# Patient Record
Sex: FEMALE | Race: WHITE | NOT HISPANIC OR LATINO | Employment: OTHER | URBAN - METROPOLITAN AREA
[De-identification: names, ages, dates, MRNs, and addresses within clinical notes are randomized per-mention and may not be internally consistent; named-entity substitution may affect disease eponyms.]

---

## 2017-09-05 DIAGNOSIS — Z12.31 ENCOUNTER FOR SCREENING MAMMOGRAM FOR MALIGNANT NEOPLASM OF BREAST: Primary | ICD-10-CM

## 2017-09-28 ENCOUNTER — HOSPITAL ENCOUNTER (OUTPATIENT)
Dept: RADIOLOGY | Facility: HOSPITAL | Age: 77
Discharge: HOME | End: 2017-09-28
Attending: INTERNAL MEDICINE
Payer: MEDICARE

## 2017-09-28 VITALS — WEIGHT: 142 LBS | BODY MASS INDEX: 22.82 KG/M2 | HEIGHT: 66 IN

## 2017-09-28 DIAGNOSIS — Z12.31 ENCOUNTER FOR SCREENING MAMMOGRAM FOR MALIGNANT NEOPLASM OF BREAST: ICD-10-CM

## 2017-09-28 PROCEDURE — 77067 SCR MAMMO BI INCL CAD: CPT | Mod: 26,,, | Performed by: RADIOLOGY

## 2017-09-28 PROCEDURE — 77063 BREAST TOMOSYNTHESIS BI: CPT | Mod: 26,,, | Performed by: RADIOLOGY

## 2017-09-28 PROCEDURE — 77067 SCR MAMMO BI INCL CAD: CPT | Mod: TC

## 2017-11-15 ENCOUNTER — OFFICE VISIT (OUTPATIENT)
Dept: DERMATOLOGY | Facility: CLINIC | Age: 77
End: 2017-11-15
Payer: MEDICARE

## 2017-11-15 VITALS — HEIGHT: 66 IN | BODY MASS INDEX: 22.82 KG/M2 | WEIGHT: 142 LBS

## 2017-11-15 DIAGNOSIS — D18.01 CHERRY ANGIOMA: ICD-10-CM

## 2017-11-15 DIAGNOSIS — L82.1 SEBORRHEIC KERATOSES: ICD-10-CM

## 2017-11-15 DIAGNOSIS — L81.4 SOLAR LENTIGO: ICD-10-CM

## 2017-11-15 DIAGNOSIS — L57.0 ACTINIC KERATOSES: Primary | ICD-10-CM

## 2017-11-15 PROCEDURE — 99999 PR PBB SHADOW E&M-EST. PATIENT-LVL III: CPT | Mod: PBBFAC,,, | Performed by: DERMATOLOGY

## 2017-11-15 PROCEDURE — 99213 OFFICE O/P EST LOW 20 MIN: CPT | Mod: 25,S$GLB,, | Performed by: DERMATOLOGY

## 2017-11-15 PROCEDURE — 17000 DESTRUCT PREMALG LESION: CPT | Mod: S$GLB,,, | Performed by: DERMATOLOGY

## 2017-11-15 NOTE — PATIENT INSTRUCTIONS

## 2017-11-15 NOTE — PROGRESS NOTES
Subjective:       Patient ID:  Divine Ray is a 76 y.o. female who presents for   Chief Complaint   Patient presents with    Spot     L arm     Last seen 9/2016  No h/o skin cancer  Denies h/o intense sun exposure      Spot  - Initial  Affected locations: left arm  Duration: 1 year  Signs and Symptoms: brown spots.  Severity: mild  Timing: constant  Aggravated by: nothing  Relieving factors/Treatments tried: nothing        Review of Systems   Constitutional: Negative for fever, chills, weight loss, weight gain and night sweats.   Skin: Positive for daily sunscreen use, activity-related sunscreen use and wears hat.   Hematologic/Lymphatic: Does not bruise/bleed easily.        Objective:    Physical Exam   Constitutional: She appears well-developed and well-nourished. No distress.   Neurological: She is alert and oriented to person, place, and time. She is not disoriented.   Psychiatric: She has a normal mood and affect.   Skin:   Areas Examined (abnormalities noted in diagram):   Scalp / Hair Palpated and Inspected  Head / Face Inspection Performed  Neck Inspection Performed  Chest / Axilla Inspection Performed  Abdomen Inspection Performed  Back Inspection Performed  RUE Inspected  LUE Inspection Performed                   Diagram Legend     Erythematous scaling macule/papule c/w actinic keratosis       Vascular papule c/w angioma      Pigmented verrucoid papule/plaque c/w seborrheic keratosis      Yellow umbilicated papule c/w sebaceous hyperplasia      Irregularly shaped tan macule c/w lentigo     1-2 mm smooth white papules consistent with Milia      Movable subcutaneous cyst with punctum c/w epidermal inclusion cyst      Subcutaneous movable cyst c/w pilar cyst      Firm pink to brown papule c/w dermatofibroma      Pedunculated fleshy papule(s) c/w skin tag(s)      Evenly pigmented macule c/w junctional nevus     Mildly variegated pigmented, slightly irregular-bordered macule c/w mildly atypical nevus       Flesh colored to evenly pigmented papule c/w intradermal nevus       Pink pearly papule/plaque c/w basal cell carcinoma      Erythematous hyperkeratotic cursted plaque c/w SCC      Surgical scar with no sign of skin cancer recurrence      Open and closed comedones      Inflammatory papules and pustules      Verrucoid papule consistent consistent with wart     Erythematous eczematous patches and plaques     Dystrophic onycholytic nail with subungual debris c/w onychomycosis     Umbilicated papule    Erythematous-base heme-crusted tan verrucoid plaque consistent with inflamed seborrheic keratosis     Erythematous Silvery Scaling Plaque c/w Psoriasis     See annotation      Assessment / Plan:        Actinic keratoses  Cryosurgery Procedure Note    Verbal consent from the patient is obtained and the patient is aware of the precancerous quality and need for treatment of these lesions. Liquid nitrogen cryosurgery is applied to the 1 actinic keratoses, as detailed in the physical exam, to produce a freeze injury. The patient is aware that blisters may form and is instructed on wound care with gentle cleansing and use of vaseline ointment to keep moist until healed. The patient is supplied a handout on cryosurgery and is instructed to call if lesions do not completely resolve.    Seborrheic keratoses  These are benign inherited growths without a malignant potential. Reassurance given to patient. No treatment is necessary.     Solar lentigo  This is a benign hyperpigmented sun induced lesion. Daily sun protection will reduce the number of new lesions. Treatment of these benign lesions are considered cosmetic.    Cherry angioma  This is a benign vascular lesion. Reassurance given. No treatment required.     Patient instructed in importance in daily sun protection of at least spf 30. Sun avoidance and topical protection discussed.   Recommend Elta MD (physician office) COTZ sensitive (available online) for daily use on face  and neck.  Patient encouraged to wear hat for all outdoor exposure.   Also discussed sun protective clothing.           Return in about 1 year (around 11/15/2018).

## 2018-08-30 DIAGNOSIS — Z12.39 SCREENING BREAST EXAMINATION: Primary | ICD-10-CM

## 2018-10-01 ENCOUNTER — HOSPITAL ENCOUNTER (OUTPATIENT)
Dept: RADIOLOGY | Facility: HOSPITAL | Age: 78
Discharge: HOME | End: 2018-10-01
Attending: INTERNAL MEDICINE
Payer: MEDICARE

## 2018-10-01 DIAGNOSIS — Z12.39 SCREENING BREAST EXAMINATION: ICD-10-CM

## 2018-10-01 PROCEDURE — 77067 SCR MAMMO BI INCL CAD: CPT | Mod: 26,,, | Performed by: RADIOLOGY

## 2018-10-01 PROCEDURE — 77063 BREAST TOMOSYNTHESIS BI: CPT | Mod: TC

## 2018-10-01 PROCEDURE — 77063 BREAST TOMOSYNTHESIS BI: CPT | Mod: 26,,, | Performed by: RADIOLOGY

## 2018-11-06 ENCOUNTER — OFFICE VISIT (OUTPATIENT)
Dept: DERMATOLOGY | Facility: CLINIC | Age: 78
End: 2018-11-06
Payer: MEDICARE

## 2018-11-06 DIAGNOSIS — L25.9 CONTACT DERMATITIS, UNSPECIFIED CONTACT DERMATITIS TYPE, UNSPECIFIED TRIGGER: Primary | ICD-10-CM

## 2018-11-06 DIAGNOSIS — L82.1 SEBORRHEIC KERATOSES: ICD-10-CM

## 2018-11-06 PROCEDURE — 99213 OFFICE O/P EST LOW 20 MIN: CPT | Mod: S$GLB,,, | Performed by: DERMATOLOGY

## 2018-11-06 PROCEDURE — 99999 PR PBB SHADOW E&M-EST. PATIENT-LVL III: CPT | Mod: PBBFAC,,, | Performed by: DERMATOLOGY

## 2018-11-06 PROCEDURE — 1101F PT FALLS ASSESS-DOCD LE1/YR: CPT | Mod: CPTII,S$GLB,, | Performed by: DERMATOLOGY

## 2018-11-06 RX ORDER — LOSARTAN POTASSIUM 100 MG/1
100 TABLET ORAL DAILY
Refills: 0 | COMMUNITY
Start: 2018-08-29 | End: 2021-04-05 | Stop reason: SDUPTHER

## 2018-11-06 RX ORDER — BETAMETHASONE DIPROPIONATE 0.5 MG/G
OINTMENT TOPICAL
Qty: 45 G | Refills: 0 | Status: SHIPPED | OUTPATIENT
Start: 2018-11-06 | End: 2020-05-04

## 2018-11-06 NOTE — PROGRESS NOTES
Subjective:       Patient ID:  Divine Ray is a 77 y.o. female who presents for   Chief Complaint   Patient presents with    Itching     neck    Lesion     neck     Patient last seen 11/2017  No h/o skin cancer  Denies h/o intense sun exposure    New complaint of itchy rash on B neck base x 2 weeks, no treatment tried  r side started and is now improved, left side new and angry  Occasional silver necklace no laniard  No change in PO meds  No change in skin care routine  Dove sensitive, no moisturizer  Spray perfume in area (ears), same x years  New landscaping to front yard, did not handle the plants directly      Current Outpatient Medications:     amlodipine (NORVASC) 5 MG tablet, TK 1 T PO  D, Disp: , Rfl: 1    losartan (COZAAR) 100 MG tablet, TK 1 T PO QD, Disp: , Rfl: 0        Itching  - Initial  Affected locations: neck  Duration: 2 weeks  Signs / symptoms: itching and redness  Severity: mild  Timing: constant  Aggravated by: nothing  Relieving factors/Treatments tried: nothing    Lesion  - Initial  Affected locations: neck  Duration: 2 weeks  Signs and Symptoms: raised.  Severity: mild  Timing: constant  Aggravated by: nothing  Relieving factors/Treatments tried: nothing        Review of Systems   Constitutional: Negative for fever, chills and fatigue.   Skin: Positive for itching, rash, activity-related sunscreen use and wears hat.   Hematologic/Lymphatic: Bruises/bleeds easily.        Objective:    Physical Exam   Skin:   Areas Examined (abnormalities noted in diagram):   Head / Face Inspection Performed  Neck Inspection Performed              Diagram Legend     Erythematous scaling macule/papule c/w actinic keratosis       Vascular papule c/w angioma      Pigmented verrucoid papule/plaque c/w seborrheic keratosis      Yellow umbilicated papule c/w sebaceous hyperplasia      Irregularly shaped tan macule c/w lentigo     1-2 mm smooth white papules consistent with Milia      Movable subcutaneous  cyst with punctum c/w epidermal inclusion cyst      Subcutaneous movable cyst c/w pilar cyst      Firm pink to brown papule c/w dermatofibroma      Pedunculated fleshy papule(s) c/w skin tag(s)      Evenly pigmented macule c/w junctional nevus     Mildly variegated pigmented, slightly irregular-bordered macule c/w mildly atypical nevus      Flesh colored to evenly pigmented papule c/w intradermal nevus       Pink pearly papule/plaque c/w basal cell carcinoma      Erythematous hyperkeratotic cursted plaque c/w SCC      Surgical scar with no sign of skin cancer recurrence      Open and closed comedones      Inflammatory papules and pustules      Verrucoid papule consistent consistent with wart     Erythematous eczematous patches and plaques     Dystrophic onycholytic nail with subungual debris c/w onychomycosis     Umbilicated papule    Erythematous-base heme-crusted tan verrucoid plaque consistent with inflamed seborrheic keratosis     Erythematous Silvery Scaling Plaque c/w Psoriasis     See annotation          Assessment / Plan:        Contact dermatitis, unspecified contact dermatitis type, unspecified trigger  -     betamethasone dipropionate (DIPROLENE) 0.05 % ointment; Thin film to AA B upper back/base of neck BID PRN flare  Dispense: 45 g; Refill: 0  vanicream moisturizer throughout day  Potent topical steroid BID, anticipate short term use  Investigate for contact triggers  Hold perfume and jewerly    Seborrheic keratoses  These are benign inherited growths without a malignant potential. Reassurance given to patient. No treatment is necessary.              Follow-up if symptoms worsen or fail to improve.

## 2018-11-06 NOTE — PATIENT INSTRUCTIONS
XEROSIS (DRY SKIN)    Xerosis is the term for dry skin.  We all have a natural oil coating over our skin produced by the skin oil glands.  If this oil is removed, the skin becomes dry which can lead to cracking, which can lead to inflammation. Xerosis is usually a long-term problem that recurs often, especially in the winter.    1. Cause     Long hot baths or showers can remove our natural oil and lead to xerosis.  One should never take more than one bath or shower a day and for no longer than ten minutes.   Use of harsh soaps such as Zest, Dial, and Ivory can worsen and cause xerosis.   Cold winter weather worsens xerosis because the amount of moisture contained in cold air is much less than the amount of moisture in warm air.    2. Treatment     Treatment is intended to restore the natural oil to your skin.  Keep the skin lubricated.     Do not take more than one bath or shower a day.  Use lukewarm water, not hot.  Hot water dries out the skin.     Use a gentle moisturizing soap such as Cetaphil soap, cerave gentle cleanser, Oil of Olay, Dove sensitive bar, Basis, Ivory moisture care, Restoraderm cleanser.     When toweling dry, dont rub.  Blot the skin so there is still some water left on the skin.  Immediately after toweling, you should apply a moisturizing cream from neck to toes such as Cerave cream, Cetaphil cream, Restoraderm or Eucerin Original Formula cream.  Alpha hydroxyacid lotions, i.e., AmLactin or Cerave SA, also work very well for preventing dry skin, but may burn when used on inflamed or reddened skin.     If you like to swim during the winter months, you should not use soap when getting out of the pool.  When you have finished swimming, rinse off the chlorine with cool to warm water.  If this will be the only shower of the day, then you may use Cetaphil or another mild soap to cleanse your skin.  After the shower, apply a moisturizing cream to all of the skin as above.    3. Additional  recommendation:      Use unscented hypoallergenic detergent for your clothes, avoid softener or dryer sheets unless they are unscented and hypoallergenic (all free and clear; downy free and gentle).    [facility]; [address] [place] [address] Tel: [phone] Fax: [phone]

## 2018-11-13 ENCOUNTER — TELEPHONE (OUTPATIENT)
Dept: DERMATOLOGY | Facility: CLINIC | Age: 78
End: 2018-11-13

## 2018-11-14 ENCOUNTER — TELEPHONE (OUTPATIENT)
Dept: DERMATOLOGY | Facility: CLINIC | Age: 78
End: 2018-11-14

## 2018-11-14 NOTE — TELEPHONE ENCOUNTER
----- Message from Avelina Treadwell sent at 11/14/2018  2:41 PM CST -----  Please call pt at [phone]  Returning call for Rosalinda / update on medication

## 2019-08-07 DIAGNOSIS — I35.1 AORTIC VALVE INSUFFICIENCY, ETIOLOGY OF CARDIAC VALVE DISEASE UNSPECIFIED: Primary | ICD-10-CM

## 2019-08-20 ENCOUNTER — HOSPITAL ENCOUNTER (OUTPATIENT)
Dept: CARDIOLOGY | Facility: HOSPITAL | Age: 79
Discharge: HOME | End: 2019-08-20
Attending: INTERNAL MEDICINE
Payer: MEDICARE

## 2019-08-20 VITALS
SYSTOLIC BLOOD PRESSURE: 122 MMHG | DIASTOLIC BLOOD PRESSURE: 78 MMHG | WEIGHT: 142 LBS | BODY MASS INDEX: 22.82 KG/M2 | HEIGHT: 66 IN

## 2019-08-20 DIAGNOSIS — I35.1 AORTIC VALVE INSUFFICIENCY, ETIOLOGY OF CARDIAC VALVE DISEASE UNSPECIFIED: ICD-10-CM

## 2019-08-20 PROCEDURE — 93306 TTE W/DOPPLER COMPLETE: CPT

## 2019-08-29 LAB
AORTIC ROOT ANNULUS: 2.19 CM
AORTIC VALVE CUSP SEPERATION: 1.01 CM
AV INDEX (PROSTH): 0.34
AV MEAN GRADIENT: 18 MMHG
AV PEAK GRADIENT: 33 MMHG
AV VALVE AREA: 1.11 CM2
AV VELOCITY RATIO: 0.32
BSA FOR ECHO PROCEDURE: 1.73 M2
CV ECHO LV RWT: 0.65 CM
DOP CALC AO PEAK VEL: 2.88 M/S
DOP CALC AO VTI: 66.01 CM
DOP CALC LVOT AREA: 3.2 CM2
DOP CALC LVOT DIAMETER: 2.03 CM
DOP CALC LVOT PEAK VEL: 0.93 M/S
DOP CALC LVOT STROKE VOLUME: 73.5 CM3
DOP CALCLVOT PEAK VEL VTI: 22.72 CM
E WAVE DECELERATION TIME: 210.52 MSEC
E/A RATIO: 0.71
E/E' RATIO: 12.46 M/S
ECHO LV POSTERIOR WALL: 1.08 CM (ref 0.6–1.1)
FRACTIONAL SHORTENING: 29 % (ref 28–44)
INTERVENTRICULAR SEPTUM: 1.1 CM (ref 0.6–1.1)
IVRT: 0.12 MSEC
LEFT ATRIUM SIZE: 3.26 CM
LEFT INTERNAL DIMENSION IN SYSTOLE: 2.37 CM (ref 2.1–4)
LEFT VENTRICLE DIASTOLIC VOLUME INDEX: 25.94 ML/M2
LEFT VENTRICLE DIASTOLIC VOLUME: 44.86 ML
LEFT VENTRICLE MASS INDEX: 63 G/M2
LEFT VENTRICLE SYSTOLIC VOLUME INDEX: 11.3 ML/M2
LEFT VENTRICLE SYSTOLIC VOLUME: 19.47 ML
LEFT VENTRICULAR INTERNAL DIMENSION IN DIASTOLE: 3.32 CM (ref 3.5–6)
LEFT VENTRICULAR MASS: 108.58 G
LV LATERAL E/E' RATIO: 11.57 M/S
LV SEPTAL E/E' RATIO: 13.5 M/S
MV A" WAVE DURATION": 161 MSEC
MV PEAK A VEL: 1.14 M/S
MV PEAK E VEL: 0.81 M/S
PISA TR MAX VEL: 2.22 M/S
PULM VEIN A" WAVE DURATION": 131 MSEC
PULM VEIN S/D RATIO: 1.25
PV PEAK D VEL: 0.52 M/S
PV PEAK S VEL: 0.65 M/S
PV PEAK VELOCITY: 0.92 CM/S
RIGHT VENTRICULAR END-DIASTOLIC DIMENSION: 2.84 CM
TDI LATERAL: 0.07 M/S
TDI SEPTAL: 0.06 M/S
TDI: 0.07 M/S
TR MAX PG: 20 MMHG
TRICUSPID ANNULAR PLANE SYSTOLIC EXCURSION: 2.31 CM

## 2019-09-11 DIAGNOSIS — Z12.31 ENCOUNTER FOR SCREENING MAMMOGRAM FOR MALIGNANT NEOPLASM OF BREAST: Primary | ICD-10-CM

## 2019-09-30 ENCOUNTER — HOSPITAL ENCOUNTER (OUTPATIENT)
Dept: RADIOLOGY | Facility: HOSPITAL | Age: 79
Discharge: HOME | End: 2019-09-30
Attending: INTERNAL MEDICINE
Payer: MEDICARE

## 2019-09-30 DIAGNOSIS — Z12.31 ENCOUNTER FOR SCREENING MAMMOGRAM FOR MALIGNANT NEOPLASM OF BREAST: ICD-10-CM

## 2019-09-30 PROCEDURE — 77063 BREAST TOMOSYNTHESIS BI: CPT | Mod: 26,,, | Performed by: RADIOLOGY

## 2019-09-30 PROCEDURE — 77063 MAMMO DIGITAL SCREENING BILAT WITH TOMOSYNTHESIS_CAD: ICD-10-PCS | Mod: 26,,, | Performed by: RADIOLOGY

## 2019-09-30 PROCEDURE — 77067 SCR MAMMO BI INCL CAD: CPT | Mod: TC

## 2019-09-30 PROCEDURE — 77067 SCR MAMMO BI INCL CAD: CPT | Mod: 26,,, | Performed by: RADIOLOGY

## 2019-09-30 PROCEDURE — 77067 MAMMO DIGITAL SCREENING BILAT WITH TOMOSYNTHESIS_CAD: ICD-10-PCS | Mod: 26,,, | Performed by: RADIOLOGY

## 2020-05-04 ENCOUNTER — OFFICE VISIT (OUTPATIENT)
Dept: ORTHOPEDICS | Facility: CLINIC | Age: 80
End: 2020-05-04
Payer: MEDICARE

## 2020-05-04 VITALS
HEART RATE: 70 BPM | BODY MASS INDEX: 22.82 KG/M2 | HEIGHT: 66 IN | SYSTOLIC BLOOD PRESSURE: 128 MMHG | DIASTOLIC BLOOD PRESSURE: 76 MMHG | WEIGHT: 142 LBS

## 2020-05-04 DIAGNOSIS — M23.203 DEGENERATIVE TEAR OF MEDIAL MENISCUS, RIGHT: Primary | ICD-10-CM

## 2020-05-04 DIAGNOSIS — M25.561 ACUTE PAIN OF RIGHT KNEE: ICD-10-CM

## 2020-05-04 PROCEDURE — 1159F PR MEDICATION LIST DOCUMENTED IN MEDICAL RECORD: ICD-10-PCS | Mod: S$GLB,,, | Performed by: ORTHOPAEDIC SURGERY

## 2020-05-04 PROCEDURE — 1159F MED LIST DOCD IN RCRD: CPT | Mod: S$GLB,,, | Performed by: ORTHOPAEDIC SURGERY

## 2020-05-04 PROCEDURE — 1101F PR PT FALLS ASSESS DOC 0-1 FALLS W/OUT INJ PAST YR: ICD-10-PCS | Mod: S$GLB,,, | Performed by: ORTHOPAEDIC SURGERY

## 2020-05-04 PROCEDURE — 1125F AMNT PAIN NOTED PAIN PRSNT: CPT | Mod: S$GLB,,, | Performed by: ORTHOPAEDIC SURGERY

## 2020-05-04 PROCEDURE — 20610 DRAIN/INJ JOINT/BURSA W/O US: CPT | Mod: RT,S$GLB,, | Performed by: ORTHOPAEDIC SURGERY

## 2020-05-04 PROCEDURE — 99203 OFFICE O/P NEW LOW 30 MIN: CPT | Mod: 25,S$GLB,, | Performed by: ORTHOPAEDIC SURGERY

## 2020-05-04 PROCEDURE — 20610 LARGE JOINT ASPIRATION/INJECTION: R KNEE: ICD-10-PCS | Mod: RT,S$GLB,, | Performed by: ORTHOPAEDIC SURGERY

## 2020-05-04 PROCEDURE — 1101F PT FALLS ASSESS-DOCD LE1/YR: CPT | Mod: S$GLB,,, | Performed by: ORTHOPAEDIC SURGERY

## 2020-05-04 PROCEDURE — 1125F PR PAIN SEVERITY QUANTIFIED, PAIN PRESENT: ICD-10-PCS | Mod: S$GLB,,, | Performed by: ORTHOPAEDIC SURGERY

## 2020-05-04 PROCEDURE — 99203 PR OFFICE/OUTPT VISIT, NEW, LEVL III, 30-44 MIN: ICD-10-PCS | Mod: 25,S$GLB,, | Performed by: ORTHOPAEDIC SURGERY

## 2020-05-04 RX ORDER — METHYLPREDNISOLONE ACETATE 40 MG/ML
40 INJECTION, SUSPENSION INTRA-ARTICULAR; INTRALESIONAL; INTRAMUSCULAR; SOFT TISSUE
Status: DISCONTINUED | OUTPATIENT
Start: 2020-05-04 | End: 2020-05-04 | Stop reason: HOSPADM

## 2020-05-04 RX ADMIN — METHYLPREDNISOLONE ACETATE 40 MG: 40 INJECTION, SUSPENSION INTRA-ARTICULAR; INTRALESIONAL; INTRAMUSCULAR; SOFT TISSUE at 08:05

## 2020-05-04 NOTE — PROGRESS NOTES
[facility]    Subjective:     Chief Complaint:   Chief Complaint   Patient presents with    Right Knee - Pain     Right knee pain x 2 weeks. States that she is not sure what she did. She has pain the worse when she moves a lot or stands a long time.        Past Medical History:   Diagnosis Date    Hypertension        Past Surgical History:   Procedure Laterality Date    HYSTERECTOMY      TONSILLECTOMY         Current Outpatient Medications   Medication Sig    amlodipine (NORVASC) 5 MG tablet TK 1 T PO  D    losartan (COZAAR) 100 MG tablet TK 1 T PO QD     No current facility-administered medications for this visit.        Review of patient's allergies indicates:  No Known Allergies    Family History   Problem Relation Age of Onset    Psoriasis Neg Hx     Lupus Neg Hx     Eczema Neg Hx     Melanoma Neg Hx        Social History     Socioeconomic History    Marital status:      Spouse name: Not on file    Number of children: Not on file    Years of education: Not on file    Highest education level: Not on file   Occupational History    Not on file   Social Needs    Financial resource strain: Not on file    Food insecurity:     Worry: Not on file     Inability: Not on file    Transportation needs:     Medical: Not on file     Non-medical: Not on file   Tobacco Use    Smoking status: Never Smoker    Smokeless tobacco: Never Used   Substance and Sexual Activity    Alcohol use: Not on file    Drug use: Not on file    Sexual activity: Not on file   Lifestyle    Physical activity:     Days per week: Not on file     Minutes per session: Not on file    Stress: Not on file   Relationships    Social connections:     Talks on phone: Not on file     Gets together: Not on file     Attends Jainism service: Not on file     Active member of club or organization: Not on file     Attends meetings of clubs or organizations: Not on file     Relationship status: Not on file   Other Topics  Concern    Are you pregnant or think you may be? Not Asked    Breast-feeding Not Asked   Social History Narrative    Not on file       History of present illness:  Lady has some right knee pain for matter of weeks.  No particular injury.  Has swelling mild limp no prior treatment      Review of Systems:    Constitution: Negative for chills, fever, and sweats.  Negative for unexplained weight loss.    HENT:  Negative for headaches and blurry vision.    Cardiovascular:Negative for chest pain or irregular heart beat. Negative for hypertension.    Respiratory:  Negative for cough and shortness of breath.    Gastrointestinal: Negative for abdominal pain, heartburn, melena, nausea, and vomitting.    Genitourinary:  Negative bladder incontinence and dysuria.    Musculoskeletal:  See HPI for details.     Neurological: Negative for numbness.    Psychiatric/Behavioral: Negative for depression.  The patient is not nervous/anxious.      Endocrine: Negative for polyuria    Hematologic/Lymphatic: Negative for bleeding problem.  Does not bruise/bleed easily.    Skin: Negative for poor would healing and rash    Objective:      Physical Examination:    Vital Signs:    Vitals:    05/04/20 0827   BP: 128/76   Pulse: 70       Body mass index is 22.92 kg/m².    This a well-developed, well nourished patient in no acute distress.  They are alert and oriented and cooperative to examination.        Physical exam reveals tenderness along medial joint line mild pain with medial Peewee pain with squat.  There is some quad atrophy there is a mild effusion right knee.  She does not have a gross limp  Pertinent New Results:    XRAY Report / Interpretation:   AP lateral sunrise right knee shows very little degenerative change medial compartment both knees symmetrical.  Neutral alignment.  No acute findings.  Signature    Assessment/Plan:      Impression is probably a degenerative medial meniscus tear right knee.  Our choices are steroid  injection for temporary relief or MRI.  Because the COVID-19 were going to a steroid injection today 1 cc Depo-Medrol 5 cc of lidocaine anterolateral.  If she has continued pain after few months then we will investigate with an MRI looking probably to arthroscope her knee work not an issue      This note was created using Dragon voice recognition software that occasionally misinterpreted phrases or words.

## 2020-06-04 NOTE — PROCEDURES
Large Joint Aspiration/Injection: R knee  Date/Time: 5/4/2020 8:15 AM  Performed by: Daryl Crews Jr., MD  Authorized by: Daryl Crews Jr., MD     Consent Done?:  Yes (Verbal)  Indications:  Pain  Site marked: the procedure site was marked    Timeout: prior to procedure the correct patient, procedure, and site was verified    Prep: patient was prepped and draped in usual sterile fashion      Local anesthesia used?: Yes    Local anesthetic:  Lidocaine 1% without epinephrine    Details:  Needle Size:  25 G  Ultrasonic Guidance for needle placement?: No    Location:  Knee  Site:  R knee  Medications:  40 mg methylPREDNISolone acetate 40 mg/mL  Patient tolerance:  Patient tolerated the procedure well with no immediate complications      
rolling walker

## 2020-07-29 ENCOUNTER — OFFICE VISIT (OUTPATIENT)
Dept: PRIMARY CARE CLINIC | Facility: CLINIC | Age: 80
End: 2020-07-29
Payer: MEDICARE

## 2020-07-29 VITALS
SYSTOLIC BLOOD PRESSURE: 136 MMHG | RESPIRATION RATE: 18 BRPM | TEMPERATURE: 98 F | OXYGEN SATURATION: 98 % | DIASTOLIC BLOOD PRESSURE: 70 MMHG | HEART RATE: 85 BPM

## 2020-07-29 DIAGNOSIS — R05.9 COUGH: ICD-10-CM

## 2020-07-29 PROCEDURE — 1101F PT FALLS ASSESS-DOCD LE1/YR: CPT | Mod: CPTII,S$GLB,, | Performed by: EMERGENCY MEDICINE

## 2020-07-29 PROCEDURE — 99203 PR OFFICE/OUTPT VISIT, NEW, LEVL III, 30-44 MIN: ICD-10-PCS | Mod: S$GLB,,, | Performed by: EMERGENCY MEDICINE

## 2020-07-29 PROCEDURE — 1159F PR MEDICATION LIST DOCUMENTED IN MEDICAL RECORD: ICD-10-PCS | Mod: S$GLB,,, | Performed by: EMERGENCY MEDICINE

## 2020-07-29 PROCEDURE — 1159F MED LIST DOCD IN RCRD: CPT | Mod: S$GLB,,, | Performed by: EMERGENCY MEDICINE

## 2020-07-29 PROCEDURE — 1101F PR PT FALLS ASSESS DOC 0-1 FALLS W/OUT INJ PAST YR: ICD-10-PCS | Mod: CPTII,S$GLB,, | Performed by: EMERGENCY MEDICINE

## 2020-07-29 PROCEDURE — 99203 OFFICE O/P NEW LOW 30 MIN: CPT | Mod: S$GLB,,, | Performed by: EMERGENCY MEDICINE

## 2020-07-29 PROCEDURE — U0003 INFECTIOUS AGENT DETECTION BY NUCLEIC ACID (DNA OR RNA); SEVERE ACUTE RESPIRATORY SYNDROME CORONAVIRUS 2 (SARS-COV-2) (CORONAVIRUS DISEASE [COVID-19]), AMPLIFIED PROBE TECHNIQUE, MAKING USE OF HIGH THROUGHPUT TECHNOLOGIES AS DESCRIBED BY CMS-2020-01-R: HCPCS

## 2020-07-29 NOTE — PROGRESS NOTES
"Subjective:        Time seen by provider: 11:08 AM on 07/29/2020    Divine Ray is a 79 y.o. female with HTN who presents for an evaluation of possible COVID-19. The patient complains of "mild" sore throat. Patient admits to exposure to COVID-19 through her  and states she just thought she should get tested. The patient denies fever, cough, SOB or any other symptoms at this time. PSHx of tonsillectomy.    Review of Systems   Constitutional: Negative for activity change, appetite change, fatigue and fever.   HENT: Positive for sore throat. Negative for congestion and rhinorrhea.    Respiratory: Negative for cough, chest tightness, shortness of breath and wheezing.    Cardiovascular: Negative for chest pain and palpitations.   Gastrointestinal: Negative for diarrhea, nausea and vomiting.   Musculoskeletal: Negative for arthralgias and myalgias.   Skin: Negative for rash.   Neurological: Negative for weakness, light-headedness, numbness and headaches.       Objective:      Physical Exam  Vitals signs and nursing note reviewed.   Constitutional:       General: She is not in acute distress.     Appearance: She is well-developed. She is not diaphoretic.   HENT:      Head: Normocephalic and atraumatic.      Nose: Nose normal.   Eyes:      Conjunctiva/sclera: Conjunctivae normal.   Neck:      Musculoskeletal: Normal range of motion.   Cardiovascular:      Rate and Rhythm: Normal rate and regular rhythm.      Heart sounds: Normal heart sounds. No murmur.   Pulmonary:      Effort: No respiratory distress.      Breath sounds: Normal breath sounds. No wheezing.   Musculoskeletal: Normal range of motion.   Skin:     General: Skin is warm and dry.   Neurological:      Mental Status: She is alert and oriented to person, place, and time.         Assessment:       1. Suspected COVID-19  COVID-19 Routine Screening   2. Viral pharyngitis  Plan:   1.Suspected COVID-19  COVID-19 Routine Screening  2. Viral pharyngitis  "   The patient's symptoms are consistent with viral pharyngitis.  I do not think the patient has strep pharyngitis based upon the [facility] Criteria.  The patient doesn't appear to have any stridor, drooling, hoarseness, uvular deviation, facial swelling, meningismus to suggest peritonsillar abscess, retropharyngeal abscess, epiglotitis, meningitis, airway compromise.  Will treat with supportive care.    - COVID-19 Routine Screening    2. Discharge home and await results.   3. Return to clinic or ED for new or worsening symptoms.   4. Follow-up with PCP as needed.     Scribe Attestation:   IAngélica, am scribing for, and in the presence of, ERNESTINE Sheppard. I performed the above scribed service and the documentation accurately describes the services I performed. I attest to the accuracy of the note.    I, ERNESTINE Sheppard, personally performed the services described in this documentation. All medical record entries made by the scribe were at my direction and in my presence.  I have reviewed the chart and agree that the record reflects my personal performance and is accurate and complete. ERNESTINE Sheppard.  11:24 AM 07/29/2020

## 2020-07-30 DIAGNOSIS — U07.1 COVID-19 VIRUS DETECTED: ICD-10-CM

## 2020-07-30 LAB — SARS-COV-2 RNA RESP QL NAA+PROBE: DETECTED

## 2020-10-01 ENCOUNTER — HOSPITAL ENCOUNTER (OUTPATIENT)
Dept: RADIOLOGY | Facility: HOSPITAL | Age: 80
Discharge: HOME | End: 2020-10-01
Attending: INTERNAL MEDICINE
Payer: MEDICARE

## 2020-10-01 DIAGNOSIS — Z12.31 VISIT FOR SCREENING MAMMOGRAM: ICD-10-CM

## 2020-10-01 PROCEDURE — 77063 MAMMO DIGITAL SCREENING BILAT WITH TOMO: ICD-10-PCS | Mod: 26,,, | Performed by: RADIOLOGY

## 2020-10-01 PROCEDURE — 77067 MAMMO DIGITAL SCREENING BILAT WITH TOMO: ICD-10-PCS | Mod: 26,,, | Performed by: RADIOLOGY

## 2020-10-01 PROCEDURE — 77067 SCR MAMMO BI INCL CAD: CPT | Mod: 26,,, | Performed by: RADIOLOGY

## 2020-10-01 PROCEDURE — 77063 BREAST TOMOSYNTHESIS BI: CPT | Mod: 26,,, | Performed by: RADIOLOGY

## 2020-10-01 PROCEDURE — 77067 SCR MAMMO BI INCL CAD: CPT | Mod: TC

## 2021-01-09 ENCOUNTER — IMMUNIZATION (OUTPATIENT)
Dept: PRIMARY CARE CLINIC | Facility: CLINIC | Age: 81
End: 2021-01-09
Payer: MEDICARE

## 2021-01-09 DIAGNOSIS — Z23 NEED FOR VACCINATION: ICD-10-CM

## 2021-01-09 PROCEDURE — 91300 COVID-19, MRNA, LNP-S, PF, 30 MCG/0.3 ML DOSE VACCINE: ICD-10-PCS | Mod: S$GLB,,, | Performed by: FAMILY MEDICINE

## 2021-01-09 PROCEDURE — 91300 COVID-19, MRNA, LNP-S, PF, 30 MCG/0.3 ML DOSE VACCINE: CPT | Mod: S$GLB,,, | Performed by: FAMILY MEDICINE

## 2021-01-09 PROCEDURE — 0001A COVID-19, MRNA, LNP-S, PF, 30 MCG/0.3 ML DOSE VACCINE: CPT | Mod: S$GLB,,, | Performed by: FAMILY MEDICINE

## 2021-01-09 PROCEDURE — 0001A COVID-19, MRNA, LNP-S, PF, 30 MCG/0.3 ML DOSE VACCINE: ICD-10-PCS | Mod: S$GLB,,, | Performed by: FAMILY MEDICINE

## 2021-01-30 ENCOUNTER — IMMUNIZATION (OUTPATIENT)
Dept: PRIMARY CARE CLINIC | Facility: CLINIC | Age: 81
End: 2021-01-30
Payer: MEDICARE

## 2021-01-30 DIAGNOSIS — Z23 NEED FOR VACCINATION: Primary | ICD-10-CM

## 2021-01-30 PROCEDURE — 91300 COVID-19, MRNA, LNP-S, PF, 30 MCG/0.3 ML DOSE VACCINE: ICD-10-PCS | Mod: S$GLB,,, | Performed by: FAMILY MEDICINE

## 2021-01-30 PROCEDURE — 91300 COVID-19, MRNA, LNP-S, PF, 30 MCG/0.3 ML DOSE VACCINE: CPT | Mod: S$GLB,,, | Performed by: FAMILY MEDICINE

## 2021-01-30 PROCEDURE — 0002A COVID-19, MRNA, LNP-S, PF, 30 MCG/0.3 ML DOSE VACCINE: CPT | Mod: S$GLB,,, | Performed by: FAMILY MEDICINE

## 2021-01-30 PROCEDURE — 0002A COVID-19, MRNA, LNP-S, PF, 30 MCG/0.3 ML DOSE VACCINE: ICD-10-PCS | Mod: S$GLB,,, | Performed by: FAMILY MEDICINE

## 2021-03-09 ENCOUNTER — TELEPHONE (OUTPATIENT)
Dept: FAMILY MEDICINE | Facility: CLINIC | Age: 81
End: 2021-03-09

## 2021-04-05 DIAGNOSIS — I10 ESSENTIAL HYPERTENSION: Primary | ICD-10-CM

## 2021-04-05 RX ORDER — AMLODIPINE BESYLATE 5 MG/1
TABLET ORAL
Qty: 30 TABLET | Refills: 0 | Status: SHIPPED | OUTPATIENT
Start: 2021-04-05 | End: 2021-04-21 | Stop reason: SDUPTHER

## 2021-04-05 RX ORDER — LOSARTAN POTASSIUM 100 MG/1
100 TABLET ORAL DAILY
Qty: 30 TABLET | Refills: 0 | Status: SHIPPED | OUTPATIENT
Start: 2021-04-05 | End: 2021-04-21 | Stop reason: SDUPTHER

## 2021-04-09 RX ORDER — CLOBETASOL PROPIONATE 0.5 MG/G
1 OINTMENT TOPICAL 2 TIMES DAILY
COMMUNITY
Start: 2021-03-26 | End: 2021-04-21 | Stop reason: SDUPTHER

## 2021-04-09 RX ORDER — AMLODIPINE BESYLATE 5 MG/1
5 TABLET ORAL DAILY
COMMUNITY
End: 2021-05-03

## 2021-04-09 RX ORDER — CLOBETASOL PROPIONATE 0.5 MG/G
OINTMENT TOPICAL 3 TIMES DAILY PRN
COMMUNITY
End: 2022-01-05 | Stop reason: SDUPTHER

## 2021-04-14 ENCOUNTER — TELEPHONE (OUTPATIENT)
Dept: DERMATOLOGY | Facility: CLINIC | Age: 81
End: 2021-04-14

## 2021-04-21 ENCOUNTER — TELEPHONE (OUTPATIENT)
Dept: FAMILY MEDICINE | Facility: CLINIC | Age: 81
End: 2021-04-21

## 2021-04-21 ENCOUNTER — OFFICE VISIT (OUTPATIENT)
Dept: FAMILY MEDICINE | Facility: CLINIC | Age: 81
End: 2021-04-21
Payer: MEDICARE

## 2021-04-21 VITALS
HEART RATE: 83 BPM | SYSTOLIC BLOOD PRESSURE: 136 MMHG | WEIGHT: 144.31 LBS | BODY MASS INDEX: 23.19 KG/M2 | TEMPERATURE: 98 F | HEIGHT: 66 IN | OXYGEN SATURATION: 98 % | DIASTOLIC BLOOD PRESSURE: 78 MMHG

## 2021-04-21 DIAGNOSIS — Z11.59 ENCOUNTER FOR HEPATITIS C SCREENING TEST FOR LOW RISK PATIENT: ICD-10-CM

## 2021-04-21 DIAGNOSIS — Z78.0 ASYMPTOMATIC POSTMENOPAUSAL STATE: ICD-10-CM

## 2021-04-21 DIAGNOSIS — I10 ESSENTIAL HYPERTENSION: ICD-10-CM

## 2021-04-21 DIAGNOSIS — Z23 NEED FOR PROPHYLACTIC VACCINATION AGAINST STREPTOCOCCUS PNEUMONIAE (PNEUMOCOCCUS): Primary | ICD-10-CM

## 2021-04-21 PROCEDURE — 99999 PR PBB SHADOW E&M-EST. PATIENT-LVL IV: ICD-10-PCS | Mod: PBBFAC,,, | Performed by: FAMILY MEDICINE

## 2021-04-21 PROCEDURE — 1159F PR MEDICATION LIST DOCUMENTED IN MEDICAL RECORD: ICD-10-PCS | Mod: S$GLB,,, | Performed by: FAMILY MEDICINE

## 2021-04-21 PROCEDURE — 3288F FALL RISK ASSESSMENT DOCD: CPT | Mod: CPTII,S$GLB,, | Performed by: FAMILY MEDICINE

## 2021-04-21 PROCEDURE — 99205 PR OFFICE/OUTPT VISIT, NEW, LEVL V, 60-74 MIN: ICD-10-PCS | Mod: S$GLB,,, | Performed by: FAMILY MEDICINE

## 2021-04-21 PROCEDURE — 3078F DIAST BP <80 MM HG: CPT | Mod: CPTII,S$GLB,, | Performed by: FAMILY MEDICINE

## 2021-04-21 PROCEDURE — 1159F MED LIST DOCD IN RCRD: CPT | Mod: S$GLB,,, | Performed by: FAMILY MEDICINE

## 2021-04-21 PROCEDURE — 1101F PR PT FALLS ASSESS DOC 0-1 FALLS W/OUT INJ PAST YR: ICD-10-PCS | Mod: CPTII,S$GLB,, | Performed by: FAMILY MEDICINE

## 2021-04-21 PROCEDURE — 3075F PR MOST RECENT SYSTOLIC BLOOD PRESS GE 130-139MM HG: ICD-10-PCS | Mod: CPTII,S$GLB,, | Performed by: FAMILY MEDICINE

## 2021-04-21 PROCEDURE — 1101F PT FALLS ASSESS-DOCD LE1/YR: CPT | Mod: CPTII,S$GLB,, | Performed by: FAMILY MEDICINE

## 2021-04-21 PROCEDURE — 3075F SYST BP GE 130 - 139MM HG: CPT | Mod: CPTII,S$GLB,, | Performed by: FAMILY MEDICINE

## 2021-04-21 PROCEDURE — 1126F AMNT PAIN NOTED NONE PRSNT: CPT | Mod: S$GLB,,, | Performed by: FAMILY MEDICINE

## 2021-04-21 PROCEDURE — 3288F PR FALLS RISK ASSESSMENT DOCUMENTED: ICD-10-PCS | Mod: CPTII,S$GLB,, | Performed by: FAMILY MEDICINE

## 2021-04-21 PROCEDURE — 1126F PR PAIN SEVERITY QUANTIFIED, NO PAIN PRESENT: ICD-10-PCS | Mod: S$GLB,,, | Performed by: FAMILY MEDICINE

## 2021-04-21 PROCEDURE — 3078F PR MOST RECENT DIASTOLIC BLOOD PRESSURE < 80 MM HG: ICD-10-PCS | Mod: CPTII,S$GLB,, | Performed by: FAMILY MEDICINE

## 2021-04-21 PROCEDURE — 99205 OFFICE O/P NEW HI 60 MIN: CPT | Mod: S$GLB,,, | Performed by: FAMILY MEDICINE

## 2021-04-21 PROCEDURE — 99999 PR PBB SHADOW E&M-EST. PATIENT-LVL IV: CPT | Mod: PBBFAC,,, | Performed by: FAMILY MEDICINE

## 2021-04-21 RX ORDER — LOSARTAN POTASSIUM 100 MG/1
100 TABLET ORAL DAILY
Qty: 90 TABLET | Refills: 3 | Status: SHIPPED | OUTPATIENT
Start: 2021-04-21 | End: 2022-04-25 | Stop reason: SDUPTHER

## 2021-04-23 ENCOUNTER — HOSPITAL ENCOUNTER (OUTPATIENT)
Dept: RADIOLOGY | Facility: HOSPITAL | Age: 81
Discharge: HOME | End: 2021-04-23
Attending: FAMILY MEDICINE
Payer: MEDICARE

## 2021-04-23 ENCOUNTER — PATIENT OUTREACH (OUTPATIENT)
Dept: ADMINISTRATIVE | Facility: HOSPITAL | Age: 81
End: 2021-04-23

## 2021-04-23 DIAGNOSIS — Z78.0 ASYMPTOMATIC POSTMENOPAUSAL STATE: ICD-10-CM

## 2021-04-23 PROCEDURE — 77080 DXA BONE DENSITY AXIAL: CPT | Mod: 26,,, | Performed by: RADIOLOGY

## 2021-04-23 PROCEDURE — 77080 DEXA BONE DENSITY SPINE HIP: ICD-10-PCS | Mod: 26,,, | Performed by: RADIOLOGY

## 2021-04-23 PROCEDURE — 77080 DXA BONE DENSITY AXIAL: CPT | Mod: TC

## 2021-04-27 ENCOUNTER — CLINICAL SUPPORT (OUTPATIENT)
Dept: FAMILY MEDICINE | Facility: CLINIC | Age: 81
End: 2021-04-27
Payer: MEDICARE

## 2021-04-27 DIAGNOSIS — Z23 IMMUNIZATION DUE: Primary | ICD-10-CM

## 2021-04-27 PROCEDURE — 90670 PNEUMOCOCCAL CONJUGATE VACCINE 13-VALENT LESS THAN 5YO & GREATER THAN: ICD-10-PCS | Mod: S$GLB,,, | Performed by: PHYSICIAN ASSISTANT

## 2021-04-27 PROCEDURE — G0009 PNEUMOCOCCAL CONJUGATE VACCINE 13-VALENT LESS THAN 5YO & GREATER THAN: ICD-10-PCS | Mod: S$GLB,,, | Performed by: PHYSICIAN ASSISTANT

## 2021-04-27 PROCEDURE — 90670 PCV13 VACCINE IM: CPT | Mod: S$GLB,,, | Performed by: PHYSICIAN ASSISTANT

## 2021-04-27 PROCEDURE — G0009 ADMIN PNEUMOCOCCAL VACCINE: HCPCS | Mod: S$GLB,,, | Performed by: PHYSICIAN ASSISTANT

## 2021-04-28 ENCOUNTER — TELEPHONE (OUTPATIENT)
Dept: FAMILY MEDICINE | Facility: CLINIC | Age: 81
End: 2021-04-28

## 2021-04-28 LAB
ALBUMIN SERPL-MCNC: 4 G/DL (ref 3.6–5.1)
ALBUMIN/GLOB SERPL: 1.4 (CALC) (ref 1–2.5)
ALP SERPL-CCNC: 73 U/L (ref 37–153)
ALT SERPL-CCNC: 15 U/L (ref 6–29)
AST SERPL-CCNC: 13 U/L (ref 10–35)
BASOPHILS # BLD AUTO: 69 CELLS/UL (ref 0–200)
BASOPHILS NFR BLD AUTO: 1.4 %
BILIRUB SERPL-MCNC: 0.7 MG/DL (ref 0.2–1.2)
BUN SERPL-MCNC: 16 MG/DL (ref 7–25)
BUN/CREAT SERPL: NORMAL (CALC) (ref 6–22)
CALCIUM SERPL-MCNC: 9.5 MG/DL (ref 8.6–10.4)
CHLORIDE SERPL-SCNC: 106 MMOL/L (ref 98–110)
CHOLEST SERPL-MCNC: 190 MG/DL
CHOLEST/HDLC SERPL: 2.6 (CALC)
CO2 SERPL-SCNC: 29 MMOL/L (ref 20–32)
CREAT SERPL-MCNC: 0.75 MG/DL (ref 0.6–0.93)
EOSINOPHIL # BLD AUTO: 152 CELLS/UL (ref 15–500)
EOSINOPHIL NFR BLD AUTO: 3.1 %
ERYTHROCYTE [DISTWIDTH] IN BLOOD BY AUTOMATED COUNT: 12.6 % (ref 11–15)
GLOBULIN SER CALC-MCNC: 2.8 G/DL (CALC) (ref 1.9–3.7)
GLUCOSE SERPL-MCNC: 93 MG/DL (ref 65–99)
HCT VFR BLD AUTO: 44.4 % (ref 35–45)
HCV AB S/CO SERPL IA: 0.01
HCV AB SERPL QL IA: NORMAL
HDLC SERPL-MCNC: 73 MG/DL
HGB BLD-MCNC: 14.8 G/DL (ref 11.7–15.5)
LDLC SERPL CALC-MCNC: 100 MG/DL (CALC)
LYMPHOCYTES # BLD AUTO: 1362 CELLS/UL (ref 850–3900)
LYMPHOCYTES NFR BLD AUTO: 27.8 %
MCH RBC QN AUTO: 30.1 PG (ref 27–33)
MCHC RBC AUTO-ENTMCNC: 33.3 G/DL (ref 32–36)
MCV RBC AUTO: 90.4 FL (ref 80–100)
MONOCYTES # BLD AUTO: 323 CELLS/UL (ref 200–950)
MONOCYTES NFR BLD AUTO: 6.6 %
NEUTROPHILS # BLD AUTO: 2994 CELLS/UL (ref 1500–7800)
NEUTROPHILS NFR BLD AUTO: 61.1 %
NONHDLC SERPL-MCNC: 117 MG/DL (CALC)
PLATELET # BLD AUTO: 202 THOUSAND/UL (ref 140–400)
PMV BLD REES-ECKER: 10.8 FL (ref 7.5–12.5)
POTASSIUM SERPL-SCNC: 3.9 MMOL/L (ref 3.5–5.3)
PROT SERPL-MCNC: 6.8 G/DL (ref 6.1–8.1)
RBC # BLD AUTO: 4.91 MILLION/UL (ref 3.8–5.1)
SODIUM SERPL-SCNC: 142 MMOL/L (ref 135–146)
TRIGL SERPL-MCNC: 77 MG/DL
WBC # BLD AUTO: 4.9 THOUSAND/UL (ref 3.8–10.8)

## 2021-05-03 ENCOUNTER — OFFICE VISIT (OUTPATIENT)
Dept: DERMATOLOGY | Facility: CLINIC | Age: 81
End: 2021-05-03
Payer: MEDICARE

## 2021-05-03 DIAGNOSIS — L81.4 SOLAR LENTIGO: ICD-10-CM

## 2021-05-03 DIAGNOSIS — L82.1 SEBORRHEIC KERATOSES: ICD-10-CM

## 2021-05-03 DIAGNOSIS — D48.5 NEOPLASM OF UNCERTAIN BEHAVIOR OF SKIN: Primary | ICD-10-CM

## 2021-05-03 DIAGNOSIS — D18.01 CHERRY ANGIOMA: ICD-10-CM

## 2021-05-03 PROCEDURE — 99213 OFFICE O/P EST LOW 20 MIN: CPT | Mod: 25,,, | Performed by: DERMATOLOGY

## 2021-05-03 PROCEDURE — 88305 TISSUE EXAM BY PATHOLOGIST: CPT | Performed by: PATHOLOGY

## 2021-05-03 PROCEDURE — 1101F PT FALLS ASSESS-DOCD LE1/YR: CPT | Mod: CPTII,,, | Performed by: DERMATOLOGY

## 2021-05-03 PROCEDURE — 99999 PR PBB SHADOW E&M-EST. PATIENT-LVL III: ICD-10-PCS | Mod: PBBFAC,,, | Performed by: DERMATOLOGY

## 2021-05-03 PROCEDURE — 3288F PR FALLS RISK ASSESSMENT DOCUMENTED: ICD-10-PCS | Mod: CPTII,,, | Performed by: DERMATOLOGY

## 2021-05-03 PROCEDURE — 11102 TANGNTL BX SKIN SINGLE LES: CPT | Mod: ,,, | Performed by: DERMATOLOGY

## 2021-05-03 PROCEDURE — 11102 PR TANGENTIAL BIOPSY, SKIN, SINGLE LESION: ICD-10-PCS | Mod: ,,, | Performed by: DERMATOLOGY

## 2021-05-03 PROCEDURE — 99999 PR PBB SHADOW E&M-EST. PATIENT-LVL III: CPT | Mod: PBBFAC,,, | Performed by: DERMATOLOGY

## 2021-05-03 PROCEDURE — 1101F PR PT FALLS ASSESS DOC 0-1 FALLS W/OUT INJ PAST YR: ICD-10-PCS | Mod: CPTII,,, | Performed by: DERMATOLOGY

## 2021-05-03 PROCEDURE — 88305 TISSUE EXAM BY PATHOLOGIST: CPT | Mod: 26,,, | Performed by: PATHOLOGY

## 2021-05-03 PROCEDURE — 88305 TISSUE EXAM BY PATHOLOGIST: ICD-10-PCS | Mod: 26,,, | Performed by: PATHOLOGY

## 2021-05-03 PROCEDURE — 3288F FALL RISK ASSESSMENT DOCD: CPT | Mod: CPTII,,, | Performed by: DERMATOLOGY

## 2021-05-03 PROCEDURE — 1159F MED LIST DOCD IN RCRD: CPT | Mod: ,,, | Performed by: DERMATOLOGY

## 2021-05-03 PROCEDURE — 1159F PR MEDICATION LIST DOCUMENTED IN MEDICAL RECORD: ICD-10-PCS | Mod: ,,, | Performed by: DERMATOLOGY

## 2021-05-03 PROCEDURE — 99213 PR OFFICE/OUTPT VISIT, EST, LEVL III, 20-29 MIN: ICD-10-PCS | Mod: 25,,, | Performed by: DERMATOLOGY

## 2021-05-04 LAB
FINAL PATHOLOGIC DIAGNOSIS: NORMAL
GROSS: NORMAL
Lab: NORMAL
MICROSCOPIC EXAM: NORMAL

## 2021-05-19 ENCOUNTER — PATIENT OUTREACH (OUTPATIENT)
Dept: ADMINISTRATIVE | Facility: HOSPITAL | Age: 81
End: 2021-05-19

## 2021-08-17 ENCOUNTER — TELEPHONE (OUTPATIENT)
Dept: FAMILY MEDICINE | Facility: CLINIC | Age: 81
End: 2021-08-17

## 2021-09-24 ENCOUNTER — TELEPHONE (OUTPATIENT)
Dept: FAMILY MEDICINE | Facility: CLINIC | Age: 81
End: 2021-09-24

## 2021-09-24 DIAGNOSIS — Z12.31 BREAST CANCER SCREENING BY MAMMOGRAM: Primary | ICD-10-CM

## 2021-09-27 ENCOUNTER — IMMUNIZATION (OUTPATIENT)
Dept: PRIMARY CARE CLINIC | Facility: CLINIC | Age: 81
End: 2021-09-27
Payer: MEDICARE

## 2021-09-27 DIAGNOSIS — Z23 NEED FOR VACCINATION: Primary | ICD-10-CM

## 2021-09-27 PROCEDURE — 91300 COVID-19, MRNA, LNP-S, PF, 30 MCG/0.3 ML DOSE VACCINE: CPT | Mod: S$GLB,,, | Performed by: FAMILY MEDICINE

## 2021-09-27 PROCEDURE — 0003A COVID-19, MRNA, LNP-S, PF, 30 MCG/0.3 ML DOSE VACCINE: CPT | Mod: S$GLB,,, | Performed by: FAMILY MEDICINE

## 2021-09-27 PROCEDURE — 0003A COVID-19, MRNA, LNP-S, PF, 30 MCG/0.3 ML DOSE VACCINE: ICD-10-PCS | Mod: S$GLB,,, | Performed by: FAMILY MEDICINE

## 2021-09-27 PROCEDURE — 91300 COVID-19, MRNA, LNP-S, PF, 30 MCG/0.3 ML DOSE VACCINE: ICD-10-PCS | Mod: S$GLB,,, | Performed by: FAMILY MEDICINE

## 2021-10-07 ENCOUNTER — HOSPITAL ENCOUNTER (OUTPATIENT)
Dept: RADIOLOGY | Facility: HOSPITAL | Age: 81
Discharge: HOME | End: 2021-10-07
Attending: FAMILY MEDICINE
Payer: MEDICARE

## 2021-10-07 DIAGNOSIS — Z12.31 BREAST CANCER SCREENING BY MAMMOGRAM: ICD-10-CM

## 2021-10-07 PROCEDURE — 77063 MAMMO DIGITAL SCREENING BILAT WITH TOMO: ICD-10-PCS | Mod: 26,,, | Performed by: RADIOLOGY

## 2021-10-07 PROCEDURE — 77067 SCR MAMMO BI INCL CAD: CPT | Mod: 26,,, | Performed by: RADIOLOGY

## 2021-10-07 PROCEDURE — 77067 MAMMO DIGITAL SCREENING BILAT WITH TOMO: ICD-10-PCS | Mod: 26,,, | Performed by: RADIOLOGY

## 2021-10-07 PROCEDURE — 77063 BREAST TOMOSYNTHESIS BI: CPT | Mod: 26,,, | Performed by: RADIOLOGY

## 2021-10-07 PROCEDURE — 77067 SCR MAMMO BI INCL CAD: CPT | Mod: TC

## 2021-10-19 ENCOUNTER — CLINICAL SUPPORT (OUTPATIENT)
Dept: FAMILY MEDICINE | Facility: CLINIC | Age: 81
End: 2021-10-19
Payer: MEDICARE

## 2021-10-19 DIAGNOSIS — Z23 IMMUNIZATION DUE: Primary | ICD-10-CM

## 2021-10-19 PROCEDURE — 90694 FLU VACCINE - QUADRIVALENT - ADJUVANTED: ICD-10-PCS | Mod: S$GLB,,, | Performed by: FAMILY MEDICINE

## 2021-10-19 PROCEDURE — G0008 ADMIN INFLUENZA VIRUS VAC: HCPCS | Mod: S$GLB,,, | Performed by: FAMILY MEDICINE

## 2021-10-19 PROCEDURE — G0008 FLU VACCINE - QUADRIVALENT - ADJUVANTED: ICD-10-PCS | Mod: S$GLB,,, | Performed by: FAMILY MEDICINE

## 2021-10-19 PROCEDURE — 90694 VACC AIIV4 NO PRSRV 0.5ML IM: CPT | Mod: S$GLB,,, | Performed by: FAMILY MEDICINE

## 2021-11-04 ENCOUNTER — TELEPHONE (OUTPATIENT)
Dept: FAMILY MEDICINE | Facility: CLINIC | Age: 81
End: 2021-11-04
Payer: MEDICARE

## 2021-11-04 DIAGNOSIS — Z13.220 ENCOUNTER FOR LIPID SCREENING FOR CARDIOVASCULAR DISEASE: ICD-10-CM

## 2021-11-04 DIAGNOSIS — I35.0 AORTIC VALVE STENOSIS, ETIOLOGY OF CARDIAC VALVE DISEASE UNSPECIFIED: ICD-10-CM

## 2021-11-04 DIAGNOSIS — Z00.00 ROUTINE GENERAL MEDICAL EXAMINATION AT A HEALTH CARE FACILITY: Primary | ICD-10-CM

## 2021-11-04 DIAGNOSIS — Z13.6 ENCOUNTER FOR LIPID SCREENING FOR CARDIOVASCULAR DISEASE: ICD-10-CM

## 2022-01-03 DIAGNOSIS — N89.8 VAGINAL ITCHING: Primary | ICD-10-CM

## 2022-01-03 NOTE — TELEPHONE ENCOUNTER
----- Message from Derrek Wolfe sent at 1/3/2022 11:28 AM CST -----  Contact: pt  Type:  RX Refill Request    Who Called:  pt  Refill or New Rx:  refill   RX Name and Strength:  clobetasol 0.05% (TEMOVATE) 0.05 % Oint  How is the patient currently taking it? (ex. 1XDay):  as needed   Is this a 30 day or 90 day RX:  90  Preferred Pharmacy with phone number:    [id number] - [place] - [address] AT [facility] [address]  [facility] [place] [address]  Phone: [phone] Fax: [phone]  Local or Mail Order:  local   Ordering Provider:  naye Parikh Call Back Number:  [phone]  Additional Information:  thanks please call back pt to advise if  can fill it

## 2022-01-05 RX ORDER — CLOBETASOL PROPIONATE 0.5 MG/G
OINTMENT TOPICAL 2 TIMES DAILY PRN
OUTPATIENT
Start: 2022-01-05

## 2022-01-05 RX ORDER — CLOBETASOL PROPIONATE 0.5 MG/G
OINTMENT TOPICAL DAILY PRN
Qty: 15 G | Refills: 1 | Status: SHIPPED | OUTPATIENT
Start: 2022-01-05 | End: 2022-10-17

## 2022-02-21 ENCOUNTER — PES CALL (OUTPATIENT)
Dept: ADMINISTRATIVE | Facility: CLINIC | Age: 82
End: 2022-02-21
Payer: MEDICARE

## 2022-03-22 ENCOUNTER — TELEPHONE (OUTPATIENT)
Dept: FAMILY MEDICINE | Facility: CLINIC | Age: 82
End: 2022-03-22
Payer: MEDICARE

## 2022-03-22 NOTE — TELEPHONE ENCOUNTER
I spoke with pt advised her that labs have been sent to electronically to [facility]. All understanding voiced.     ----- Message from Zenaida Guzman sent at 3/22/2022 12:41 PM CDT -----  Type: Needs Medical Advice  Who Called:  Pt  Best Call Back Number: [phone]  Additional Information: Pt requesting orders for appt before 04/25 to be sent to [facility]-please return call once sent--please advise--thank you

## 2022-04-15 LAB
ALBUMIN SERPL-MCNC: 4.3 G/DL (ref 3.6–5.1)
ALBUMIN/GLOB SERPL: 1.7 (CALC) (ref 1–2.5)
ALP SERPL-CCNC: 75 U/L (ref 37–153)
ALT SERPL-CCNC: 17 U/L (ref 6–29)
AST SERPL-CCNC: 13 U/L (ref 10–35)
BASOPHILS # BLD AUTO: 51 CELLS/UL (ref 0–200)
BASOPHILS NFR BLD AUTO: 1 %
BILIRUB SERPL-MCNC: 0.7 MG/DL (ref 0.2–1.2)
BUN SERPL-MCNC: 14 MG/DL (ref 7–25)
BUN/CREAT SERPL: NORMAL (CALC) (ref 6–22)
CALCIUM SERPL-MCNC: 9.9 MG/DL (ref 8.6–10.4)
CHLORIDE SERPL-SCNC: 107 MMOL/L (ref 98–110)
CHOLEST SERPL-MCNC: 211 MG/DL
CHOLEST/HDLC SERPL: 2.8 (CALC)
CO2 SERPL-SCNC: 26 MMOL/L (ref 20–32)
CREAT SERPL-MCNC: 0.79 MG/DL (ref 0.6–0.88)
EOSINOPHIL # BLD AUTO: 224 CELLS/UL (ref 15–500)
EOSINOPHIL NFR BLD AUTO: 4.4 %
ERYTHROCYTE [DISTWIDTH] IN BLOOD BY AUTOMATED COUNT: 12.7 % (ref 11–15)
GLOBULIN SER CALC-MCNC: 2.6 G/DL (CALC) (ref 1.9–3.7)
GLUCOSE SERPL-MCNC: 91 MG/DL (ref 65–99)
HCT VFR BLD AUTO: 42.3 % (ref 35–45)
HDLC SERPL-MCNC: 75 MG/DL
HGB BLD-MCNC: 14.2 G/DL (ref 11.7–15.5)
LDLC SERPL CALC-MCNC: 118 MG/DL (CALC)
LYMPHOCYTES # BLD AUTO: 1596 CELLS/UL (ref 850–3900)
LYMPHOCYTES NFR BLD AUTO: 31.3 %
MCH RBC QN AUTO: 29.5 PG (ref 27–33)
MCHC RBC AUTO-ENTMCNC: 33.6 G/DL (ref 32–36)
MCV RBC AUTO: 87.9 FL (ref 80–100)
MONOCYTES # BLD AUTO: 321 CELLS/UL (ref 200–950)
MONOCYTES NFR BLD AUTO: 6.3 %
NEUTROPHILS # BLD AUTO: 2907 CELLS/UL (ref 1500–7800)
NEUTROPHILS NFR BLD AUTO: 57 %
NONHDLC SERPL-MCNC: 136 MG/DL (CALC)
PLATELET # BLD AUTO: 201 THOUSAND/UL (ref 140–400)
PMV BLD REES-ECKER: 11 FL (ref 7.5–12.5)
POTASSIUM SERPL-SCNC: 3.9 MMOL/L (ref 3.5–5.3)
PROT SERPL-MCNC: 6.9 G/DL (ref 6.1–8.1)
RBC # BLD AUTO: 4.81 MILLION/UL (ref 3.8–5.1)
SODIUM SERPL-SCNC: 141 MMOL/L (ref 135–146)
TRIGL SERPL-MCNC: 84 MG/DL
WBC # BLD AUTO: 5.1 THOUSAND/UL (ref 3.8–10.8)

## 2022-04-25 ENCOUNTER — OFFICE VISIT (OUTPATIENT)
Dept: FAMILY MEDICINE | Facility: CLINIC | Age: 82
End: 2022-04-25
Payer: MEDICARE

## 2022-04-25 VITALS
RESPIRATION RATE: 18 BRPM | SYSTOLIC BLOOD PRESSURE: 124 MMHG | OXYGEN SATURATION: 98 % | BODY MASS INDEX: 22.85 KG/M2 | HEART RATE: 80 BPM | HEIGHT: 66 IN | WEIGHT: 142.19 LBS | DIASTOLIC BLOOD PRESSURE: 70 MMHG

## 2022-04-25 DIAGNOSIS — I10 ESSENTIAL HYPERTENSION: ICD-10-CM

## 2022-04-25 DIAGNOSIS — I35.0 MODERATE TO SEVERE AORTIC STENOSIS: ICD-10-CM

## 2022-04-25 DIAGNOSIS — Z00.00 ROUTINE GENERAL MEDICAL EXAMINATION AT A HEALTH CARE FACILITY: Primary | ICD-10-CM

## 2022-04-25 PROCEDURE — 1126F PR PAIN SEVERITY QUANTIFIED, NO PAIN PRESENT: ICD-10-PCS | Mod: CPTII,S$GLB,, | Performed by: STUDENT IN AN ORGANIZED HEALTH CARE EDUCATION/TRAINING PROGRAM

## 2022-04-25 PROCEDURE — 1159F PR MEDICATION LIST DOCUMENTED IN MEDICAL RECORD: ICD-10-PCS | Mod: CPTII,S$GLB,, | Performed by: STUDENT IN AN ORGANIZED HEALTH CARE EDUCATION/TRAINING PROGRAM

## 2022-04-25 PROCEDURE — 1159F MED LIST DOCD IN RCRD: CPT | Mod: CPTII,S$GLB,, | Performed by: STUDENT IN AN ORGANIZED HEALTH CARE EDUCATION/TRAINING PROGRAM

## 2022-04-25 PROCEDURE — 3288F PR FALLS RISK ASSESSMENT DOCUMENTED: ICD-10-PCS | Mod: CPTII,S$GLB,, | Performed by: STUDENT IN AN ORGANIZED HEALTH CARE EDUCATION/TRAINING PROGRAM

## 2022-04-25 PROCEDURE — 1160F RVW MEDS BY RX/DR IN RCRD: CPT | Mod: CPTII,S$GLB,, | Performed by: STUDENT IN AN ORGANIZED HEALTH CARE EDUCATION/TRAINING PROGRAM

## 2022-04-25 PROCEDURE — 1101F PT FALLS ASSESS-DOCD LE1/YR: CPT | Mod: CPTII,S$GLB,, | Performed by: STUDENT IN AN ORGANIZED HEALTH CARE EDUCATION/TRAINING PROGRAM

## 2022-04-25 PROCEDURE — 1126F AMNT PAIN NOTED NONE PRSNT: CPT | Mod: CPTII,S$GLB,, | Performed by: STUDENT IN AN ORGANIZED HEALTH CARE EDUCATION/TRAINING PROGRAM

## 2022-04-25 PROCEDURE — 3074F PR MOST RECENT SYSTOLIC BLOOD PRESSURE < 130 MM HG: ICD-10-PCS | Mod: CPTII,S$GLB,, | Performed by: STUDENT IN AN ORGANIZED HEALTH CARE EDUCATION/TRAINING PROGRAM

## 2022-04-25 PROCEDURE — 3074F SYST BP LT 130 MM HG: CPT | Mod: CPTII,S$GLB,, | Performed by: STUDENT IN AN ORGANIZED HEALTH CARE EDUCATION/TRAINING PROGRAM

## 2022-04-25 PROCEDURE — 1160F PR REVIEW ALL MEDS BY PRESCRIBER/CLIN PHARMACIST DOCUMENTED: ICD-10-PCS | Mod: CPTII,S$GLB,, | Performed by: STUDENT IN AN ORGANIZED HEALTH CARE EDUCATION/TRAINING PROGRAM

## 2022-04-25 PROCEDURE — 99214 PR OFFICE/OUTPT VISIT, EST, LEVL IV, 30-39 MIN: ICD-10-PCS | Mod: S$GLB,,, | Performed by: STUDENT IN AN ORGANIZED HEALTH CARE EDUCATION/TRAINING PROGRAM

## 2022-04-25 PROCEDURE — 3288F FALL RISK ASSESSMENT DOCD: CPT | Mod: CPTII,S$GLB,, | Performed by: STUDENT IN AN ORGANIZED HEALTH CARE EDUCATION/TRAINING PROGRAM

## 2022-04-25 PROCEDURE — 99999 PR PBB SHADOW E&M-EST. PATIENT-LVL III: ICD-10-PCS | Mod: PBBFAC,,, | Performed by: STUDENT IN AN ORGANIZED HEALTH CARE EDUCATION/TRAINING PROGRAM

## 2022-04-25 PROCEDURE — 99214 OFFICE O/P EST MOD 30 MIN: CPT | Mod: S$GLB,,, | Performed by: STUDENT IN AN ORGANIZED HEALTH CARE EDUCATION/TRAINING PROGRAM

## 2022-04-25 PROCEDURE — 3078F PR MOST RECENT DIASTOLIC BLOOD PRESSURE < 80 MM HG: ICD-10-PCS | Mod: CPTII,S$GLB,, | Performed by: STUDENT IN AN ORGANIZED HEALTH CARE EDUCATION/TRAINING PROGRAM

## 2022-04-25 PROCEDURE — 3078F DIAST BP <80 MM HG: CPT | Mod: CPTII,S$GLB,, | Performed by: STUDENT IN AN ORGANIZED HEALTH CARE EDUCATION/TRAINING PROGRAM

## 2022-04-25 PROCEDURE — 99999 PR PBB SHADOW E&M-EST. PATIENT-LVL III: CPT | Mod: PBBFAC,,, | Performed by: STUDENT IN AN ORGANIZED HEALTH CARE EDUCATION/TRAINING PROGRAM

## 2022-04-25 PROCEDURE — 1101F PR PT FALLS ASSESS DOC 0-1 FALLS W/OUT INJ PAST YR: ICD-10-PCS | Mod: CPTII,S$GLB,, | Performed by: STUDENT IN AN ORGANIZED HEALTH CARE EDUCATION/TRAINING PROGRAM

## 2022-04-25 RX ORDER — LOSARTAN POTASSIUM 100 MG/1
100 TABLET ORAL DAILY
Qty: 90 TABLET | Refills: 3 | Status: SHIPPED | OUTPATIENT
Start: 2022-04-25 | End: 2023-04-17

## 2022-04-25 RX ORDER — AMLODIPINE BESYLATE 5 MG/1
5 TABLET ORAL DAILY
Qty: 90 TABLET | Refills: 3 | Status: SHIPPED | OUTPATIENT
Start: 2022-04-25 | End: 2022-05-03

## 2022-04-25 RX ORDER — ALBUTEROL SULFATE 90 UG/1
AEROSOL, METERED RESPIRATORY (INHALATION)
COMMUNITY
End: 2022-04-25

## 2022-04-25 NOTE — PATIENT INSTRUCTIONS
Take fiber supplement daily. I recommend benefiber or generic (green top) equivalent. This is colorless, tasteless and dissolves well in liquid.     Drink plenty of water.         Dave Haskins,     If you are due for any health screening(s) below please notify me so we can arrange them to be ordered and scheduled to maintain your health. Most healthy patients complete it. Don't lose out on improving your health.     Health Maintenance   Topic Date Due    TETANUS VACCINE  Never done    DEXA Scan  04/23/2024    Lipid Panel  04/14/2027

## 2022-04-25 NOTE — PROGRESS NOTES
[facility] CLINIC NOTE    Patient Name: Divine Ray  YOB: 1940    PRESENTING HISTORY   Chief Complaint:   Chief Complaint   Patient presents with    Establish Care        History of Present Illness:  Ms. Divine Ray is a 81 y.o. female here to establish care.     HTN- on ARB, CCB with good control.     Used to see Dr. Rivera, doesn't want to go back or to see another cardiologist.     She has moderate to severe AS by last Echo that she brings me.     Sleeps on 1 pillow. No orthopnea or PND.     Yesterday she laid 8 bags of mulch by herself.   Is very active.       She was previously recommended for TAVR but she declined.                                                 Review of Systems   All other systems reviewed and are negative.        PAST HISTORY:     Past Medical History:   Diagnosis Date    Aortic valve stenosis 2019 TRISTIN- Dr Rivera    Asymptomatic - since age 21    Hypertension        Past Surgical History:   Procedure Laterality Date    BREAST BIOPSY      Benign, calcification    HYSTERECTOMY      TONSILLECTOMY         Family History   Problem Relation Age of Onset    Heart disease Mother     Hypertension Father     Psoriasis Neg Hx     Lupus Neg Hx     Eczema Neg Hx     Melanoma Neg Hx        Social History     Socioeconomic History    Marital status:    Tobacco Use    Smoking status: Never Smoker    Smokeless tobacco: Never Used   Substance and Sexual Activity    Alcohol use: Not Currently    Drug use: Never    Sexual activity: Not Currently       MEDICATIONS & ALLERGIES:     Current Outpatient Medications on File Prior to Visit   Medication Sig    calcium carbonate/vitamin D3 (CALTRATE-600 PLUS VITAMIN D3 ORAL) Take 1 tablet by mouth once daily.    clobetasol 0.05% (TEMOVATE) 0.05 % Oint Apply topically daily as needed.    [DISCONTINUED] albuterol (PROVENTIL/VENTOLIN HFA) 90 mcg/actuation inhaler albuterol sulfate HFA 90 mcg/actuation  "aerosol inhaler   INHALE 2 PUFFS BY MOUTH EVERY 4 HOURS FOR 7 DAYS AS NEEDED    [DISCONTINUED] amLODIPine (NORVASC) 5 MG tablet TAKE 1 TABLET BY MOUTH EVERY DAY    [DISCONTINUED] losartan (COZAAR) 100 MG tablet Take 1 tablet (100 mg total) by mouth once daily.     No current facility-administered medications on file prior to visit.       Review of patient's allergies indicates:  No Known Allergies    OBJECTIVE:   Vital Signs:  Vitals:    04/25/22 0920   BP: 124/70   Pulse: 80   Resp: 18   SpO2: 98%   Weight: 64.5 kg (142 lb 3.2 oz)   Height: 5' 6" (1.676 m)       No results found for this or any previous visit (from the past 24 hour(s)).      Physical Exam  Vitals and nursing note reviewed.   Constitutional:       Appearance: She is not diaphoretic.   HENT:      Head: Normocephalic and atraumatic.      Right Ear: External ear normal.      Left Ear: External ear normal.   Eyes:      General: No scleral icterus.     Conjunctiva/sclera: Conjunctivae normal.      Pupils: Pupils are equal, round, and reactive to light.   Neck:      Thyroid: No thyromegaly.      Vascular: No carotid bruit.   Cardiovascular:      Rate and Rhythm: Normal rate and regular rhythm.      Heart sounds: Murmur (5/6 HAYLEE with radiation to carotids bilaterally) heard.   Pulmonary:      Effort: Pulmonary effort is normal. No respiratory distress.      Breath sounds: Normal breath sounds. No wheezing or rales.   Musculoskeletal:         General: No tenderness or deformity. Normal range of motion.      Cervical back: Normal range of motion and neck supple.      Right lower leg: No edema.      Left lower leg: No edema.   Lymphadenopathy:      Cervical: No cervical adenopathy.   Skin:     General: Skin is warm and dry.      Findings: No erythema or rash.   Neurological:      Mental Status: She is alert and oriented to person, place, and time.      Gait: Gait is intact.   Psychiatric:         Mood and Affect: Mood and affect normal.         Cognition " and Memory: Memory normal.         Judgment: Judgment normal.         ASSESSMENT & PLAN:     Routine general medical examination at a health care facility    Essential hypertension  -     losartan (COZAAR) 100 MG tablet; Take 1 tablet (100 mg total) by mouth once daily.  Dispense: 90 tablet; Refill: 3  -     amLODIPine (NORVASC) 5 MG tablet; Take 1 tablet (5 mg total) by mouth once daily.  Dispense: 90 tablet; Refill: 3    Moderate to severe aortic stenosis  -     Echo; Future       81 F with HTN and moderate to severe AS without any evidence of heart failure. Ideally this would be monitored by a cardiologist. After some discussion she is willing to do annual Echo.     She will likely not consent to a procedure under any circumstances but may consider if she develops symptomatic disease.     She is very healthy and active for her age except for above.     ACP not performed today, but will need to do in future, particularly if we have known severe valvular disease as she may develop arrhythmia in future.       Jose Manuel Landrum MD   Internal Medicine    This note was created using Dragon voice recognition software that occasionally misinterprets phrases or words.

## 2022-05-11 ENCOUNTER — TELEPHONE (OUTPATIENT)
Dept: CARDIOLOGY | Facility: HOSPITAL | Age: 82
End: 2022-05-11

## 2022-05-12 ENCOUNTER — CLINICAL SUPPORT (OUTPATIENT)
Dept: CARDIOLOGY | Facility: HOSPITAL | Age: 82
End: 2022-05-12
Attending: STUDENT IN AN ORGANIZED HEALTH CARE EDUCATION/TRAINING PROGRAM
Payer: MEDICARE

## 2022-05-12 DIAGNOSIS — I35.0 MODERATE TO SEVERE AORTIC STENOSIS: ICD-10-CM

## 2022-05-12 LAB
AORTIC ROOT ANNULUS: 2.66 CM
AV INDEX (PROSTH): 0.35
AV MEAN GRADIENT: 31 MMHG
AV PEAK GRADIENT: 64 MMHG
AV VALVE AREA: 1.18 CM2
AV VELOCITY RATIO: 27.62
CV ECHO LV RWT: 0.63 CM
DOP CALC AO PEAK VEL: 4 M/S
DOP CALC AO VTI: 79.54 CM
DOP CALC LVOT AREA: 3.4 CM2
DOP CALC LVOT DIAMETER: 2.07 CM
DOP CALC LVOT PEAK VEL: 110.49 M/S
DOP CALC LVOT STROKE VOLUME: 93.48 CM3
DOP CALCLVOT PEAK VEL VTI: 27.79 CM
E WAVE DECELERATION TIME: 277.64 MSEC
E/A RATIO: 0.74
E/E' RATIO: 8.93 M/S
ECHO LV POSTERIOR WALL: 1.1 CM (ref 0.6–1.1)
EJECTION FRACTION: 65 %
FRACTIONAL SHORTENING: 36 % (ref 28–44)
INTERVENTRICULAR SEPTUM: 1.25 CM (ref 0.6–1.1)
IVC DIAMETER: 1.45 CM
LEFT ATRIUM SIZE: 3.23 CM
LEFT INTERNAL DIMENSION IN SYSTOLE: 2.22 CM (ref 2.1–4)
LEFT VENTRICLE DIASTOLIC VOLUME: 58.4 ML
LEFT VENTRICLE SYSTOLIC VOLUME: 20.5 ML
LEFT VENTRICULAR INTERNAL DIMENSION IN DIASTOLE: 3.47 CM (ref 3.5–6)
LEFT VENTRICULAR MASS: 129.86 G
LV LATERAL E/E' RATIO: 8.38 M/S
LV SEPTAL E/E' RATIO: 9.57 M/S
MV PEAK A VEL: 0.91 M/S
MV PEAK E VEL: 0.67 M/S
PISA TR MAX VEL: 2.35 M/S
RA PRESSURE: 3 MMHG
RIGHT VENTRICULAR END-DIASTOLIC DIMENSION: 253 CM
TDI LATERAL: 0.08 M/S
TDI SEPTAL: 0.07 M/S
TDI: 0.08 M/S
TR MAX PG: 22 MMHG
TV REST PULMONARY ARTERY PRESSURE: 25 MMHG

## 2022-05-12 PROCEDURE — 93306 TTE W/DOPPLER COMPLETE: CPT

## 2022-05-12 PROCEDURE — 93306 TTE W/DOPPLER COMPLETE: CPT | Mod: 26,,, | Performed by: INTERNAL MEDICINE

## 2022-05-12 PROCEDURE — 93306 ECHO (CUPID ONLY): ICD-10-PCS | Mod: 26,,, | Performed by: INTERNAL MEDICINE

## 2022-05-13 ENCOUNTER — TELEPHONE (OUTPATIENT)
Dept: FAMILY MEDICINE | Facility: CLINIC | Age: 82
End: 2022-05-13
Payer: MEDICARE

## 2022-05-13 NOTE — TELEPHONE ENCOUNTER
Call placed to patient for notification. Patient verbalized understanding. Appointment scheduled for the date of 5-23-22 with Dr. Landrum to discuss results. Patient agreed to appointment date, time, and location.

## 2022-05-13 NOTE — TELEPHONE ENCOUNTER
----- Message from Jose Manuel Landrum MD sent at 5/13/2022  6:48 AM CDT -----  The valve is severely blocked. I would recommend seeing a Cardiologist again. If she wants to talk about it have her come in.

## 2022-05-23 ENCOUNTER — OFFICE VISIT (OUTPATIENT)
Dept: FAMILY MEDICINE | Facility: CLINIC | Age: 82
End: 2022-05-23
Payer: MEDICARE

## 2022-05-23 VITALS
BODY MASS INDEX: 22.53 KG/M2 | DIASTOLIC BLOOD PRESSURE: 86 MMHG | HEIGHT: 66 IN | RESPIRATION RATE: 18 BRPM | OXYGEN SATURATION: 95 % | SYSTOLIC BLOOD PRESSURE: 134 MMHG | WEIGHT: 140.19 LBS | HEART RATE: 72 BPM

## 2022-05-23 DIAGNOSIS — I35.0 SEVERE AORTIC STENOSIS: Primary | ICD-10-CM

## 2022-05-23 PROCEDURE — 1126F AMNT PAIN NOTED NONE PRSNT: CPT | Mod: CPTII,S$GLB,, | Performed by: STUDENT IN AN ORGANIZED HEALTH CARE EDUCATION/TRAINING PROGRAM

## 2022-05-23 PROCEDURE — 3079F DIAST BP 80-89 MM HG: CPT | Mod: CPTII,S$GLB,, | Performed by: STUDENT IN AN ORGANIZED HEALTH CARE EDUCATION/TRAINING PROGRAM

## 2022-05-23 PROCEDURE — 3079F PR MOST RECENT DIASTOLIC BLOOD PRESSURE 80-89 MM HG: ICD-10-PCS | Mod: CPTII,S$GLB,, | Performed by: STUDENT IN AN ORGANIZED HEALTH CARE EDUCATION/TRAINING PROGRAM

## 2022-05-23 PROCEDURE — 1160F RVW MEDS BY RX/DR IN RCRD: CPT | Mod: CPTII,S$GLB,, | Performed by: STUDENT IN AN ORGANIZED HEALTH CARE EDUCATION/TRAINING PROGRAM

## 2022-05-23 PROCEDURE — 3075F PR MOST RECENT SYSTOLIC BLOOD PRESS GE 130-139MM HG: ICD-10-PCS | Mod: CPTII,S$GLB,, | Performed by: STUDENT IN AN ORGANIZED HEALTH CARE EDUCATION/TRAINING PROGRAM

## 2022-05-23 PROCEDURE — 1159F PR MEDICATION LIST DOCUMENTED IN MEDICAL RECORD: ICD-10-PCS | Mod: CPTII,S$GLB,, | Performed by: STUDENT IN AN ORGANIZED HEALTH CARE EDUCATION/TRAINING PROGRAM

## 2022-05-23 PROCEDURE — 99999 PR PBB SHADOW E&M-EST. PATIENT-LVL III: ICD-10-PCS | Mod: PBBFAC,,, | Performed by: STUDENT IN AN ORGANIZED HEALTH CARE EDUCATION/TRAINING PROGRAM

## 2022-05-23 PROCEDURE — 99999 PR PBB SHADOW E&M-EST. PATIENT-LVL III: CPT | Mod: PBBFAC,,, | Performed by: STUDENT IN AN ORGANIZED HEALTH CARE EDUCATION/TRAINING PROGRAM

## 2022-05-23 PROCEDURE — 3288F PR FALLS RISK ASSESSMENT DOCUMENTED: ICD-10-PCS | Mod: CPTII,S$GLB,, | Performed by: STUDENT IN AN ORGANIZED HEALTH CARE EDUCATION/TRAINING PROGRAM

## 2022-05-23 PROCEDURE — 1160F PR REVIEW ALL MEDS BY PRESCRIBER/CLIN PHARMACIST DOCUMENTED: ICD-10-PCS | Mod: CPTII,S$GLB,, | Performed by: STUDENT IN AN ORGANIZED HEALTH CARE EDUCATION/TRAINING PROGRAM

## 2022-05-23 PROCEDURE — 1101F PR PT FALLS ASSESS DOC 0-1 FALLS W/OUT INJ PAST YR: ICD-10-PCS | Mod: CPTII,S$GLB,, | Performed by: STUDENT IN AN ORGANIZED HEALTH CARE EDUCATION/TRAINING PROGRAM

## 2022-05-23 PROCEDURE — 1159F MED LIST DOCD IN RCRD: CPT | Mod: CPTII,S$GLB,, | Performed by: STUDENT IN AN ORGANIZED HEALTH CARE EDUCATION/TRAINING PROGRAM

## 2022-05-23 PROCEDURE — 1101F PT FALLS ASSESS-DOCD LE1/YR: CPT | Mod: CPTII,S$GLB,, | Performed by: STUDENT IN AN ORGANIZED HEALTH CARE EDUCATION/TRAINING PROGRAM

## 2022-05-23 PROCEDURE — 99213 OFFICE O/P EST LOW 20 MIN: CPT | Mod: S$GLB,,, | Performed by: STUDENT IN AN ORGANIZED HEALTH CARE EDUCATION/TRAINING PROGRAM

## 2022-05-23 PROCEDURE — 99213 PR OFFICE/OUTPT VISIT, EST, LEVL III, 20-29 MIN: ICD-10-PCS | Mod: S$GLB,,, | Performed by: STUDENT IN AN ORGANIZED HEALTH CARE EDUCATION/TRAINING PROGRAM

## 2022-05-23 PROCEDURE — 3288F FALL RISK ASSESSMENT DOCD: CPT | Mod: CPTII,S$GLB,, | Performed by: STUDENT IN AN ORGANIZED HEALTH CARE EDUCATION/TRAINING PROGRAM

## 2022-05-23 PROCEDURE — 1126F PR PAIN SEVERITY QUANTIFIED, NO PAIN PRESENT: ICD-10-PCS | Mod: CPTII,S$GLB,, | Performed by: STUDENT IN AN ORGANIZED HEALTH CARE EDUCATION/TRAINING PROGRAM

## 2022-05-23 PROCEDURE — 3075F SYST BP GE 130 - 139MM HG: CPT | Mod: CPTII,S$GLB,, | Performed by: STUDENT IN AN ORGANIZED HEALTH CARE EDUCATION/TRAINING PROGRAM

## 2022-05-24 NOTE — PROGRESS NOTES
[facility] MEDICINE CLINIC NOTE    Patient Name: iDvine Ray  YOB: 1940    PRESENTING HISTORY   Chief Complaint:   Chief Complaint   Patient presents with    Follow-up        History of Present Illness:  Ms. Divine Ray is a 81 y.o. female     She comes in at my request to review Echo results.   Severe AS. Mild regurg at mitral and tricuspid    She is very active. Recently laid 46 bags of mulch in her garden.   Sleeps on 1 pillow.   No swelling.                                             Review of Systems   All other systems reviewed and are negative.        PAST HISTORY:     Past Medical History:   Diagnosis Date    Aortic valve stenosis 2019 TRISTIN- Dr Rivera    Asymptomatic - since age 21    Hypertension        Past Surgical History:   Procedure Laterality Date    BREAST BIOPSY      Benign, calcification    HYSTERECTOMY      TONSILLECTOMY         Family History   Problem Relation Age of Onset    Heart disease Mother     Hypertension Father     Psoriasis Neg Hx     Lupus Neg Hx     Eczema Neg Hx     Melanoma Neg Hx        Social History     Socioeconomic History    Marital status:    Tobacco Use    Smoking status: Never Smoker    Smokeless tobacco: Never Used   Substance and Sexual Activity    Alcohol use: Not Currently    Drug use: Never    Sexual activity: Not Currently       MEDICATIONS & ALLERGIES:     Current Outpatient Medications on File Prior to Visit   Medication Sig    amLODIPine (NORVASC) 5 MG tablet TAKE 1 TABLET BY MOUTH EVERY DAY    calcium carbonate/vitamin D3 (CALTRATE-600 PLUS VITAMIN D3 ORAL) Take 1 tablet by mouth once daily.    clobetasol 0.05% (TEMOVATE) 0.05 % Oint Apply topically daily as needed.    losartan (COZAAR) 100 MG tablet Take 1 tablet (100 mg total) by mouth once daily.     No current facility-administered medications on file prior to visit.       Review of patient's allergies indicates:  No Known Allergies    OBJECTIVE:  "  Vital Signs:  Vitals:    05/23/22 1115   BP: 134/86   Pulse: 72   Resp: 18   SpO2: 95%   Weight: 63.6 kg (140 lb 3.4 oz)   Height: 5' 6" (1.676 m)       No results found for this or any previous visit (from the past 24 hour(s)).      Physical Exam  Vitals and nursing note reviewed.   Constitutional:       Appearance: She is not ill-appearing, toxic-appearing or diaphoretic.   Pulmonary:      Effort: Pulmonary effort is normal. No respiratory distress.         ASSESSMENT & PLAN:     Severe aortic stenosis  -     Echo; Future    Discussed pathology and potential treatment options. She does not want to see a cardiologist but is agreeable to annual surveillance of valve pathology.   Asymptomatic.     Jose Manuel Landrum MD   Internal Medicine    This note was created using Dragon voice recognition software that occasionally misinterprets phrases or words.          "

## 2022-07-25 ENCOUNTER — TELEPHONE (OUTPATIENT)
Dept: FAMILY MEDICINE | Facility: CLINIC | Age: 82
End: 2022-07-25
Payer: MEDICARE

## 2022-07-25 DIAGNOSIS — K64.9 HEMORRHOIDS, UNSPECIFIED HEMORRHOID TYPE: Primary | ICD-10-CM

## 2022-07-25 NOTE — TELEPHONE ENCOUNTER
I spoke with pt via phone. She states that she spoke with you at the previous office visit about her Hemorrhoids, and you advised her to increase her fiber intake. She states that it worked for awhile but now its bothering her again. Please advise, she thinks that they need to be removed.        ----- Message from Erum Whalen sent at 7/25/2022  2:24 PM CDT -----  Contact: Patient  Type: Patient Call Back         Who called: Patient         What is the request in detail: states hemorrhoids are hurting her; believes that need to be removed now; requesting call back to speak with nurse; please advise         Can the clinic reply by [facility]? call         Would the patient rather a call back or a response via [facility]? call         Best call back number: [phone]         Additional Information:            Thank You

## 2022-08-02 ENCOUNTER — OFFICE VISIT (OUTPATIENT)
Dept: SURGERY | Facility: CLINIC | Age: 82
End: 2022-08-02
Payer: MEDICARE

## 2022-08-02 VITALS
SYSTOLIC BLOOD PRESSURE: 125 MMHG | RESPIRATION RATE: 16 BRPM | TEMPERATURE: 98 F | HEART RATE: 90 BPM | WEIGHT: 140.19 LBS | DIASTOLIC BLOOD PRESSURE: 80 MMHG | HEIGHT: 66 IN | BODY MASS INDEX: 22.53 KG/M2

## 2022-08-02 DIAGNOSIS — K64.9 HEMORRHOIDS, UNSPECIFIED HEMORRHOID TYPE: ICD-10-CM

## 2022-08-02 DIAGNOSIS — K62.89 ANAL IRRITATION: Primary | ICD-10-CM

## 2022-08-02 PROCEDURE — 3288F PR FALLS RISK ASSESSMENT DOCUMENTED: ICD-10-PCS | Mod: CPTII,S$GLB,, | Performed by: PHYSICIAN ASSISTANT

## 2022-08-02 PROCEDURE — 3074F SYST BP LT 130 MM HG: CPT | Mod: CPTII,S$GLB,, | Performed by: PHYSICIAN ASSISTANT

## 2022-08-02 PROCEDURE — 3074F PR MOST RECENT SYSTOLIC BLOOD PRESSURE < 130 MM HG: ICD-10-PCS | Mod: CPTII,S$GLB,, | Performed by: PHYSICIAN ASSISTANT

## 2022-08-02 PROCEDURE — 3288F FALL RISK ASSESSMENT DOCD: CPT | Mod: CPTII,S$GLB,, | Performed by: PHYSICIAN ASSISTANT

## 2022-08-02 PROCEDURE — 1101F PR PT FALLS ASSESS DOC 0-1 FALLS W/OUT INJ PAST YR: ICD-10-PCS | Mod: CPTII,S$GLB,, | Performed by: PHYSICIAN ASSISTANT

## 2022-08-02 PROCEDURE — 1160F RVW MEDS BY RX/DR IN RCRD: CPT | Mod: CPTII,S$GLB,, | Performed by: PHYSICIAN ASSISTANT

## 2022-08-02 PROCEDURE — 3079F PR MOST RECENT DIASTOLIC BLOOD PRESSURE 80-89 MM HG: ICD-10-PCS | Mod: CPTII,S$GLB,, | Performed by: PHYSICIAN ASSISTANT

## 2022-08-02 PROCEDURE — 1160F PR REVIEW ALL MEDS BY PRESCRIBER/CLIN PHARMACIST DOCUMENTED: ICD-10-PCS | Mod: CPTII,S$GLB,, | Performed by: PHYSICIAN ASSISTANT

## 2022-08-02 PROCEDURE — 1159F MED LIST DOCD IN RCRD: CPT | Mod: CPTII,S$GLB,, | Performed by: PHYSICIAN ASSISTANT

## 2022-08-02 PROCEDURE — 3079F DIAST BP 80-89 MM HG: CPT | Mod: CPTII,S$GLB,, | Performed by: PHYSICIAN ASSISTANT

## 2022-08-02 PROCEDURE — 1101F PT FALLS ASSESS-DOCD LE1/YR: CPT | Mod: CPTII,S$GLB,, | Performed by: PHYSICIAN ASSISTANT

## 2022-08-02 PROCEDURE — 99203 PR OFFICE/OUTPT VISIT, NEW, LEVL III, 30-44 MIN: ICD-10-PCS | Mod: S$GLB,,, | Performed by: PHYSICIAN ASSISTANT

## 2022-08-02 PROCEDURE — 1159F PR MEDICATION LIST DOCUMENTED IN MEDICAL RECORD: ICD-10-PCS | Mod: CPTII,S$GLB,, | Performed by: PHYSICIAN ASSISTANT

## 2022-08-02 PROCEDURE — 99203 OFFICE O/P NEW LOW 30 MIN: CPT | Mod: S$GLB,,, | Performed by: PHYSICIAN ASSISTANT

## 2022-08-02 RX ORDER — DOXYLAMINE SUCCINATE 25 MG
TABLET ORAL 2 TIMES DAILY
Qty: 28 G | Refills: 0 | Status: SHIPPED | OUTPATIENT
Start: 2022-08-02 | End: 2023-08-30

## 2022-08-02 NOTE — PATIENT INSTRUCTIONS
I recommend using a barrier cream such as John Butt Paste or Desitin. Apply this at least once per day or more to clean, dry skin. You can apply more often if needed but be sure that the skin is clean and dry.     Apply the miconazole cream to the same area.     Try this for at least a couple of weeks. If you  notice some improvement, keep doing this. If no improvement, let me know and i'll set you up to see Dr Cordova, a colorectal surgeon.

## 2022-08-04 NOTE — PROGRESS NOTES
History & Physical    SUBJECTIVE:     History of Present Illness:  Patient is a 81 y.o. female presents with rectal pain. Onset of symptoms was gradual starting several weeks ago with gradually worsening course since that time. Patient denies blood per rectum, constipation, diarrhea, etc. Symptoms are aggravated by having a bowel movement. Symptoms improve with nothing in particular. She has incontinence of urine and wears a pad.      No chief complaint on file.      Review of patient's allergies indicates:  No Known Allergies    Current Outpatient Medications   Medication Sig Dispense Refill    amLODIPine (NORVASC) 5 MG tablet TAKE 1 TABLET BY MOUTH EVERY DAY 90 tablet 3    calcium carbonate/vitamin D3 (CALTRATE-600 PLUS VITAMIN D3 ORAL) Take 1 tablet by mouth once daily.      clobetasol 0.05% (TEMOVATE) 0.05 % Oint Apply topically daily as needed. 15 g 1    losartan (COZAAR) 100 MG tablet Take 1 tablet (100 mg total) by mouth once daily. 90 tablet 3    miconazole (MICOTIN) 2 % cream Apply topically 2 (two) times daily. 28 g 0     No current facility-administered medications for this visit.       Past Medical History:   Diagnosis Date    Aortic valve stenosis 2019 TRISTIN- Dr Rivera    Asymptomatic - since age 21    Hypertension      Past Surgical History:   Procedure Laterality Date    BREAST BIOPSY      Benign, calcification    HYSTERECTOMY      TONSILLECTOMY       Family History   Problem Relation Age of Onset    Heart disease Mother     Hypertension Father     Psoriasis Neg Hx     Lupus Neg Hx     Eczema Neg Hx     Melanoma Neg Hx      Social History     Tobacco Use    Smoking status: Never Smoker    Smokeless tobacco: Never Used   Substance Use Topics    Alcohol use: Not Currently    Drug use: Never        Review of Systems:  Review of Systems   Constitutional: Negative for activity change, appetite change, chills, fever and unexpected weight change.   Eyes: Negative for visual disturbance.  "  Gastrointestinal: Positive for rectal pain. Negative for abdominal distention, abdominal pain, anal bleeding, blood in stool, constipation, diarrhea, nausea and vomiting.   Genitourinary: Negative for difficulty urinating.   Musculoskeletal: Negative for arthralgias.   Skin: Negative for wound.   Hematological: Does not bruise/bleed easily.       OBJECTIVE:     Vital Signs (Most Recent)  Temp: 97.7 °F (36.5 °C) (08/02/22 1105)  Pulse: 90 (08/02/22 1105)  Resp: 16 (08/02/22 1105)  BP: 125/80 (08/02/22 1105)  5' 6" (1.676 m)  63.6 kg (140 lb 3.4 oz)     Physical Exam:  Physical Exam  Constitutional:       General: She is not in acute distress.  Eyes:      Extraocular Movements: Extraocular movements intact.   Cardiovascular:      Rate and Rhythm: Normal rate.   Pulmonary:      Effort: Pulmonary effort is normal. No respiratory distress.   Abdominal:      Tenderness: There is no abdominal tenderness.   Genitourinary:     Comments: Rectal area examined. She has small, non-inflamed external hemorrhoids and skin tag. There are no prolapsing internal hemorrhoids. No bleeding. The skin and mucosa surrounding the anal area is extremely excoriated and friable likely 2/2 excess moisture and irritant contact dermatitis.   Musculoskeletal:      Cervical back: Normal range of motion.   Skin:     General: Skin is warm and dry.   Neurological:      Mental Status: She is alert and oriented to person, place, and time.           ASSESSMENT/PLAN:     Anal irritation, irritant contact derm likely.   Recommend barrier cream applied to clean, dry skin.     rx antifungal as well     Avoid excessive wiping. Void using wipes.    F/u in 2-4 weeks or sooner for worsening.     "

## 2022-08-16 ENCOUNTER — OFFICE VISIT (OUTPATIENT)
Dept: SURGERY | Facility: CLINIC | Age: 82
End: 2022-08-16
Payer: MEDICARE

## 2022-08-16 VITALS
SYSTOLIC BLOOD PRESSURE: 146 MMHG | HEIGHT: 66 IN | RESPIRATION RATE: 16 BRPM | WEIGHT: 140.19 LBS | TEMPERATURE: 98 F | DIASTOLIC BLOOD PRESSURE: 82 MMHG | HEART RATE: 75 BPM | BODY MASS INDEX: 22.53 KG/M2

## 2022-08-16 DIAGNOSIS — K62.89 ANAL IRRITATION: Primary | ICD-10-CM

## 2022-08-16 PROCEDURE — 1159F PR MEDICATION LIST DOCUMENTED IN MEDICAL RECORD: ICD-10-PCS | Mod: CPTII,S$GLB,, | Performed by: PHYSICIAN ASSISTANT

## 2022-08-16 PROCEDURE — 1101F PR PT FALLS ASSESS DOC 0-1 FALLS W/OUT INJ PAST YR: ICD-10-PCS | Mod: CPTII,S$GLB,, | Performed by: PHYSICIAN ASSISTANT

## 2022-08-16 PROCEDURE — 1159F MED LIST DOCD IN RCRD: CPT | Mod: CPTII,S$GLB,, | Performed by: PHYSICIAN ASSISTANT

## 2022-08-16 PROCEDURE — 3077F SYST BP >= 140 MM HG: CPT | Mod: CPTII,S$GLB,, | Performed by: PHYSICIAN ASSISTANT

## 2022-08-16 PROCEDURE — 1101F PT FALLS ASSESS-DOCD LE1/YR: CPT | Mod: CPTII,S$GLB,, | Performed by: PHYSICIAN ASSISTANT

## 2022-08-16 PROCEDURE — 1126F AMNT PAIN NOTED NONE PRSNT: CPT | Mod: CPTII,S$GLB,, | Performed by: PHYSICIAN ASSISTANT

## 2022-08-16 PROCEDURE — 3077F PR MOST RECENT SYSTOLIC BLOOD PRESSURE >= 140 MM HG: ICD-10-PCS | Mod: CPTII,S$GLB,, | Performed by: PHYSICIAN ASSISTANT

## 2022-08-16 PROCEDURE — 3079F PR MOST RECENT DIASTOLIC BLOOD PRESSURE 80-89 MM HG: ICD-10-PCS | Mod: CPTII,S$GLB,, | Performed by: PHYSICIAN ASSISTANT

## 2022-08-16 PROCEDURE — 3288F PR FALLS RISK ASSESSMENT DOCUMENTED: ICD-10-PCS | Mod: CPTII,S$GLB,, | Performed by: PHYSICIAN ASSISTANT

## 2022-08-16 PROCEDURE — 3079F DIAST BP 80-89 MM HG: CPT | Mod: CPTII,S$GLB,, | Performed by: PHYSICIAN ASSISTANT

## 2022-08-16 PROCEDURE — 99213 OFFICE O/P EST LOW 20 MIN: CPT | Mod: S$GLB,,, | Performed by: PHYSICIAN ASSISTANT

## 2022-08-16 PROCEDURE — 1126F PR PAIN SEVERITY QUANTIFIED, NO PAIN PRESENT: ICD-10-PCS | Mod: CPTII,S$GLB,, | Performed by: PHYSICIAN ASSISTANT

## 2022-08-16 PROCEDURE — 3288F FALL RISK ASSESSMENT DOCD: CPT | Mod: CPTII,S$GLB,, | Performed by: PHYSICIAN ASSISTANT

## 2022-08-16 PROCEDURE — 99213 PR OFFICE/OUTPT VISIT, EST, LEVL III, 20-29 MIN: ICD-10-PCS | Mod: S$GLB,,, | Performed by: PHYSICIAN ASSISTANT

## 2022-08-16 NOTE — PROGRESS NOTES
History & Physical    SUBJECTIVE:     History of Present Illness:  Patient is a 81 y.o. female presents for follow up today. At last visit she c/o rectal pain. Onset of symptoms was gradual starting several weeks ago with gradually worsening course since that time. Patient denies blood per rectum, constipation, diarrhea, etc. Symptoms are aggravated by having a bowel movement. Symptoms improve with nothing in particular. She has incontinence of urine and wears a pad.  Today she brought the cream she is using for vulvar pruritis - clobestol.     No chief complaint on file.      Review of patient's allergies indicates:   Allergen Reactions    Anesthesia s/i-40 (propofol) [propofol] Nausea And Vomiting       Current Outpatient Medications   Medication Sig Dispense Refill    amLODIPine (NORVASC) 5 MG tablet TAKE 1 TABLET BY MOUTH EVERY DAY 90 tablet 3    calcium carbonate/vitamin D3 (CALTRATE-600 PLUS VITAMIN D3 ORAL) Take 1 tablet by mouth once daily.      clobetasol 0.05% (TEMOVATE) 0.05 % Oint Apply topically daily as needed. 15 g 1    losartan (COZAAR) 100 MG tablet Take 1 tablet (100 mg total) by mouth once daily. 90 tablet 3    miconazole (MICOTIN) 2 % cream Apply topically 2 (two) times daily. 28 g 0     No current facility-administered medications for this visit.       Past Medical History:   Diagnosis Date    Aortic valve stenosis 2019 TRISTIN- Dr Rivera    Asymptomatic - since age 21    Hypertension      Past Surgical History:   Procedure Laterality Date    BREAST BIOPSY      Benign, calcification    HYSTERECTOMY      TONSILLECTOMY       Family History   Problem Relation Age of Onset    Heart disease Mother     Hypertension Father     Psoriasis Neg Hx     Lupus Neg Hx     Eczema Neg Hx     Melanoma Neg Hx      Social History     Tobacco Use    Smoking status: Never Smoker    Smokeless tobacco: Never Used   Substance Use Topics    Alcohol use: Not Currently    Drug use: Never        Review of  "Systems:  Review of Systems   Constitutional: Negative for activity change, appetite change, chills, fever and unexpected weight change.   Eyes: Negative for visual disturbance.   Gastrointestinal: Positive for rectal pain. Negative for abdominal distention, abdominal pain, anal bleeding, blood in stool, constipation, diarrhea, nausea and vomiting.   Genitourinary: Negative for difficulty urinating.   Musculoskeletal: Negative for arthralgias.   Skin: Negative for wound.   Hematological: Does not bruise/bleed easily.       OBJECTIVE:     Vital Signs (Most Recent)  Temp: 97.6 °F (36.4 °C) (08/16/22 1320)  Pulse: 75 (08/16/22 1320)  Resp: 16 (08/16/22 1320)  BP: (!) 146/82 (08/16/22 1320)  5' 6" (1.676 m)  63.6 kg (140 lb 3.4 oz)     Physical Exam:  Physical Exam  Constitutional:       General: She is not in acute distress.  Eyes:      Extraocular Movements: Extraocular movements intact.   Cardiovascular:      Rate and Rhythm: Normal rate.   Pulmonary:      Effort: Pulmonary effort is normal. No respiratory distress.   Abdominal:      Tenderness: There is no abdominal tenderness.   Genitourinary:     Comments: Rectal area examined. She has small, non-inflamed external hemorrhoids and skin tag. There are no prolapsing internal hemorrhoids. No bleeding. The skin and mucosa surrounding the anal area is much less excoriated and inflamed. No excess moisture. Skin is healing well. Minimal tenderness. No anal fissure noted.   Musculoskeletal:      Cervical back: Normal range of motion.   Skin:     General: Skin is warm and dry.   Neurological:      Mental Status: She is alert and oriented to person, place, and time.           ASSESSMENT/PLAN:     Anal irritation, irritant contact dermatitis is improving significantly with current tx.    Recommend barrier cream applied to clean, dry skin. This can be used indefinitely.     Ok to stop antifungal cream    Avoid excessive wiping. avoid using wipes.    F/u as needed    "

## 2022-09-27 ENCOUNTER — TELEPHONE (OUTPATIENT)
Dept: FAMILY MEDICINE | Facility: CLINIC | Age: 82
End: 2022-09-27
Payer: MEDICARE

## 2022-09-27 DIAGNOSIS — Z12.31 BREAST CANCER SCREENING BY MAMMOGRAM: Primary | ICD-10-CM

## 2022-09-27 NOTE — TELEPHONE ENCOUNTER
----- Message from Shahnaz Hendrickson sent at 9/27/2022  8:35 AM CDT -----  Contact: Pt  Type:  Mammogram    Caller is requesting to schedule their annual mammogram appointment.  Order is not listed in EPIC.  Please enter order and contact patient to schedule.    Name of Caller:  Pt  Where would they like the mammogram performed?  [facility] Call Back Number:  [phone]  Additional Information: Please call back.  Thanks.

## 2022-09-27 NOTE — TELEPHONE ENCOUNTER
Last screening mammogram performed on 10-7-2021.  Screening mammogram orders placed. Mammogram appointment scheduled for the date of 10-10-22. Patient agreed to appointment date, time, and location.

## 2022-10-10 ENCOUNTER — HOSPITAL ENCOUNTER (OUTPATIENT)
Dept: RADIOLOGY | Facility: HOSPITAL | Age: 82
Discharge: HOME | End: 2022-10-10
Attending: STUDENT IN AN ORGANIZED HEALTH CARE EDUCATION/TRAINING PROGRAM
Payer: MEDICARE

## 2022-10-10 DIAGNOSIS — Z12.31 BREAST CANCER SCREENING BY MAMMOGRAM: ICD-10-CM

## 2022-10-10 PROCEDURE — 77063 BREAST TOMOSYNTHESIS BI: CPT | Mod: 26,,, | Performed by: RADIOLOGY

## 2022-10-10 PROCEDURE — 77063 BREAST TOMOSYNTHESIS BI: CPT | Mod: TC

## 2022-10-10 PROCEDURE — 77063 MAMMO DIGITAL SCREENING BILAT WITH TOMO: ICD-10-PCS | Mod: 26,,, | Performed by: RADIOLOGY

## 2022-10-10 PROCEDURE — 77067 SCR MAMMO BI INCL CAD: CPT | Mod: 26,,, | Performed by: RADIOLOGY

## 2022-10-10 PROCEDURE — 77067 MAMMO DIGITAL SCREENING BILAT WITH TOMO: ICD-10-PCS | Mod: 26,,, | Performed by: RADIOLOGY

## 2022-10-28 ENCOUNTER — PES CALL (OUTPATIENT)
Dept: ADMINISTRATIVE | Facility: CLINIC | Age: 82
End: 2022-10-28
Payer: MEDICARE

## 2022-11-04 ENCOUNTER — PES CALL (OUTPATIENT)
Dept: ADMINISTRATIVE | Facility: CLINIC | Age: 82
End: 2022-11-04
Payer: MEDICARE

## 2022-11-11 ENCOUNTER — PATIENT OUTREACH (OUTPATIENT)
Dept: ADMINISTRATIVE | Facility: HOSPITAL | Age: 82
End: 2022-11-11
Payer: MEDICARE

## 2022-12-19 ENCOUNTER — OFFICE VISIT (OUTPATIENT)
Dept: HOME HEALTH SERVICES | Facility: CLINIC | Age: 82
End: 2022-12-19
Payer: MEDICARE

## 2022-12-19 VITALS
WEIGHT: 140 LBS | SYSTOLIC BLOOD PRESSURE: 136 MMHG | OXYGEN SATURATION: 98 % | BODY MASS INDEX: 22.6 KG/M2 | DIASTOLIC BLOOD PRESSURE: 82 MMHG | HEART RATE: 67 BPM

## 2022-12-19 DIAGNOSIS — Z00.00 ENCOUNTER FOR PREVENTIVE HEALTH EXAMINATION: Primary | ICD-10-CM

## 2022-12-19 DIAGNOSIS — I35.0 MODERATE TO SEVERE AORTIC STENOSIS: ICD-10-CM

## 2022-12-19 DIAGNOSIS — I10 ESSENTIAL HYPERTENSION: ICD-10-CM

## 2022-12-19 PROCEDURE — G0439 PR MEDICARE ANNUAL WELLNESS SUBSEQUENT VISIT: ICD-10-PCS | Mod: S$GLB,,, | Performed by: NURSE PRACTITIONER

## 2022-12-19 PROCEDURE — 3075F SYST BP GE 130 - 139MM HG: CPT | Mod: CPTII,S$GLB,, | Performed by: NURSE PRACTITIONER

## 2022-12-19 PROCEDURE — 3079F DIAST BP 80-89 MM HG: CPT | Mod: CPTII,S$GLB,, | Performed by: NURSE PRACTITIONER

## 2022-12-19 PROCEDURE — 1126F PR PAIN SEVERITY QUANTIFIED, NO PAIN PRESENT: ICD-10-PCS | Mod: CPTII,S$GLB,, | Performed by: NURSE PRACTITIONER

## 2022-12-19 PROCEDURE — 3079F PR MOST RECENT DIASTOLIC BLOOD PRESSURE 80-89 MM HG: ICD-10-PCS | Mod: CPTII,S$GLB,, | Performed by: NURSE PRACTITIONER

## 2022-12-19 PROCEDURE — 1170F FXNL STATUS ASSESSED: CPT | Mod: CPTII,S$GLB,, | Performed by: NURSE PRACTITIONER

## 2022-12-19 PROCEDURE — 3288F PR FALLS RISK ASSESSMENT DOCUMENTED: ICD-10-PCS | Mod: CPTII,S$GLB,, | Performed by: NURSE PRACTITIONER

## 2022-12-19 PROCEDURE — 1159F PR MEDICATION LIST DOCUMENTED IN MEDICAL RECORD: ICD-10-PCS | Mod: CPTII,S$GLB,, | Performed by: NURSE PRACTITIONER

## 2022-12-19 PROCEDURE — 1160F RVW MEDS BY RX/DR IN RCRD: CPT | Mod: CPTII,S$GLB,, | Performed by: NURSE PRACTITIONER

## 2022-12-19 PROCEDURE — 1170F PR FUNCTIONAL STATUS ASSESSED: ICD-10-PCS | Mod: CPTII,S$GLB,, | Performed by: NURSE PRACTITIONER

## 2022-12-19 PROCEDURE — 1101F PR PT FALLS ASSESS DOC 0-1 FALLS W/OUT INJ PAST YR: ICD-10-PCS | Mod: CPTII,S$GLB,, | Performed by: NURSE PRACTITIONER

## 2022-12-19 PROCEDURE — 3075F PR MOST RECENT SYSTOLIC BLOOD PRESS GE 130-139MM HG: ICD-10-PCS | Mod: CPTII,S$GLB,, | Performed by: NURSE PRACTITIONER

## 2022-12-19 PROCEDURE — 1160F PR REVIEW ALL MEDS BY PRESCRIBER/CLIN PHARMACIST DOCUMENTED: ICD-10-PCS | Mod: CPTII,S$GLB,, | Performed by: NURSE PRACTITIONER

## 2022-12-19 PROCEDURE — G0439 PPPS, SUBSEQ VISIT: HCPCS | Mod: S$GLB,,, | Performed by: NURSE PRACTITIONER

## 2022-12-19 PROCEDURE — 1126F AMNT PAIN NOTED NONE PRSNT: CPT | Mod: CPTII,S$GLB,, | Performed by: NURSE PRACTITIONER

## 2022-12-19 PROCEDURE — 1159F MED LIST DOCD IN RCRD: CPT | Mod: CPTII,S$GLB,, | Performed by: NURSE PRACTITIONER

## 2022-12-19 PROCEDURE — 3288F FALL RISK ASSESSMENT DOCD: CPT | Mod: CPTII,S$GLB,, | Performed by: NURSE PRACTITIONER

## 2022-12-19 PROCEDURE — 1101F PT FALLS ASSESS-DOCD LE1/YR: CPT | Mod: CPTII,S$GLB,, | Performed by: NURSE PRACTITIONER

## 2022-12-19 RX ORDER — DOCUSATE SODIUM 250 MG
250 CAPSULE ORAL DAILY
COMMUNITY
End: 2023-09-12

## 2022-12-19 NOTE — PROGRESS NOTES
Divine Ray presented for a  Medicare AWV and comprehensive Health Risk Assessment today. The following components were reviewed and updated:    Medical history  Family History  Social history  Allergies and Current Medications  Health Risk Assessment  Health Maintenance  Care Team         ** See Completed Assessments for Annual Wellness Visit within the encounter summary.**         The following assessments were completed:  Living Situation  CAGE  Depression Screening  Timed Get Up and Go  Whisper Test  Cognitive Function Screening      Nutrition Screening  ADL Screening  PAQ Screening    Review for Opioid Screening: Patient does not have rx for Opioids.  Review for Substance Use Disorders: Patient does not use substance.      Vitals:    12/19/22 0931   BP: 136/82   Pulse: 67   SpO2: 98%   Weight: 63.5 kg (140 lb)     Body mass index is 22.6 kg/m².  Physical Exam  Vitals reviewed.   HENT:      Head: Normocephalic.   Eyes:      Pupils: Pupils are equal, round, and reactive to light.   Cardiovascular:      Rate and Rhythm: Normal rate and regular rhythm.      Heart sounds: Normal heart sounds.   Pulmonary:      Effort: Pulmonary effort is normal.      Breath sounds: Normal breath sounds.   Abdominal:      General: Bowel sounds are normal.      Palpations: Abdomen is soft.   Musculoskeletal:         General: Normal range of motion.      Cervical back: Normal range of motion.      Right lower leg: No edema.      Left lower leg: No edema.   Skin:     General: Skin is warm and dry.   Neurological:      Mental Status: She is alert and oriented to person, place, and time.   Psychiatric:         Behavior: Behavior normal.             Diagnoses and health risks identified today and associated recommendations/orders:    1. Encounter for preventive health examination  - Above assessments completed. Preventive measures and health maintenance reviewed with patient.  -discussed/encouraged overdue vaccines    2. Essential  hypertension  Stable, followed by PCP  -on amlodipine and losartan    3. Moderate to severe aortic stenosis  Stable, followed by PCP    4. BMI 22.0-22.9, adult      Provided Divine with a 5-10 year written screening schedule and personal prevention plan. Recommendations were developed using the USPSTF age appropriate recommendations. Education, counseling, and referrals were provided as needed. After Visit Summary printed and given to patient which includes a list of additional screenings\tests needed.    Follow up in about 1 year (around 12/19/2023) for your next annual wellness visit.    Jessica Giles NP    I offered to discuss advanced care planning, including how to pick a person who would make decisions for you if you were unable to make them for yourself, called a health care power of , and what kind of decisions you might make such as use of life sustaining treatments such as ventilators and tube feeding when faced with a life limiting illness recorded on a living will that they will need to know. (How you want to be cared for as you near the end of your natural life)     X Patient is interested in learning more about how to make advanced directives.  I provided them paperwork and offered to discuss this with them.

## 2022-12-19 NOTE — PATIENT INSTRUCTIONS
Counseling and Referral of Other Preventative  (Italic type indicates deductible and co-insurance are waived)    Patient Name: Divine Ray  Today's Date: 12/19/2022    Health Maintenance       Date Due Completion Date    TETANUS VACCINE Never done ---    Shingles Vaccine (2 of 3) 06/19/2012 4/24/2012    COVID-19 Vaccine (4 - Booster for Pfizer series) 11/22/2021 9/27/2021    DEXA Scan 04/23/2024 4/23/2021    Lipid Panel 04/14/2027 4/14/2022    Override on 9/2/2020: Done        No orders of the defined types were placed in this encounter.    The following information is provided to all patients.  This information is to help you find resources for any of the problems found today that may be affecting your health:                Living healthy guide: www.[facility].[place].gov      Understanding Diabetes: www.diabetes.org      Eating healthy: www.cdc.gov/healthyweight      CDC home safety checklist: www.cdc.gov/steadi/patient.html      Agency on Aging: www.goea.[place].[facility]      Alcoholics anonymous (AA): www.aa.org      Physical Activity: www.priyanka.nih.gov/bl3blhl      Tobacco use: www.quitwithusla.org

## 2022-12-23 ENCOUNTER — PATIENT MESSAGE (OUTPATIENT)
Dept: FAMILY MEDICINE | Facility: CLINIC | Age: 82
End: 2022-12-23
Payer: MEDICARE

## 2023-02-28 ENCOUNTER — TELEPHONE (OUTPATIENT)
Dept: FAMILY MEDICINE | Facility: CLINIC | Age: 83
End: 2023-02-28
Payer: MEDICARE

## 2023-02-28 DIAGNOSIS — I35.0 MODERATE TO SEVERE AORTIC STENOSIS: Primary | ICD-10-CM

## 2023-02-28 NOTE — TELEPHONE ENCOUNTER
Pt requesting labs before appointment in may. Can you please advise, thank you!        ----- Message from Yadi Jose Carlos sent at 2/28/2023  3:12 PM CST -----  Contact: patient  Type:  Needs Medical Advice    Who Called:  patient       Would the patient rather a call back or a response via [facility]? Call     Best Call Back Number: [phone] (home)      Additional Information:  Patient would like to speak with the nurse in regards to getting a paper order for labs sent to her house so she can go to [facility] to get lab work done.     Please call to advise

## 2023-03-13 ENCOUNTER — TELEPHONE (OUTPATIENT)
Dept: FAMILY MEDICINE | Facility: CLINIC | Age: 83
End: 2023-03-13
Payer: MEDICARE

## 2023-03-13 NOTE — TELEPHONE ENCOUNTER
Cynthia Richard Staff  Caller: self (Today,  4:24 PM)    ----- Message from Cynthia Guardado sent at 3/13/2023  4:24 PM CDT -----  Contact: self  Type:  Needs Medical Advice    Who Called: self  Would the patient rather a call back or a response via [facility]? call  Best Call Back Number: [phone] (home)       Additional Information: pt would like dr to prescribe her something for a hacking cough. Pt would not want a steroid. Please advise and thank you.

## 2023-03-13 NOTE — TELEPHONE ENCOUNTER
Appointment scheduled for tomorrow 3-14-23. Patient agreed to appointment date, time, and location.

## 2023-03-14 ENCOUNTER — OFFICE VISIT (OUTPATIENT)
Dept: FAMILY MEDICINE | Facility: CLINIC | Age: 83
End: 2023-03-14
Payer: MEDICARE

## 2023-03-14 VITALS
SYSTOLIC BLOOD PRESSURE: 132 MMHG | HEART RATE: 80 BPM | TEMPERATURE: 98 F | HEIGHT: 66 IN | OXYGEN SATURATION: 97 % | DIASTOLIC BLOOD PRESSURE: 70 MMHG | WEIGHT: 140 LBS | BODY MASS INDEX: 22.5 KG/M2

## 2023-03-14 DIAGNOSIS — J20.9 ACUTE BRONCHITIS, UNSPECIFIED ORGANISM: Primary | ICD-10-CM

## 2023-03-14 PROCEDURE — 99213 OFFICE O/P EST LOW 20 MIN: CPT | Mod: S$GLB,,, | Performed by: PHYSICIAN ASSISTANT

## 2023-03-14 PROCEDURE — 1126F AMNT PAIN NOTED NONE PRSNT: CPT | Mod: CPTII,S$GLB,, | Performed by: PHYSICIAN ASSISTANT

## 2023-03-14 PROCEDURE — 3288F PR FALLS RISK ASSESSMENT DOCUMENTED: ICD-10-PCS | Mod: CPTII,S$GLB,, | Performed by: PHYSICIAN ASSISTANT

## 2023-03-14 PROCEDURE — 1159F PR MEDICATION LIST DOCUMENTED IN MEDICAL RECORD: ICD-10-PCS | Mod: CPTII,S$GLB,, | Performed by: PHYSICIAN ASSISTANT

## 2023-03-14 PROCEDURE — 3075F SYST BP GE 130 - 139MM HG: CPT | Mod: CPTII,S$GLB,, | Performed by: PHYSICIAN ASSISTANT

## 2023-03-14 PROCEDURE — 99999 PR PBB SHADOW E&M-EST. PATIENT-LVL III: ICD-10-PCS | Mod: PBBFAC,,, | Performed by: PHYSICIAN ASSISTANT

## 2023-03-14 PROCEDURE — 1101F PR PT FALLS ASSESS DOC 0-1 FALLS W/OUT INJ PAST YR: ICD-10-PCS | Mod: CPTII,S$GLB,, | Performed by: PHYSICIAN ASSISTANT

## 2023-03-14 PROCEDURE — 1159F MED LIST DOCD IN RCRD: CPT | Mod: CPTII,S$GLB,, | Performed by: PHYSICIAN ASSISTANT

## 2023-03-14 PROCEDURE — 99213 PR OFFICE/OUTPT VISIT, EST, LEVL III, 20-29 MIN: ICD-10-PCS | Mod: S$GLB,,, | Performed by: PHYSICIAN ASSISTANT

## 2023-03-14 PROCEDURE — 3078F DIAST BP <80 MM HG: CPT | Mod: CPTII,S$GLB,, | Performed by: PHYSICIAN ASSISTANT

## 2023-03-14 PROCEDURE — 3288F FALL RISK ASSESSMENT DOCD: CPT | Mod: CPTII,S$GLB,, | Performed by: PHYSICIAN ASSISTANT

## 2023-03-14 PROCEDURE — 99999 PR PBB SHADOW E&M-EST. PATIENT-LVL III: CPT | Mod: PBBFAC,,, | Performed by: PHYSICIAN ASSISTANT

## 2023-03-14 PROCEDURE — 3078F PR MOST RECENT DIASTOLIC BLOOD PRESSURE < 80 MM HG: ICD-10-PCS | Mod: CPTII,S$GLB,, | Performed by: PHYSICIAN ASSISTANT

## 2023-03-14 PROCEDURE — 3075F PR MOST RECENT SYSTOLIC BLOOD PRESS GE 130-139MM HG: ICD-10-PCS | Mod: CPTII,S$GLB,, | Performed by: PHYSICIAN ASSISTANT

## 2023-03-14 PROCEDURE — 1126F PR PAIN SEVERITY QUANTIFIED, NO PAIN PRESENT: ICD-10-PCS | Mod: CPTII,S$GLB,, | Performed by: PHYSICIAN ASSISTANT

## 2023-03-14 PROCEDURE — 1101F PT FALLS ASSESS-DOCD LE1/YR: CPT | Mod: CPTII,S$GLB,, | Performed by: PHYSICIAN ASSISTANT

## 2023-03-14 RX ORDER — ALBUTEROL SULFATE 90 UG/1
2 AEROSOL, METERED RESPIRATORY (INHALATION) EVERY 6 HOURS PRN
Qty: 6.7 G | Refills: 0 | Status: SHIPPED | OUTPATIENT
Start: 2023-03-14 | End: 2023-08-30

## 2023-03-14 RX ORDER — BENZONATATE 200 MG/1
200 CAPSULE ORAL 3 TIMES DAILY PRN
Qty: 30 CAPSULE | Refills: 0 | Status: SHIPPED | OUTPATIENT
Start: 2023-03-14 | End: 2023-03-24

## 2023-03-14 NOTE — PROGRESS NOTES
Subjective:       Patient ID: Divine Ray is a 82 y.o. female.    Chief Complaint: Cough    Pt presents to clinic c/o cough (occasional clear sputum) with associated chest congestion for 1 week. Developed after gardening in heavy pollen conditions, denies particularly sensitivity to pollen in the past. Had some postnasal drip initially, resolved soon after. States cough seems mildly improved this morning. Denies fever, chills, sore throat, ear pain/pressure, rhinorrhea, body aches, shortness of breath. Denies any known sick contacts. Has been taking Mucinex DM and half a Zyrtec with limited relief. Reports sensitivity to Medrol dose pack side effects in the past.     Cough  Pertinent negatives include no chest pain, chills, ear pain, fever, headaches, myalgias, rash or sore throat.   Review of Systems   Constitutional:  Negative for chills, fatigue and fever.   HENT:  Positive for congestion. Negative for ear pain, sinus pressure and sore throat.    Respiratory:  Positive for cough.    Cardiovascular:  Negative for chest pain and leg swelling.   Gastrointestinal:  Negative for nausea and vomiting.   Musculoskeletal:  Negative for arthralgias and myalgias.   Skin:  Negative for rash.   Neurological:  Negative for dizziness and headaches.     Objective:      Physical Exam  Constitutional:       General: She is not in acute distress.     Appearance: She is not ill-appearing or toxic-appearing.   HENT:      Head: Normocephalic and atraumatic.      Nose: Nose normal.      Mouth/Throat:      Mouth: Mucous membranes are moist.      Pharynx: Oropharynx is clear.   Cardiovascular:      Rate and Rhythm: Normal rate and regular rhythm.      Heart sounds: Murmur (mid-systolic crescendo-decrescendo, radiating to carotids) heard.   Pulmonary:      Effort: Pulmonary effort is normal. No respiratory distress.      Breath sounds: Wheezing (expriatory) and rhonchi (diffuse) present.   Chest:      Chest wall: No tenderness.  "  Lymphadenopathy:      Cervical: No cervical adenopathy.   Skin:     General: Skin is warm and dry.   Neurological:      Mental Status: She is alert and oriented to person, place, and time.   Psychiatric:         Mood and Affect: Mood normal.         Behavior: Behavior normal.         Thought Content: Thought content normal.         Judgment: Judgment normal.       Assessment:       1. Acute bronchitis, unspecified organism          Plan:       Divine was seen today for cough.    Diagnoses and all orders for this visit:    Acute bronchitis, unspecified organism  -     benzonatate (TESSALON) 200 MG capsule; Take 1 capsule (200 mg total) by mouth 3 (three) times daily as needed for Cough.  -     albuterol (PROVENTIL HFA) 90 mcg/actuation inhaler; Inhale 2 puffs into the lungs every 6 (six) hours as needed (cough/ wheezing). Rescue         She is to notify me if no improvement -consider phenergan Dm +/- steroid injection if no relief.  Doubt need for abx at this time               Documentation entered by me for this encounter may have been done in part using speech-recognition technology. Although I have made an effort to ensure accuracy, "sound like" errors may exist and should be interpreted in context.  patient was seen and examined by me and I agree with HPI, ROS, PE as well as  assessment and plan       "

## 2023-03-15 ENCOUNTER — OFFICE VISIT (OUTPATIENT)
Dept: DERMATOLOGY | Facility: CLINIC | Age: 83
End: 2023-03-15
Payer: MEDICARE

## 2023-03-15 ENCOUNTER — LAB VISIT (OUTPATIENT)
Dept: LAB | Facility: HOSPITAL | Age: 83
End: 2023-03-15
Attending: DERMATOLOGY
Payer: MEDICARE

## 2023-03-15 VITALS — HEIGHT: 65 IN | BODY MASS INDEX: 23.16 KG/M2 | WEIGHT: 139 LBS

## 2023-03-15 DIAGNOSIS — L98.9 DISEASE OF SKIN AND SUBCUTANEOUS TISSUE: ICD-10-CM

## 2023-03-15 DIAGNOSIS — L98.9 DISEASE OF SKIN AND SUBCUTANEOUS TISSUE: Primary | ICD-10-CM

## 2023-03-15 LAB
ALBUMIN SERPL BCP-MCNC: 4 G/DL (ref 3.5–5.2)
ALP SERPL-CCNC: 81 U/L (ref 55–135)
ALT SERPL W/O P-5'-P-CCNC: 18 U/L (ref 10–44)
ANION GAP SERPL CALC-SCNC: 11 MMOL/L (ref 8–16)
AST SERPL-CCNC: 16 U/L (ref 10–40)
BASOPHILS # BLD AUTO: 0.06 K/UL (ref 0–0.2)
BASOPHILS NFR BLD: 1 % (ref 0–1.9)
BILIRUB SERPL-MCNC: 0.7 MG/DL (ref 0.1–1)
BUN SERPL-MCNC: 8 MG/DL (ref 8–23)
CALCIUM SERPL-MCNC: 9.9 MG/DL (ref 8.7–10.5)
CHLORIDE SERPL-SCNC: 106 MMOL/L (ref 95–110)
CO2 SERPL-SCNC: 24 MMOL/L (ref 23–29)
CREAT SERPL-MCNC: 0.8 MG/DL (ref 0.5–1.4)
DIFFERENTIAL METHOD: NORMAL
EOSINOPHIL # BLD AUTO: 0.2 K/UL (ref 0–0.5)
EOSINOPHIL NFR BLD: 2.9 % (ref 0–8)
ERYTHROCYTE [DISTWIDTH] IN BLOOD BY AUTOMATED COUNT: 12.8 % (ref 11.5–14.5)
EST. GFR  (NO RACE VARIABLE): >60 ML/MIN/1.73 M^2
GLUCOSE SERPL-MCNC: 97 MG/DL (ref 70–110)
HCT VFR BLD AUTO: 42 % (ref 37–48.5)
HGB BLD-MCNC: 13.7 G/DL (ref 12–16)
IMM GRANULOCYTES # BLD AUTO: 0.01 K/UL (ref 0–0.04)
IMM GRANULOCYTES NFR BLD AUTO: 0.2 % (ref 0–0.5)
LYMPHOCYTES # BLD AUTO: 1.7 K/UL (ref 1–4.8)
LYMPHOCYTES NFR BLD: 28.7 % (ref 18–48)
MCH RBC QN AUTO: 29.7 PG (ref 27–31)
MCHC RBC AUTO-ENTMCNC: 32.6 G/DL (ref 32–36)
MCV RBC AUTO: 91 FL (ref 82–98)
MONOCYTES # BLD AUTO: 0.5 K/UL (ref 0.3–1)
MONOCYTES NFR BLD: 8.1 % (ref 4–15)
NEUTROPHILS # BLD AUTO: 3.5 K/UL (ref 1.8–7.7)
NEUTROPHILS NFR BLD: 59.1 % (ref 38–73)
NRBC BLD-RTO: 0 /100 WBC
PLATELET # BLD AUTO: 201 K/UL (ref 150–450)
PMV BLD AUTO: 10.4 FL (ref 9.2–12.9)
POTASSIUM SERPL-SCNC: 3.7 MMOL/L (ref 3.5–5.1)
PROT SERPL-MCNC: 7.1 G/DL (ref 6–8.4)
RBC # BLD AUTO: 4.61 M/UL (ref 4–5.4)
SODIUM SERPL-SCNC: 141 MMOL/L (ref 136–145)
WBC # BLD AUTO: 5.96 K/UL (ref 3.9–12.7)

## 2023-03-15 PROCEDURE — 1160F PR REVIEW ALL MEDS BY PRESCRIBER/CLIN PHARMACIST DOCUMENTED: ICD-10-PCS | Mod: CPTII,S$GLB,, | Performed by: DERMATOLOGY

## 2023-03-15 PROCEDURE — 1159F PR MEDICATION LIST DOCUMENTED IN MEDICAL RECORD: ICD-10-PCS | Mod: CPTII,S$GLB,, | Performed by: DERMATOLOGY

## 2023-03-15 PROCEDURE — 88342 IMHCHEM/IMCYTCHM 1ST ANTB: CPT | Mod: 26,,, | Performed by: PATHOLOGY

## 2023-03-15 PROCEDURE — 88342 CHG IMMUNOCYTOCHEMISTRY: ICD-10-PCS | Mod: 26,,, | Performed by: PATHOLOGY

## 2023-03-15 PROCEDURE — 3288F PR FALLS RISK ASSESSMENT DOCUMENTED: ICD-10-PCS | Mod: CPTII,S$GLB,, | Performed by: DERMATOLOGY

## 2023-03-15 PROCEDURE — 88313 PR  SPECIAL STAINS,GROUP II: ICD-10-PCS | Mod: 26,,, | Performed by: PATHOLOGY

## 2023-03-15 PROCEDURE — 88341 IMHCHEM/IMCYTCHM EA ADD ANTB: CPT | Mod: 26,,, | Performed by: PATHOLOGY

## 2023-03-15 PROCEDURE — 99999 PR PBB SHADOW E&M-EST. PATIENT-LVL III: ICD-10-PCS | Mod: PBBFAC,,, | Performed by: DERMATOLOGY

## 2023-03-15 PROCEDURE — 99999 PR PBB SHADOW E&M-EST. PATIENT-LVL III: CPT | Mod: PBBFAC,,, | Performed by: DERMATOLOGY

## 2023-03-15 PROCEDURE — 88313 SPECIAL STAINS GROUP 2: CPT | Mod: 26,,, | Performed by: PATHOLOGY

## 2023-03-15 PROCEDURE — 88305 TISSUE EXAM BY PATHOLOGIST: CPT | Mod: 26,,, | Performed by: PATHOLOGY

## 2023-03-15 PROCEDURE — 88305 TISSUE EXAM BY PATHOLOGIST: ICD-10-PCS | Mod: 26,,, | Performed by: PATHOLOGY

## 2023-03-15 PROCEDURE — 88342 IMHCHEM/IMCYTCHM 1ST ANTB: CPT | Performed by: PATHOLOGY

## 2023-03-15 PROCEDURE — 11104 PUNCH BX SKIN SINGLE LESION: CPT | Mod: S$GLB,,, | Performed by: DERMATOLOGY

## 2023-03-15 PROCEDURE — 99214 PR OFFICE/OUTPT VISIT, EST, LEVL IV, 30-39 MIN: ICD-10-PCS | Mod: 25,S$GLB,, | Performed by: DERMATOLOGY

## 2023-03-15 PROCEDURE — 11104 PR PUNCH BIOPSY, SKIN, SINGLE LESION: ICD-10-PCS | Mod: S$GLB,,, | Performed by: DERMATOLOGY

## 2023-03-15 PROCEDURE — 1160F RVW MEDS BY RX/DR IN RCRD: CPT | Mod: CPTII,S$GLB,, | Performed by: DERMATOLOGY

## 2023-03-15 PROCEDURE — 99214 OFFICE O/P EST MOD 30 MIN: CPT | Mod: 25,S$GLB,, | Performed by: DERMATOLOGY

## 2023-03-15 PROCEDURE — 1101F PR PT FALLS ASSESS DOC 0-1 FALLS W/OUT INJ PAST YR: ICD-10-PCS | Mod: CPTII,S$GLB,, | Performed by: DERMATOLOGY

## 2023-03-15 PROCEDURE — 88341 PR IHC OR ICC EACH ADD'L SINGLE ANTIBODY  STAINPR: ICD-10-PCS | Mod: 26,,, | Performed by: PATHOLOGY

## 2023-03-15 PROCEDURE — 85025 COMPLETE CBC W/AUTO DIFF WBC: CPT | Performed by: DERMATOLOGY

## 2023-03-15 PROCEDURE — 88305 TISSUE EXAM BY PATHOLOGIST: CPT | Performed by: PATHOLOGY

## 2023-03-15 PROCEDURE — 80053 COMPREHEN METABOLIC PANEL: CPT | Performed by: DERMATOLOGY

## 2023-03-15 PROCEDURE — 88313 SPECIAL STAINS GROUP 2: CPT | Performed by: PATHOLOGY

## 2023-03-15 PROCEDURE — 3288F FALL RISK ASSESSMENT DOCD: CPT | Mod: CPTII,S$GLB,, | Performed by: DERMATOLOGY

## 2023-03-15 PROCEDURE — 1159F MED LIST DOCD IN RCRD: CPT | Mod: CPTII,S$GLB,, | Performed by: DERMATOLOGY

## 2023-03-15 PROCEDURE — 88341 IMHCHEM/IMCYTCHM EA ADD ANTB: CPT | Performed by: PATHOLOGY

## 2023-03-15 PROCEDURE — 1101F PT FALLS ASSESS-DOCD LE1/YR: CPT | Mod: CPTII,S$GLB,, | Performed by: DERMATOLOGY

## 2023-03-15 PROCEDURE — 36415 COLL VENOUS BLD VENIPUNCTURE: CPT | Performed by: DERMATOLOGY

## 2023-03-15 RX ORDER — TRIAMCINOLONE ACETONIDE 1 MG/G
CREAM TOPICAL
Qty: 454 G | Refills: 3 | Status: ON HOLD | OUTPATIENT
Start: 2023-03-15 | End: 2023-08-31 | Stop reason: HOSPADM

## 2023-03-15 RX ORDER — LEVOCETIRIZINE DIHYDROCHLORIDE 5 MG/1
5 TABLET, FILM COATED ORAL NIGHTLY
Qty: 30 TABLET | Refills: 11 | Status: ON HOLD | OUTPATIENT
Start: 2023-03-15 | End: 2023-08-31 | Stop reason: HOSPADM

## 2023-03-15 NOTE — PATIENT INSTRUCTIONS
Punch Biopsy Wound Care    Your doctor has performed a punch biopsy today.  A band aid and antibiotic ointment has been placed over the site.  This should remain in place for 24 hours.  It is recommended that you keep the area dry for the first 24 hours.  After 24 hours, you may remove the band aid and wash the area with warm soap and water and apply Vaseline jelly.  Many patients prefer to use Neosporin or Bacitracin ointment.  This is acceptable; however know that you can develop an allergy to this medication even if you have used it safely for years.  It is important to keep the area moist.  Letting it dry out and get air slows healing time, will worsen the scar, and make it more difficult to remove the stitches if they were placed.  Band aid is optional after first 24 hours.      If you notice increasing redness, tenderness, pain, or yellow drainage at the biopsy or surgical site, please notify your doctor.  These are signs of an infection.    If your biopsy/surgical site is bleeding, apply firm pressure for 15 minutes straight.  Repeat for another 15 minutes, if it is still bleeding.   If the surgical site continues to bleed, then please contact your doctor.     [facility]  [facility]ER[facility]  [address] [place] [address], [address] [address]  Dept: [phone]

## 2023-03-16 ENCOUNTER — TELEPHONE (OUTPATIENT)
Dept: DERMATOLOGY | Facility: CLINIC | Age: 83
End: 2023-03-16
Payer: MEDICARE

## 2023-03-16 NOTE — TELEPHONE ENCOUNTER
Spoke with patient, advised stool softener was okay to take and also the antihistamine.     ----- Message from Susan Felder sent at 3/15/2023 11:36 AM CDT -----  Contact: Patient  Type:  Needs Medical Advice    Who Called: Patient       Pharmacy name and phone #:    [id number] - [place] - [address] [facility] [address]  [facility] [place] [address]  Phone: [phone] Fax: [phone]      Would the patient rather a call back or a response via [facility]? Call    Best Call Back Number: [phone] (home)     Additional Information: Patient has questions about stool softener. Please call to advise

## 2023-03-24 LAB
FINAL PATHOLOGIC DIAGNOSIS: NORMAL
GROSS: NORMAL
Lab: NORMAL
MICROSCOPIC EXAM: NORMAL

## 2023-03-27 ENCOUNTER — TELEPHONE (OUTPATIENT)
Dept: DERMATOLOGY | Facility: CLINIC | Age: 83
End: 2023-03-27
Payer: MEDICARE

## 2023-03-27 NOTE — TELEPHONE ENCOUNTER
----- Message from Key Lockwood MD sent at 3/24/2023  5:57 PM CDT -----  FU 3/29/2023, will discuss results    1. Skin, left abdomen, punch biopsy:   - INTERSTITIAL GRANULOMATOUS DERMATITIS (SEE COMMENT).   COMMENT: The differential diagnosis includes interstitial granulomatous   dermatitis, interstitial granuloma annulare, or an interstitial granulomatous   drug eruption, which is favored given the clinical presentation.

## 2023-03-29 ENCOUNTER — OFFICE VISIT (OUTPATIENT)
Dept: DERMATOLOGY | Facility: CLINIC | Age: 83
End: 2023-03-29
Payer: MEDICARE

## 2023-03-29 VITALS — WEIGHT: 139 LBS | HEIGHT: 65 IN | BODY MASS INDEX: 23.16 KG/M2

## 2023-03-29 DIAGNOSIS — L92.0 GRANULOMA ANNULARE: Primary | ICD-10-CM

## 2023-03-29 PROCEDURE — 3288F FALL RISK ASSESSMENT DOCD: CPT | Mod: CPTII,S$GLB,, | Performed by: DERMATOLOGY

## 2023-03-29 PROCEDURE — 1159F PR MEDICATION LIST DOCUMENTED IN MEDICAL RECORD: ICD-10-PCS | Mod: CPTII,S$GLB,, | Performed by: DERMATOLOGY

## 2023-03-29 PROCEDURE — 99213 OFFICE O/P EST LOW 20 MIN: CPT | Mod: S$GLB,,, | Performed by: DERMATOLOGY

## 2023-03-29 PROCEDURE — 99213 PR OFFICE/OUTPT VISIT, EST, LEVL III, 20-29 MIN: ICD-10-PCS | Mod: S$GLB,,, | Performed by: DERMATOLOGY

## 2023-03-29 PROCEDURE — 1101F PT FALLS ASSESS-DOCD LE1/YR: CPT | Mod: CPTII,S$GLB,, | Performed by: DERMATOLOGY

## 2023-03-29 PROCEDURE — 3288F PR FALLS RISK ASSESSMENT DOCUMENTED: ICD-10-PCS | Mod: CPTII,S$GLB,, | Performed by: DERMATOLOGY

## 2023-03-29 PROCEDURE — 1159F MED LIST DOCD IN RCRD: CPT | Mod: CPTII,S$GLB,, | Performed by: DERMATOLOGY

## 2023-03-29 PROCEDURE — 99999 PR PBB SHADOW E&M-EST. PATIENT-LVL III: CPT | Mod: PBBFAC,,, | Performed by: DERMATOLOGY

## 2023-03-29 PROCEDURE — 1160F PR REVIEW ALL MEDS BY PRESCRIBER/CLIN PHARMACIST DOCUMENTED: ICD-10-PCS | Mod: CPTII,S$GLB,, | Performed by: DERMATOLOGY

## 2023-03-29 PROCEDURE — 1160F RVW MEDS BY RX/DR IN RCRD: CPT | Mod: CPTII,S$GLB,, | Performed by: DERMATOLOGY

## 2023-03-29 PROCEDURE — 1101F PR PT FALLS ASSESS DOC 0-1 FALLS W/OUT INJ PAST YR: ICD-10-PCS | Mod: CPTII,S$GLB,, | Performed by: DERMATOLOGY

## 2023-03-29 PROCEDURE — 99999 PR PBB SHADOW E&M-EST. PATIENT-LVL III: ICD-10-PCS | Mod: PBBFAC,,, | Performed by: DERMATOLOGY

## 2023-03-29 NOTE — PROGRESS NOTES
Subjective:      Patient ID:  Divine Ray is a 82 y.o. female who presents for   Chief Complaint   Patient presents with    Follow-up     Rash     LOV 3/15/23- Disease of skin and subcutaneous tissue  rash/ redness to buttocks, waist x 2 weeks at the time  Pt states no symptoms presents with rash, no itch  Only daily meds are amlodipine and losartan, on same meds for 10+ years  One vitamin had been introduced as different: folic acid 1800mcg  Held as well as her B12 supplement      1. Skin, left abdomen, punch biopsy:   - INTERSTITIAL GRANULOMATOUS DERMATITIS (SEE COMMENT).   COMMENT: The differential diagnosis includes interstitial granulomatous   dermatitis, interstitial granuloma annulare, or an interstitial granulomatous   drug eruption, which is favored given the clinical presentation.   Sections show histiocytes arranged in an infiltrative and partially palisaded   pattern within the superficial to upper mid dermis in association with mild   mucin deposition and perivascular lymphocytes.  Well defined necrobiosis is   not appreciated.  CD 68 immunohistochemical stain highlights prominent   interstitial and palisaded histiocytes within the superficial to upper mid   reticular dermis.  Colloidal iron stain shows mildly increased interstitial   mucin within the superficial dermis.   immunohistochemical stain   highlights a normal distribution of mast cells within the dermis.   Appropriately reactive controls were reviewed.  Multiple levels were examined.  BV    Patient here today for F/U on bx results.   Pt using triamcinolone and xyzal as instructed, started on Sunday (only 3 days ago)  Pt states she notices some improvement  Not itchy    Derm HX:  Denies Phx of NMSC  Denies Fhx of MM    Current Outpatient Medications:   ·  albuterol (PROVENTIL HFA) 90 mcg/actuation inhaler, Inhale 2 puffs into the lungs every 6 (six) hours as needed (cough/ wheezing). Rescue, Disp: 6.7 g, Rfl: 0  ·  amLODIPine  (NORVASC) 5 MG tablet, TAKE 1 TABLET BY MOUTH EVERY DAY, Disp: 90 tablet, Rfl: 3  ·  BENEFIBER, GUAR GUM, ORAL, Take by mouth., Disp: , Rfl:   ·  calcium carbonate/vitamin D3 (CALTRATE-600 PLUS VITAMIN D3 ORAL), Take 1 tablet by mouth once daily., Disp: , Rfl:   ·  clobetasol 0.05% (TEMOVATE) 0.05 % Oint, APPLY TOPICALLY TO THE AFFECTED AREA DAILY AS NEEDED, Disp: 45 g, Rfl: 0  ·  docusate sodium (COLACE) 250 MG capsule, Take 250 mg by mouth once daily., Disp: , Rfl:   ·  levocetirizine (XYZAL) 5 MG tablet, Take 1 tablet (5 mg total) by mouth every evening., Disp: 30 tablet, Rfl: 11  ·  losartan (COZAAR) 100 MG tablet, Take 1 tablet (100 mg total) by mouth once daily., Disp: 90 tablet, Rfl: 3  ·  miconazole (MICOTIN) 2 % cream, Apply topically 2 (two) times daily., Disp: 28 g, Rfl: 0  ·  triamcinolone acetonide 0.1% (KENALOG) 0.1 % cream, AAA bid, Disp: 454 g, Rfl: 3        Review of Systems   Constitutional:  Negative for fever, chills and fatigue.   Respiratory:  Negative for cough and shortness of breath.    Skin:  Positive for daily sunscreen use and activity-related sunscreen use. Negative for itching, rash and dry skin.     Objective:   Physical Exam   Constitutional: She appears well-developed and well-nourished. No distress.   Neurological: She is alert and oriented to person, place, and time. She is not disoriented.   Psychiatric: She has a normal mood and affect.   Skin:   Areas Examined (abnormalities noted in diagram):   Chest / Axilla Inspection Performed  Abdomen Inspection Performed  Back Inspection Performed  RUE Inspected  LUE Inspection Performed  RLE Inspected  LLE Inspection Performed               Diagram Legend     Erythematous scaling macule/papule c/w actinic keratosis       Vascular papule c/w angioma      Pigmented verrucoid papule/plaque c/w seborrheic keratosis      Yellow umbilicated papule c/w sebaceous hyperplasia      Irregularly shaped tan macule c/w lentigo     1-2 mm smooth white  papules consistent with Milia      Movable subcutaneous cyst with punctum c/w epidermal inclusion cyst      Subcutaneous movable cyst c/w pilar cyst      Firm pink to brown papule c/w dermatofibroma      Pedunculated fleshy papule(s) c/w skin tag(s)      Evenly pigmented macule c/w junctional nevus     Mildly variegated pigmented, slightly irregular-bordered macule c/w mildly atypical nevus      Flesh colored to evenly pigmented papule c/w intradermal nevus       Pink pearly papule/plaque c/w basal cell carcinoma      Erythematous hyperkeratotic cursted plaque c/w SCC      Surgical scar with no sign of skin cancer recurrence      Open and closed comedones      Inflammatory papules and pustules      Verrucoid papule consistent consistent with wart     Erythematous eczematous patches and plaques     Dystrophic onycholytic nail with subungual debris c/w onychomycosis     Umbilicated papule    Erythematous-base heme-crusted tan verrucoid plaque consistent with inflamed seborrheic keratosis     Erythematous Silvery Scaling Plaque c/w Psoriasis     See annotation     Latest Reference Range & Units 03/15/23 11:11   WBC 3.90 - 12.70 K/uL 5.96   RBC 4.00 - 5.40 M/uL 4.61   Hemoglobin 12.0 - 16.0 g/dL 13.7   Hematocrit 37.0 - 48.5 % 42.0   MCV 82 - 98 fL 91   MCH 27.0 - 31.0 pg 29.7   MCHC 32.0 - 36.0 g/dL 32.6   RDW 11.5 - 14.5 % 12.8   Platelets 150 - 450 K/uL 201   MPV 9.2 - 12.9 fL 10.4   Gran % 38.0 - 73.0 % 59.1   Lymph % 18.0 - 48.0 % 28.7   Mono % 4.0 - 15.0 % 8.1   Eosinophil % 0.0 - 8.0 % 2.9   Basophil % 0.0 - 1.9 % 1.0   Immature Granulocytes 0.0 - 0.5 % 0.2   Gran # (ANC) 1.8 - 7.7 K/uL 3.5   Lymph # 1.0 - 4.8 K/uL 1.7   Mono # 0.3 - 1.0 K/uL 0.5   Eos # 0.0 - 0.5 K/uL 0.2   Baso # 0.00 - 0.20 K/uL 0.06   Immature Grans (Abs) 0.00 - 0.04 K/uL 0.01   nRBC 0 /100 WBC 0   Differential Method  Automated   Sodium 136 - 145 mmol/L 141   Potassium 3.5 - 5.1 mmol/L 3.7   Chloride 95 - 110 mmol/L 106   CO2 23 - 29 mmol/L  24   Anion Gap 8 - 16 mmol/L 11   BUN 8 - 23 mg/dL 8   Creatinine 0.5 - 1.4 mg/dL 0.8   eGFR >60 mL/min/1.73 m^2 >60   Glucose 70 - 110 mg/dL 97   Calcium 8.7 - 10.5 mg/dL 9.9   Alkaline Phosphatase 55 - 135 U/L 81   PROTEIN TOTAL 6.0 - 8.4 g/dL 7.1   Albumin 3.5 - 5.2 g/dL 4.0   BILIRUBIN TOTAL 0.1 - 1.0 mg/dL 0.7   AST 10 - 40 U/L 16   ALT 10 - 44 U/L 18                   Assessment / Plan:        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   On losartan and amlodipine for decade.  Allow more time off supplement and on triamcinolone  Will ask PCP to attempt med substitutions if fail to clear  Addition of plaquenil     INTERSTITIAL GRANULOMATOUS DRUG REACTION  COMMON:   Calcium channel blockers: (diltiazem, verapamil, nifedipine)  statins: (simvastatin, pravastatin, lovastatin)  Furosemide  Beta-blockers (labetalol, metoprolol, atenolol, propranolol)  Antihistamines (H1 or H2 blockers): famotidine, cimetidine, ranitidine, brompheniramine  ACEi  enalapril, lisinopril  TNF-? inhibitors: infliximab, etanercept, adalimumab; lenalidomide, thalidomide  Anakinra  UNCOMMON:  Hydrochlorothiazide (HCTZ)  Carbamazepine  Diazepam  Bupropion  Darifenacin  Sennoside           No follow-ups on file.

## 2023-04-11 ENCOUNTER — TELEPHONE (OUTPATIENT)
Dept: FAMILY MEDICINE | Facility: CLINIC | Age: 83
End: 2023-04-11
Payer: MEDICARE

## 2023-04-11 NOTE — TELEPHONE ENCOUNTER
Call placed to patient for notification. Patient verbalized understanding.          Jose Manuel Landrum MD  You 59 minutes ago (12:39 PM)       Yes, that is fine.

## 2023-04-11 NOTE — TELEPHONE ENCOUNTER
Chad Richard Staff    ----- Message from Chad Benson sent at 4/11/2023 12:14 PM CDT -----  Regarding: blood orders  Contact: Patient  Patient had bloodwork done March 15th with Dr Lockwood, patient want to know if Dr Landrum can use the same bloodwork, please call back at [phone] (home)

## 2023-05-22 ENCOUNTER — PES CALL (OUTPATIENT)
Dept: ADMINISTRATIVE | Facility: CLINIC | Age: 83
End: 2023-05-22
Payer: MEDICARE

## 2023-05-23 ENCOUNTER — OFFICE VISIT (OUTPATIENT)
Dept: FAMILY MEDICINE | Facility: CLINIC | Age: 83
End: 2023-05-23
Payer: MEDICARE

## 2023-05-23 VITALS
HEART RATE: 82 BPM | RESPIRATION RATE: 18 BRPM | SYSTOLIC BLOOD PRESSURE: 122 MMHG | DIASTOLIC BLOOD PRESSURE: 82 MMHG | HEIGHT: 65 IN | WEIGHT: 140 LBS | OXYGEN SATURATION: 96 % | BODY MASS INDEX: 23.32 KG/M2

## 2023-05-23 DIAGNOSIS — I10 ESSENTIAL HYPERTENSION: ICD-10-CM

## 2023-05-23 DIAGNOSIS — I35.0 MODERATE TO SEVERE AORTIC STENOSIS: ICD-10-CM

## 2023-05-23 DIAGNOSIS — Z00.00 ROUTINE GENERAL MEDICAL EXAMINATION AT A HEALTH CARE FACILITY: Primary | ICD-10-CM

## 2023-05-23 PROCEDURE — 1160F PR REVIEW ALL MEDS BY PRESCRIBER/CLIN PHARMACIST DOCUMENTED: ICD-10-PCS | Mod: CPTII,S$GLB,, | Performed by: STUDENT IN AN ORGANIZED HEALTH CARE EDUCATION/TRAINING PROGRAM

## 2023-05-23 PROCEDURE — 99999 PR PBB SHADOW E&M-EST. PATIENT-LVL IV: ICD-10-PCS | Mod: PBBFAC,,, | Performed by: STUDENT IN AN ORGANIZED HEALTH CARE EDUCATION/TRAINING PROGRAM

## 2023-05-23 PROCEDURE — 99213 PR OFFICE/OUTPT VISIT, EST, LEVL III, 20-29 MIN: ICD-10-PCS | Mod: S$GLB,,, | Performed by: STUDENT IN AN ORGANIZED HEALTH CARE EDUCATION/TRAINING PROGRAM

## 2023-05-23 PROCEDURE — 1159F MED LIST DOCD IN RCRD: CPT | Mod: CPTII,S$GLB,, | Performed by: STUDENT IN AN ORGANIZED HEALTH CARE EDUCATION/TRAINING PROGRAM

## 2023-05-23 PROCEDURE — 99213 OFFICE O/P EST LOW 20 MIN: CPT | Mod: S$GLB,,, | Performed by: STUDENT IN AN ORGANIZED HEALTH CARE EDUCATION/TRAINING PROGRAM

## 2023-05-23 PROCEDURE — 1160F RVW MEDS BY RX/DR IN RCRD: CPT | Mod: CPTII,S$GLB,, | Performed by: STUDENT IN AN ORGANIZED HEALTH CARE EDUCATION/TRAINING PROGRAM

## 2023-05-23 PROCEDURE — 99999 PR PBB SHADOW E&M-EST. PATIENT-LVL IV: CPT | Mod: PBBFAC,,, | Performed by: STUDENT IN AN ORGANIZED HEALTH CARE EDUCATION/TRAINING PROGRAM

## 2023-05-23 PROCEDURE — 1126F PR PAIN SEVERITY QUANTIFIED, NO PAIN PRESENT: ICD-10-PCS | Mod: CPTII,S$GLB,, | Performed by: STUDENT IN AN ORGANIZED HEALTH CARE EDUCATION/TRAINING PROGRAM

## 2023-05-23 PROCEDURE — 3074F PR MOST RECENT SYSTOLIC BLOOD PRESSURE < 130 MM HG: ICD-10-PCS | Mod: CPTII,S$GLB,, | Performed by: STUDENT IN AN ORGANIZED HEALTH CARE EDUCATION/TRAINING PROGRAM

## 2023-05-23 PROCEDURE — 1126F AMNT PAIN NOTED NONE PRSNT: CPT | Mod: CPTII,S$GLB,, | Performed by: STUDENT IN AN ORGANIZED HEALTH CARE EDUCATION/TRAINING PROGRAM

## 2023-05-23 PROCEDURE — 3074F SYST BP LT 130 MM HG: CPT | Mod: CPTII,S$GLB,, | Performed by: STUDENT IN AN ORGANIZED HEALTH CARE EDUCATION/TRAINING PROGRAM

## 2023-05-23 PROCEDURE — 3079F DIAST BP 80-89 MM HG: CPT | Mod: CPTII,S$GLB,, | Performed by: STUDENT IN AN ORGANIZED HEALTH CARE EDUCATION/TRAINING PROGRAM

## 2023-05-23 PROCEDURE — 1159F PR MEDICATION LIST DOCUMENTED IN MEDICAL RECORD: ICD-10-PCS | Mod: CPTII,S$GLB,, | Performed by: STUDENT IN AN ORGANIZED HEALTH CARE EDUCATION/TRAINING PROGRAM

## 2023-05-23 PROCEDURE — 3079F PR MOST RECENT DIASTOLIC BLOOD PRESSURE 80-89 MM HG: ICD-10-PCS | Mod: CPTII,S$GLB,, | Performed by: STUDENT IN AN ORGANIZED HEALTH CARE EDUCATION/TRAINING PROGRAM

## 2023-05-23 RX ORDER — LOSARTAN POTASSIUM 100 MG/1
100 TABLET ORAL DAILY
Qty: 90 TABLET | Refills: 4 | Status: SHIPPED | OUTPATIENT
Start: 2023-05-23 | End: 2023-10-16

## 2023-05-23 RX ORDER — AMLODIPINE BESYLATE 5 MG/1
5 TABLET ORAL DAILY
Qty: 90 TABLET | Refills: 4 | Status: SHIPPED | OUTPATIENT
Start: 2023-05-23 | End: 2023-09-12 | Stop reason: DRUGHIGH

## 2023-05-23 NOTE — PROGRESS NOTES
"[facility] CLINIC NOTE    Patient Name: Divine Ray  YOB: 1940    PRESENTING HISTORY     History of Present Illness:  Ms. Divine Ray is a 82 y.o. female     Laid 50 bags of mulch this spring.   No chest pain or dyspnea associated.   Sleeps on 1 pillow    Severe AS.   She declines Echo.   Declines Cardiology appointment.     HTN is well controlled.         ROS      OBJECTIVE:   Vital Signs:  Vitals:    05/23/23 1355   BP: 122/82   Pulse: 82   Resp: 18   SpO2: 96%   Weight: 63.5 kg (139 lb 15.9 oz)   Height: 5' 5" (1.651 m)         Physical Exam  Vitals and nursing note reviewed.   Constitutional:       Appearance: She is not diaphoretic.   HENT:      Head: Normocephalic and atraumatic.      Right Ear: External ear normal.      Left Ear: External ear normal.   Eyes:      General: No scleral icterus.     Conjunctiva/sclera: Conjunctivae normal.      Pupils: Pupils are equal, round, and reactive to light.   Neck:      Thyroid: No thyromegaly.   Cardiovascular:      Rate and Rhythm: Normal rate and regular rhythm.      Heart sounds: Murmur heard.   Pulmonary:      Effort: Pulmonary effort is normal. No respiratory distress.      Breath sounds: Normal breath sounds. No wheezing or rales.   Musculoskeletal:         General: No tenderness or deformity. Normal range of motion.      Cervical back: Normal range of motion and neck supple.      Right lower leg: No edema.      Left lower leg: No edema.   Lymphadenopathy:      Cervical: No cervical adenopathy.   Skin:     General: Skin is warm and dry.      Findings: No erythema or rash.   Neurological:      Mental Status: She is alert and oriented to person, place, and time.      Gait: Gait is intact.   Psychiatric:         Mood and Affect: Mood and affect normal.         Cognition and Memory: Memory normal.         Judgment: Judgment normal.       ASSESSMENT & PLAN:     Routine general medical examination at [facility]    Essential hypertension  -     amLODIPine (NORVASC) 5 MG tablet; Take 1 tablet (5 mg total) by mouth once daily.  Dispense: 90 tablet; Refill: 4  -     losartan (COZAAR) 100 MG tablet; Take 1 tablet (100 mg total) by mouth once daily.  Dispense: 90 tablet; Refill: 4    Moderate to severe aortic stenosis  No clinical heart failure.   Would recommend at least annual echo.   Signs of CHF discussed, she will watch for this.            Jose Manuel Landrum MD   Internal Medicine

## 2023-07-13 ENCOUNTER — TELEPHONE (OUTPATIENT)
Dept: DERMATOLOGY | Facility: CLINIC | Age: 83
End: 2023-07-13
Payer: MEDICARE

## 2023-07-13 DIAGNOSIS — G89.29 CHRONIC PAIN OF RIGHT KNEE: Primary | ICD-10-CM

## 2023-07-13 DIAGNOSIS — M25.561 CHRONIC PAIN OF RIGHT KNEE: Primary | ICD-10-CM

## 2023-07-13 NOTE — TELEPHONE ENCOUNTER
----- Message from Galen Licea sent at 7/13/2023 11:57 AM CDT -----  Contact: self  Type: Sooner Appointment Request        Caller is requesting a sooner appointment. Caller declined first available appointment listed below. Caller will not accept being placed on the waitlist and is requesting a message be sent to doctor.        Name of Caller: Patient   Best Call Back Number: [phone]  Additional Information: Pt states she ha a bump on her face and wants to get in. Thanks

## 2023-07-17 ENCOUNTER — HOSPITAL ENCOUNTER (OUTPATIENT)
Dept: RADIOLOGY | Facility: HOSPITAL | Age: 83
Discharge: HOME | End: 2023-07-17
Attending: ORTHOPAEDIC SURGERY
Payer: MEDICARE

## 2023-07-17 ENCOUNTER — OFFICE VISIT (OUTPATIENT)
Dept: ORTHOPEDICS | Facility: CLINIC | Age: 83
End: 2023-07-17
Payer: MEDICARE

## 2023-07-17 VITALS — HEIGHT: 65 IN | BODY MASS INDEX: 23.32 KG/M2 | WEIGHT: 140 LBS

## 2023-07-17 DIAGNOSIS — M17.11 UNILATERAL PRIMARY OSTEOARTHRITIS, RIGHT KNEE: Primary | ICD-10-CM

## 2023-07-17 DIAGNOSIS — M25.561 CHRONIC PAIN OF RIGHT KNEE: ICD-10-CM

## 2023-07-17 DIAGNOSIS — G89.29 CHRONIC PAIN OF RIGHT KNEE: ICD-10-CM

## 2023-07-17 PROCEDURE — 1159F MED LIST DOCD IN RCRD: CPT | Mod: CPTII,S$GLB,, | Performed by: ORTHOPAEDIC SURGERY

## 2023-07-17 PROCEDURE — 20610 DRAIN/INJ JOINT/BURSA W/O US: CPT | Mod: RT,S$GLB,, | Performed by: ORTHOPAEDIC SURGERY

## 2023-07-17 PROCEDURE — 73562 X-RAY EXAM OF KNEE 3: CPT | Mod: 26,LT,, | Performed by: RADIOLOGY

## 2023-07-17 PROCEDURE — 73562 XR KNEE ORTHO RIGHT WITH FLEXION: ICD-10-PCS | Mod: 26,LT,, | Performed by: RADIOLOGY

## 2023-07-17 PROCEDURE — 1160F PR REVIEW ALL MEDS BY PRESCRIBER/CLIN PHARMACIST DOCUMENTED: ICD-10-PCS | Mod: CPTII,S$GLB,, | Performed by: ORTHOPAEDIC SURGERY

## 2023-07-17 PROCEDURE — 73564 XR KNEE ORTHO RIGHT WITH FLEXION: ICD-10-PCS | Mod: 26,RT,, | Performed by: RADIOLOGY

## 2023-07-17 PROCEDURE — 73564 X-RAY EXAM KNEE 4 OR MORE: CPT | Mod: 26,RT,, | Performed by: RADIOLOGY

## 2023-07-17 PROCEDURE — 1160F RVW MEDS BY RX/DR IN RCRD: CPT | Mod: CPTII,S$GLB,, | Performed by: ORTHOPAEDIC SURGERY

## 2023-07-17 PROCEDURE — 99999 PR PBB SHADOW E&M-EST. PATIENT-LVL III: CPT | Mod: PBBFAC,,, | Performed by: ORTHOPAEDIC SURGERY

## 2023-07-17 PROCEDURE — 99204 OFFICE O/P NEW MOD 45 MIN: CPT | Mod: 25,S$GLB,, | Performed by: ORTHOPAEDIC SURGERY

## 2023-07-17 PROCEDURE — 1101F PR PT FALLS ASSESS DOC 0-1 FALLS W/OUT INJ PAST YR: ICD-10-PCS | Mod: CPTII,S$GLB,, | Performed by: ORTHOPAEDIC SURGERY

## 2023-07-17 PROCEDURE — 99204 PR OFFICE/OUTPT VISIT, NEW, LEVL IV, 45-59 MIN: ICD-10-PCS | Mod: 25,S$GLB,, | Performed by: ORTHOPAEDIC SURGERY

## 2023-07-17 PROCEDURE — 3288F FALL RISK ASSESSMENT DOCD: CPT | Mod: CPTII,S$GLB,, | Performed by: ORTHOPAEDIC SURGERY

## 2023-07-17 PROCEDURE — 1159F PR MEDICATION LIST DOCUMENTED IN MEDICAL RECORD: ICD-10-PCS | Mod: CPTII,S$GLB,, | Performed by: ORTHOPAEDIC SURGERY

## 2023-07-17 PROCEDURE — 73564 X-RAY EXAM KNEE 4 OR MORE: CPT | Mod: TC,PO,RT

## 2023-07-17 PROCEDURE — 1101F PT FALLS ASSESS-DOCD LE1/YR: CPT | Mod: CPTII,S$GLB,, | Performed by: ORTHOPAEDIC SURGERY

## 2023-07-17 PROCEDURE — 20610 LARGE JOINT ASPIRATION/INJECTION: R KNEE: ICD-10-PCS | Mod: RT,S$GLB,, | Performed by: ORTHOPAEDIC SURGERY

## 2023-07-17 PROCEDURE — 99999 PR PBB SHADOW E&M-EST. PATIENT-LVL III: ICD-10-PCS | Mod: PBBFAC,,, | Performed by: ORTHOPAEDIC SURGERY

## 2023-07-17 PROCEDURE — 3288F PR FALLS RISK ASSESSMENT DOCUMENTED: ICD-10-PCS | Mod: CPTII,S$GLB,, | Performed by: ORTHOPAEDIC SURGERY

## 2023-07-17 RX ORDER — METHYLPREDNISOLONE ACETATE 80 MG/ML
80 INJECTION, SUSPENSION INTRA-ARTICULAR; INTRALESIONAL; INTRAMUSCULAR; SOFT TISSUE
Status: DISCONTINUED | OUTPATIENT
Start: 2023-07-17 | End: 2023-09-12

## 2023-07-17 RX ADMIN — METHYLPREDNISOLONE ACETATE 80 MG: 80 INJECTION, SUSPENSION INTRA-ARTICULAR; INTRALESIONAL; INTRAMUSCULAR; SOFT TISSUE at 09:07

## 2023-07-17 NOTE — PROGRESS NOTES
CC:  82-year-old female presents for evaluation of right knee pain.  The patient has had chronic pain in her right knee for many years.  She previously has seen another orthopedic surgeon and received injections which have worked quite well.  She states that she is had a recurrence of her knee pain over the last couple of weeks it has been about 3 years since she had an injection.  She currently rates her pain as a 6/10.    Past Medical History:   Diagnosis Date    Aortic valve stenosis 2019 [facility]- Dr Rivera    Asymptomatic - since age 21    Hypertension        Past Surgical History:   Procedure Laterality Date    BREAST BIOPSY      Benign, calcification    HYSTERECTOMY      TONSILLECTOMY         Current Outpatient Medications on File Prior to Visit   Medication Sig Dispense Refill    albuterol (PROVENTIL HFA) 90 mcg/actuation inhaler Inhale 2 puffs into the lungs every 6 (six) hours as needed (cough/ wheezing). Rescue (Patient not taking: Reported on 5/23/2023) 6.7 g 0    amLODIPine (NORVASC) 5 MG tablet Take 1 tablet (5 mg total) by mouth once daily. 90 tablet 4    BENEFIBER, GUAR GUM, ORAL Take by mouth.      calcium carbonate/vitamin D3 (CALTRATE-600 PLUS VITAMIN D3 ORAL) Take 1 tablet by mouth once daily.      clobetasol 0.05% (TEMOVATE) 0.05 % Oint APPLY TOPICALLY TO THE AFFECTED AREA DAILY AS NEEDED 45 g 0    docusate sodium (COLACE) 250 MG capsule Take 250 mg by mouth once daily.      levocetirizine (XYZAL) 5 MG tablet Take 1 tablet (5 mg total) by mouth every evening. 30 tablet 11    losartan (COZAAR) 100 MG tablet Take 1 tablet (100 mg total) by mouth once daily. 90 tablet 4    miconazole (MICOTIN) 2 % cream Apply topically 2 (two) times daily. 28 g 0    triamcinolone acetonide 0.1% (KENALOG) 0.1 % cream AAA bid 454 g 3     No current facility-administered medications on file prior to visit.       ROS:    Constitution: Denies chills, fever, and sweats.  HENT: Denies headaches or blurry  vision.  Cardiovascular: Denies chest pain or irregular heart beat.  Respiratory: Denies cough or shortness of breath.  Gastrointestinal: Denies abdominal pain, nausea, or vomiting.  Genitourinary:  Denies urinary incontinence, bladder and kidney issues  Musculoskeletal:  Denies muscle cramps.  Neurological: Denies dizziness or focal weakness.  Psychiatric/Behavioral: Normal mental status.  Hematologic/Lymphatic: Denies bleeding problem or easy bruising/bleeding.  Skin: Denies rash or suspicious lesions.    Physical examination     Gen - No acute distress, well nourished, well groomed   Eyes - Extraoccular motions intact, pupils equally round and reactive to light and accommodation   ENT - normocephalic, atruamtic, oropharynx clear   Neck - Supple, no abnormal masses   Cardiovascular - regular rate and rhythm   Pulmonary - clear to auscultation bilaterally, no wheezes, ronchi, or rales   Abdomen - soft, non-tender, non-distended, positive bowel sounds   Psych - The patient is alert and oriented x3 with normal mood and affect    Examination of the Right Lower Extremity:     Skin intact throughout.  Motor function is intact distally EHL/FHL/TA/brielle   +2 dorsalis pedis and posterior tibial pulses   Sensation to light touch intact distally dorsal, plantar, and first web space     Examination of the Right knee:    ROM 0 - 150   Effusion negative  Tenderness to palpation at the joint line positive  Pain during range of motion positive  Crepitation during range of motion positive     positive increased pain noted with flexion past 90   positive antalgic gait noted   negative Lachman's Test   negative Anterior Drawer Test   negative Posterior Drawer Test   negative McMurrays Test   negative Disco Test   negative Varus/Valgus instability    X-ray images were examined and personally interpreted by me.  Three views the right knee dated 07/17/2023 show mild arthritis with narrowing of the joint space    Dx:  Osteoarthritis  right knee    Plan:  I did offer the patient an injection and she agreed.  We injected the right knee with a mixture of 2, 2, 1.  She tolerated that well.  Follow up p.r.n.

## 2023-07-17 NOTE — PROCEDURES
Large Joint Aspiration/Injection: R knee    Date/Time: 7/17/2023 9:45 AM  Performed by: Yan Argueta II, MD  Authorized by: Yan Argueta II, MD     Consent Done?:  Yes (Verbal)  Indications:  Arthritis and pain  Timeout: prior to procedure the correct patient, procedure, and site was verified      Local anesthesia used?: Yes    Local anesthetic:  Topical anesthetic    Details:  Needle Size:  22 G  Approach:  Anteromedial  Location:  Knee  Site:  R knee  Medications:  80 mg methylPREDNISolone acetate 80 mg/mL  Patient tolerance:  Patient tolerated the procedure well with no immediate complications

## 2023-08-22 ENCOUNTER — TELEPHONE (OUTPATIENT)
Dept: FAMILY MEDICINE | Facility: CLINIC | Age: 83
End: 2023-08-22
Payer: MEDICARE

## 2023-08-22 NOTE — TELEPHONE ENCOUNTER
Pt calls back and states that she would like to cancel her appt with Mrs. Reynoso. She would only like to see   Dr. Landrum. I advised her that the first available is nov 21st. Added pt to the waiting list.     She also has concerns about her blood pressure being slightly elevated. She states that this has only happened one time. I advised her to call back if this happens again and we can book a bp check to ensure that her machine is correct. No further questions.

## 2023-08-22 NOTE — TELEPHONE ENCOUNTER
I spoke with pt via phone. She states that she is having a nervous stomach ( anxiety). She states that she is moving out of her house and just knowing that she has to get everything out worries her. Will see Mrs. Reynoso on 08/24/2023 . No further questions.     ----- Message from Ivelisse Pastrana sent at 8/22/2023  8:42 AM CDT -----  Regarding: Sooner Appointment Request  Contact: patient at [phone]  Type:  Sooner Appointment Request    Caller is requesting a sooner appointment.  Caller declined first available appointment listed below.  Caller will not accept being placed on the waitlist and is requesting a message be sent to doctor.    Name of Caller:  patient at [phone]    When is the first available appointment?  Nov. 21  Symptoms:  nervous stomach    Additional Information:  Please call and advise. Thank you

## 2023-08-29 ENCOUNTER — TELEPHONE (OUTPATIENT)
Dept: FAMILY MEDICINE | Facility: CLINIC | Age: 83
End: 2023-08-29

## 2023-08-29 ENCOUNTER — OFFICE VISIT (OUTPATIENT)
Dept: FAMILY MEDICINE | Facility: CLINIC | Age: 83
End: 2023-08-29
Payer: MEDICARE

## 2023-08-29 VITALS
HEIGHT: 65 IN | DIASTOLIC BLOOD PRESSURE: 82 MMHG | WEIGHT: 135.38 LBS | BODY MASS INDEX: 22.56 KG/M2 | SYSTOLIC BLOOD PRESSURE: 132 MMHG | TEMPERATURE: 99 F | OXYGEN SATURATION: 96 % | HEART RATE: 84 BPM

## 2023-08-29 DIAGNOSIS — F41.9 ANXIETY: Primary | ICD-10-CM

## 2023-08-29 DIAGNOSIS — I35.0 AORTIC VALVE STENOSIS, ETIOLOGY OF CARDIAC VALVE DISEASE UNSPECIFIED: Primary | ICD-10-CM

## 2023-08-29 DIAGNOSIS — R35.0 FREQUENCY OF URINATION: ICD-10-CM

## 2023-08-29 DIAGNOSIS — I35.0 SEVERE AORTIC STENOSIS: ICD-10-CM

## 2023-08-29 DIAGNOSIS — I10 ESSENTIAL HYPERTENSION: ICD-10-CM

## 2023-08-29 PROCEDURE — 1159F PR MEDICATION LIST DOCUMENTED IN MEDICAL RECORD: ICD-10-PCS | Mod: CPTII,S$GLB,, | Performed by: NURSE PRACTITIONER

## 2023-08-29 PROCEDURE — 87086 URINE CULTURE/COLONY COUNT: CPT | Performed by: NURSE PRACTITIONER

## 2023-08-29 PROCEDURE — 99999 PR PBB SHADOW E&M-EST. PATIENT-LVL V: ICD-10-PCS | Mod: PBBFAC,,, | Performed by: NURSE PRACTITIONER

## 2023-08-29 PROCEDURE — 1159F MED LIST DOCD IN RCRD: CPT | Mod: CPTII,S$GLB,, | Performed by: NURSE PRACTITIONER

## 2023-08-29 PROCEDURE — 1126F AMNT PAIN NOTED NONE PRSNT: CPT | Mod: CPTII,S$GLB,, | Performed by: NURSE PRACTITIONER

## 2023-08-29 PROCEDURE — 1126F PR PAIN SEVERITY QUANTIFIED, NO PAIN PRESENT: ICD-10-PCS | Mod: CPTII,S$GLB,, | Performed by: NURSE PRACTITIONER

## 2023-08-29 PROCEDURE — 99214 OFFICE O/P EST MOD 30 MIN: CPT | Mod: S$GLB,,, | Performed by: NURSE PRACTITIONER

## 2023-08-29 PROCEDURE — 1101F PT FALLS ASSESS-DOCD LE1/YR: CPT | Mod: CPTII,S$GLB,, | Performed by: NURSE PRACTITIONER

## 2023-08-29 PROCEDURE — 3079F DIAST BP 80-89 MM HG: CPT | Mod: CPTII,S$GLB,, | Performed by: NURSE PRACTITIONER

## 2023-08-29 PROCEDURE — 3288F PR FALLS RISK ASSESSMENT DOCUMENTED: ICD-10-PCS | Mod: CPTII,S$GLB,, | Performed by: NURSE PRACTITIONER

## 2023-08-29 PROCEDURE — 99214 PR OFFICE/OUTPT VISIT, EST, LEVL IV, 30-39 MIN: ICD-10-PCS | Mod: S$GLB,,, | Performed by: NURSE PRACTITIONER

## 2023-08-29 PROCEDURE — 99999 PR PBB SHADOW E&M-EST. PATIENT-LVL V: CPT | Mod: PBBFAC,,, | Performed by: NURSE PRACTITIONER

## 2023-08-29 PROCEDURE — 1160F PR REVIEW ALL MEDS BY PRESCRIBER/CLIN PHARMACIST DOCUMENTED: ICD-10-PCS | Mod: CPTII,S$GLB,, | Performed by: NURSE PRACTITIONER

## 2023-08-29 PROCEDURE — 3079F PR MOST RECENT DIASTOLIC BLOOD PRESSURE 80-89 MM HG: ICD-10-PCS | Mod: CPTII,S$GLB,, | Performed by: NURSE PRACTITIONER

## 2023-08-29 PROCEDURE — 1160F RVW MEDS BY RX/DR IN RCRD: CPT | Mod: CPTII,S$GLB,, | Performed by: NURSE PRACTITIONER

## 2023-08-29 PROCEDURE — 3075F SYST BP GE 130 - 139MM HG: CPT | Mod: CPTII,S$GLB,, | Performed by: NURSE PRACTITIONER

## 2023-08-29 PROCEDURE — 1101F PR PT FALLS ASSESS DOC 0-1 FALLS W/OUT INJ PAST YR: ICD-10-PCS | Mod: CPTII,S$GLB,, | Performed by: NURSE PRACTITIONER

## 2023-08-29 PROCEDURE — 3288F FALL RISK ASSESSMENT DOCD: CPT | Mod: CPTII,S$GLB,, | Performed by: NURSE PRACTITIONER

## 2023-08-29 PROCEDURE — 3075F PR MOST RECENT SYSTOLIC BLOOD PRESS GE 130-139MM HG: ICD-10-PCS | Mod: CPTII,S$GLB,, | Performed by: NURSE PRACTITIONER

## 2023-08-29 RX ORDER — SERTRALINE HYDROCHLORIDE 25 MG/1
25 TABLET, FILM COATED ORAL DAILY
Qty: 30 TABLET | Refills: 11 | Status: SHIPPED | OUTPATIENT
Start: 2023-08-29 | End: 2023-09-12

## 2023-08-29 RX ORDER — HYDROXYZINE HYDROCHLORIDE 25 MG/1
25 TABLET, FILM COATED ORAL 3 TIMES DAILY PRN
Qty: 30 TABLET | Refills: 0 | Status: SHIPPED | OUTPATIENT
Start: 2023-08-29 | End: 2023-09-12

## 2023-08-29 NOTE — PATIENT INSTRUCTIONS
"Dave Haskins,     If you are due for any health screening(s) below please notify me so we can arrange them to be ordered and scheduled to maintain your health. Most healthy patients complete it. Don't lose out on improving your health.     All of your core healthy metrics are met.                          Patient Education       Checking Your Blood Pressure at Home   The Basics   Written by the doctors and editors at [facility]   How is blood pressure measured? -- Blood pressure is usually measured with a device that goes around your upper arm. This is often done in a doctor's office. But some people also check their blood pressure themselves, at home or at work.  Blood pressure is explained with 2 numbers. For instance, your blood pressure might be "140 over 90." The first (top) number is the pressure inside your arteries when your heart is whitney. The second (bottom) number is the pressure inside your arteries when your heart is relaxed. The table shows how doctors and nurses define high and normal blood pressure (table 1).  If your blood pressure gets too high, it puts you at risk for heart attack, stroke, and kidney disease. High blood pressure does not usually cause symptoms. But it can be serious.  What is a home blood pressure meter? -- A home blood pressure meter (or "monitor") is a device you can use to check your blood pressure yourself. It has a cuff that goes around your upper arm (figure 1). Some devices have a cuff that goes around your wrist instead. But doctors aren't sure if these work as well. The meter also has a small screen, or dial, that shows your blood pressure numbers.  There are also special meters you can wear for a day or 2. These are different because they automatically check your blood pressure throughout the day and night, even while you are sleeping. If your doctor thinks you should use one of these devices, they will talk to you about how to wear it.  Why do I need to check my blood " pressure at home? -- If your doctor knows or suspects that you have high blood pressure, they might want you to check it at home. There are a few reasons for this. Your doctor might want to look at:  Whether your blood pressure measures the same at home as it did in the doctor's office  How well your blood pressure medicines are working  Changes in your blood pressure, for example, if it goes up and down  People who check their own blood pressure at home usually do better at keeping it low.  How do I choose a home blood pressure meter? -- When choosing a home blood pressure meter, you will probably want to think about:  Cost - Some devices cost more than others. You should also check to see if your insurance will help pay for your device.  Size - It's important to make sure the cuff fits your arm comfortably. Your doctor or nurse can help you with this.  How easy it is to use - You should make sure you understand how to use the device. You also need to be able to read the numbers on the screen.  You do not need a prescription to buy a home blood pressure meter. You can buy them at most pharmacies or over the internet. Your doctor or nurse can help you choose the right device for you.  How do I check my blood pressure at home? -- Once you have a home blood pressure meter, your doctor or nurse should check it to make sure it fits you and works correctly.  When it's time to check your blood pressure:  Go to the bathroom and empty your bladder first. Having a full bladder can temporarily increase your blood pressure, making the results inaccurate.  Sit in a chair with your feet flat on the ground.  Try to breathe normally and stay calm.  Attach the cuff to your arm. Place the cuff directly on your skin, not over your clothing. The cuff should be tight enough to not slip down, but not uncomfortably tight.  Sit and relax for about 3 to 5 minutes with the cuff on.  Follow the directions that came with your device to start  measuring your blood pressure. This might involve squeezing the bulb at the end of the tube to inflate the cuff (fill it with air). With some monitors, you just need to press a button to inflate the cuff. When the cuff fills with air, it feels like someone is squeezing your arm, but it should not hurt. Then you will slowly deflate the cuff (let the air out of it), or it will deflate by itself. The screen or dial will show your blood pressure numbers.  Stay seated and relax for 1 minute, then measure your blood pressure again.  How often should I check my blood pressure? -- It depends. Different people need to follow different schedules. Your doctor or nurse will tell you how often to check your blood pressure, and when. Some people need to check their blood pressure twice a day, in the morning and evening.  Your doctor or nurse will probably tell you to keep track of your blood pressure for at least a few days (table 2). Then they will look at the numbers. The reason for this is that it's normal for your blood pressure to change a bit from day to day. For example, the numbers might change depending on whether you recently had caffeine, just exercised, or feel stressed. Checking your blood pressure over several days - or longer - will give your doctor or nurse a better idea of what is average for you.  How should I keep track of my blood pressure? -- Some blood pressure meters will record your numbers for you, or send them to your computer or smartphone. If yours does not do this, you will need to write them down. Your doctor or nurse can help you figure out the best way to keep track of the numbers.  What if my blood pressure is high? -- Your doctor or nurse will tell you what to do if your blood pressure is high when you check it at home. If you get a number that is higher than normal, measure it again to see if it is still high. If it is very high (above a certain number, which your doctor or nurse will tell you  "to watch out for), you should call your doctor right away.  If your blood pressure is only a little high, your doctor or nurse might tell you to keep checking it for a few more days or weeks, and then call if it does not go back down. Then they can help you decide what to do next.  All topics are updated as new evidence becomes available and our peer review process is complete.  This topic retrieved from [employer] on: Sep 21, 2021.  Topic 441383 Version 4.0  Release: 29.4.2 - C29.263  © 2021 UpToDate, Inc. and/or its affiliates. All rights reserved.  table 1: Definition of normal and high blood pressure  Level  Top number  Bottom number    High 130 or above 80 or above   Elevated 120 to 129 79 or below   Normal 119 or below 79 or below   These definitions are from the American College of Cardiology/American Heart Association. Other expert groups might use slightly different definitions.  "Elevated blood pressure" is a term doctor or nurses use as a warning. It means you do not yet have high blood pressure, but your blood pressure is not as low as it should be for good health.  Graphic 85587 Version 6.0  figure 1: Using a home blood pressure meter     This is an example of a person using a home blood pressure meter.  Graphic 553023 Version 1.0    table 2: 7-day diary for checking blood pressure at home  Day 1  Day 2  Day 3  Day 4  Day 5  Day 6  Day 7    Morning  1st read Morning  1st read Morning  1st read Morning  1st read Morning  1st read Morning  1st read Morning  1st read   Systolic: __________ Systolic: __________ Systolic: __________ Systolic: __________ Systolic: __________ Systolic: __________ Systolic: __________   Diastolic: __________ Diastolic: __________ Diastolic: __________ Diastolic: __________ Diastolic: __________ Diastolic: __________ Diastolic: __________   Pulse: __________ Pulse: __________ Pulse: __________ Pulse: __________ Pulse: __________ Pulse: __________ Pulse: __________   Morning  2nd " read Morning  2nd read Morning  2nd read Morning  2nd read Morning  2nd read Morning  2nd read Morning  2nd read   Systolic: __________ Systolic: __________ Systolic: __________ Systolic: __________ Systolic: __________ Systolic: __________ Systolic: __________   Diastolic: __________ Diastolic: __________ Diastolic: __________ Diastolic: __________ Diastolic: __________ Diastolic: __________ Diastolic: __________   Pulse: __________ Pulse: __________ Pulse: __________ Pulse: __________ Pulse: __________ Pulse: __________ Pulse: __________   Evening  1st read Evening  1st read Evening  1st read Evening  1st read Evening  1st read Evening  1st read Evening  1st read   Systolic: __________ Systolic: __________ Systolic: __________ Systolic: __________ Systolic: __________ Systolic: __________ Systolic: __________   Diastolic: __________ Diastolic: __________ Diastolic: __________ Diastolic: __________ Diastolic: __________ Diastolic: __________ Diastolic: __________   Pulse: __________ Pulse: __________ Pulse: __________ Pulse: __________ Pulse: __________ Pulse: __________ Pulse: __________   Evening  2nd read Evening  2nd read Evening  2nd read Evening  2nd read Evening  2nd read Evening  2nd read Evening  2nd read   Systolic: __________ Systolic: __________ Systolic: __________ Systolic: __________ Systolic: __________ Systolic: __________ Systolic: __________   Diastolic: __________ Diastolic: __________ Diastolic: __________ Diastolic: __________ Diastolic: __________ Diastolic: __________ Diastolic: __________   Pulse: __________ Pulse: __________ Pulse: __________ Pulse: __________ Pulse: __________ Pulse: __________ Pulse: __________   Notes    Notes    Notes    Notes    Notes    Notes    Notes      ____________________ ____________________ ____________________ ____________________ ____________________ ____________________ ____________________   ____________________ ____________________ ____________________  ____________________ ____________________ ____________________ ____________________   ____________________ ____________________ ____________________ ____________________ ____________________ ____________________ ____________________   Patient name: ______________________________     Patient ID: ________________________________    Primary care provider: _______________________    Average BP: _______________________________    [facility] 048669 Version 1.0  Consumer Information Use and Disclaimer   This information is not specific medical advice and does not replace information you receive from your health care provider. This is only a brief summary of general information. It does NOT include all information about conditions, illnesses, injuries, tests, procedures, treatments, therapies, discharge instructions or life-style choices that may apply to you. You must talk with your health care provider for complete information about your health and treatment options. This information should not be used to decide whether or not to accept your health care provider's advice, instructions or recommendations. Only your health care provider has the knowledge and training to provide advice that is right for you. The use of this information is governed by the [employer] End User License Agreement, available at https://www.[employer].com/en/solutions/lexicomp/about/alice.The use of [employer] content is governed by the [employer] Terms of Use. ©2021 [employer] All rights reserved.  Copyright   © 2021 UpToDate, Inc. and/or its affiliates. All rights reserved.

## 2023-08-29 NOTE — TELEPHONE ENCOUNTER
Pt requesting referral to see cardiology as she states that you know about her condition. ( Heat murmur)   please advise. Thank you!    ----- Message from Laura Aggarwal sent at 8/29/2023  2:49 PM CDT -----  Regarding: Refferal  Type:  Patient Requesting Referral    Who Called:pt      Referral to What Specialty:cardio    Reason for Referral:wants a second opinion for her heart    Does the patient want the referral with a specific physician?:no she wants to know what Dr Landrum says      Patient Requesting a Response?:yes    Would the patient rather a call back or a response via [facility]? Call back    Best Call Back Number:[phone]      Additional Information:      Please call Back to advise. Thanks!
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
## 2023-08-30 ENCOUNTER — HOSPITAL ENCOUNTER (OUTPATIENT)
Facility: HOSPITAL | Age: 83
Discharge: HOME | End: 2023-08-31
Attending: EMERGENCY MEDICINE | Admitting: INTERNAL MEDICINE
Payer: MEDICARE

## 2023-08-30 ENCOUNTER — TELEPHONE (OUTPATIENT)
Dept: FAMILY MEDICINE | Facility: CLINIC | Age: 83
End: 2023-08-30
Payer: MEDICARE

## 2023-08-30 ENCOUNTER — CLINICAL SUPPORT (OUTPATIENT)
Dept: CARDIOLOGY | Facility: HOSPITAL | Age: 83
End: 2023-08-30
Attending: INTERNAL MEDICINE
Payer: MEDICARE

## 2023-08-30 VITALS — WEIGHT: 137 LBS | HEIGHT: 65 IN | BODY MASS INDEX: 22.82 KG/M2

## 2023-08-30 DIAGNOSIS — R11.0 NAUSEA: ICD-10-CM

## 2023-08-30 DIAGNOSIS — R07.9 CHEST PAIN: ICD-10-CM

## 2023-08-30 DIAGNOSIS — R06.02 SOB (SHORTNESS OF BREATH): ICD-10-CM

## 2023-08-30 DIAGNOSIS — R07.9 CHEST PAIN, UNSPECIFIED TYPE: ICD-10-CM

## 2023-08-30 DIAGNOSIS — R00.2 PALPITATION: ICD-10-CM

## 2023-08-30 DIAGNOSIS — R00.2 PALPITATIONS: Primary | ICD-10-CM

## 2023-08-30 PROBLEM — F41.9 ANXIETY: Chronic | Status: ACTIVE | Noted: 2023-08-30

## 2023-08-30 PROBLEM — I35.0 SEVERE AORTIC STENOSIS: Chronic | Status: ACTIVE | Noted: 2023-08-30

## 2023-08-30 PROBLEM — E87.6 HYPOKALEMIA: Status: ACTIVE | Noted: 2023-08-30

## 2023-08-30 PROBLEM — I49.1 PAC (PREMATURE ATRIAL CONTRACTION): Status: ACTIVE | Noted: 2023-08-30

## 2023-08-30 LAB
ALBUMIN SERPL BCP-MCNC: 3.9 G/DL (ref 3.5–5.2)
ALP SERPL-CCNC: 62 U/L (ref 55–135)
ALT SERPL W/O P-5'-P-CCNC: 18 U/L (ref 10–44)
ANION GAP SERPL CALC-SCNC: 8 MMOL/L (ref 8–16)
AORTIC ROOT ANNULUS: 2.5 CM
AORTIC VALVE CUSP SEPERATION: 0.9 CM
ASCENDING AORTA: 3.5 CM
AST SERPL-CCNC: 16 U/L (ref 10–40)
AV INDEX (PROSTH): 0.54
AV MEAN GRADIENT: 21 MMHG
AV PEAK GRADIENT: 40 MMHG
AV VALVE AREA BY VELOCITY RATIO: 1.73 CM²
AV VALVE AREA: 2.05 CM²
AV VELOCITY RATIO: 0.46
BASOPHILS # BLD AUTO: 0.04 K/UL (ref 0–0.2)
BASOPHILS NFR BLD: 0.7 % (ref 0–1.9)
BILIRUB SERPL-MCNC: 1 MG/DL (ref 0.1–1)
BNP SERPL-MCNC: 37 PG/ML (ref 0–99)
BSA FOR ECHO PROCEDURE: 1.69 M2
BUN SERPL-MCNC: 17 MG/DL (ref 8–23)
CALCIUM SERPL-MCNC: 9.2 MG/DL (ref 8.7–10.5)
CHLORIDE SERPL-SCNC: 109 MMOL/L (ref 95–110)
CO2 SERPL-SCNC: 22 MMOL/L (ref 23–29)
CREAT SERPL-MCNC: 0.8 MG/DL (ref 0.5–1.4)
CV ECHO LV RWT: 0.74 CM
DIFFERENTIAL METHOD: ABNORMAL
DOP CALC AO PEAK VEL: 3.18 M/S
DOP CALC AO VTI: 55.3 CM
DOP CALC LVOT AREA: 3.8 CM2
DOP CALC LVOT DIAMETER: 2.2 CM
DOP CALC LVOT PEAK VEL: 1.45 M/S
DOP CALC LVOT STROKE VOLUME: 113.6 CM3
DOP CALC MV VTI: 18.9 CM
DOP CALCLVOT PEAK VEL VTI: 29.9 CM
E WAVE DECELERATION TIME: 201 MSEC
E/A RATIO: 0.78
E/E' RATIO: 11 M/S
ECHO LV POSTERIOR WALL: 1.24 CM (ref 0.6–1.1)
EOSINOPHIL # BLD AUTO: 0.1 K/UL (ref 0–0.5)
EOSINOPHIL NFR BLD: 0.9 % (ref 0–8)
ERYTHROCYTE [DISTWIDTH] IN BLOOD BY AUTOMATED COUNT: 12.9 % (ref 11.5–14.5)
EST. GFR  (NO RACE VARIABLE): >60 ML/MIN/1.73 M^2
FRACTIONAL SHORTENING: 27 % (ref 28–44)
GLUCOSE SERPL-MCNC: 135 MG/DL (ref 70–110)
HCT VFR BLD AUTO: 41.1 % (ref 37–48.5)
HGB BLD-MCNC: 14.2 G/DL (ref 12–16)
IMM GRANULOCYTES # BLD AUTO: 0.01 K/UL (ref 0–0.04)
IMM GRANULOCYTES NFR BLD AUTO: 0.2 % (ref 0–0.5)
INTERVENTRICULAR SEPTUM: 1.24 CM (ref 0.6–1.1)
IVRT: 109 MSEC
LEFT ATRIUM VOLUME INDEX MOD: 34.4 ML/M2
LEFT ATRIUM VOLUME MOD: 57.8 CM3
LEFT INTERNAL DIMENSION IN SYSTOLE: 2.45 CM (ref 2.1–4)
LEFT VENTRICLE DIASTOLIC VOLUME INDEX: 27.02 ML/M2
LEFT VENTRICLE DIASTOLIC VOLUME: 45.4 ML
LEFT VENTRICLE MASS INDEX: 80 G/M2
LEFT VENTRICLE SYSTOLIC VOLUME INDEX: 12.6 ML/M2
LEFT VENTRICLE SYSTOLIC VOLUME: 21.2 ML
LEFT VENTRICULAR INTERNAL DIMENSION IN DIASTOLE: 3.34 CM (ref 3.5–6)
LEFT VENTRICULAR MASS: 133.62 G
LV LATERAL E/E' RATIO: 9.63 M/S
LV SEPTAL E/E' RATIO: 12.83 M/S
LVOT MG: 5 MMHG
LVOT MV: 1.07 CM/S
LYMPHOCYTES # BLD AUTO: 1.1 K/UL (ref 1–4.8)
LYMPHOCYTES NFR BLD: 19.4 % (ref 18–48)
MAGNESIUM SERPL-MCNC: 1.9 MG/DL (ref 1.6–2.6)
MCH RBC QN AUTO: 30.5 PG (ref 27–31)
MCHC RBC AUTO-ENTMCNC: 34.5 G/DL (ref 32–36)
MCV RBC AUTO: 88 FL (ref 82–98)
MONOCYTES # BLD AUTO: 0.3 K/UL (ref 0.3–1)
MONOCYTES NFR BLD: 5.5 % (ref 4–15)
MV MEAN GRADIENT: 3 MMHG
MV PEAK A VEL: 0.99 M/S
MV PEAK E VEL: 0.77 M/S
MV PEAK GRADIENT: 7 MMHG
MV STENOSIS PRESSURE HALF TIME: 68 MS
MV VALVE AREA BY CONTINUITY EQUATION: 6.01 CM2
MV VALVE AREA P 1/2 METHOD: 3.24 CM2
NEUTROPHILS # BLD AUTO: 4.2 K/UL (ref 1.8–7.7)
NEUTROPHILS NFR BLD: 73.3 % (ref 38–73)
NRBC BLD-RTO: 0 /100 WBC
OHS LV EJECTION FRACTION SIMPSONS BIPLANE MOD: 52 %
PISA TR MAX VEL: 2.05 M/S
PLATELET # BLD AUTO: 174 K/UL (ref 150–450)
PMV BLD AUTO: 10.4 FL (ref 9.2–12.9)
POTASSIUM SERPL-SCNC: 3.4 MMOL/L (ref 3.5–5.1)
PROT SERPL-MCNC: 6.7 G/DL (ref 6–8.4)
PV MV: 0.74 M/S
PV PEAK GRADIENT: 4 MMHG
PV PEAK VELOCITY: 1.03 M/S
RA MAJOR: 4.14 CM
RA PRESSURE ESTIMATED: 3 MMHG
RA WIDTH: 3.58 CM
RBC # BLD AUTO: 4.66 M/UL (ref 4–5.4)
RIGHT VENTRICULAR END-DIASTOLIC DIMENSION: 2.83 CM
RIGHT VENTRICULAR LENGTH IN DIASTOLE (APICAL 4-CHAMBER VIEW): 5.33 CM
RV TB RVSP: 5 MMHG
RV TISSUE DOPPLER FREE WALL SYSTOLIC VELOCITY 1 (APICAL 4 CHAMBER VIEW): 23.9 CM/S
SINUS: 2.62 CM
SODIUM SERPL-SCNC: 139 MMOL/L (ref 136–145)
STJ: 3.01 CM
TDI LATERAL: 0.08 M/S
TDI SEPTAL: 0.06 M/S
TDI: 0.07 M/S
TR MAX PG: 17 MMHG
TRICUSPID ANNULAR PLANE SYSTOLIC EXCURSION: 2.61 CM
TROPONIN I SERPL HS-MCNC: 15.2 PG/ML (ref 0–14.9)
TROPONIN I SERPL HS-MCNC: 17.2 PG/ML (ref 0–14.9)
TROPONIN I SERPL HS-MCNC: 6.8 PG/ML (ref 0–14.9)
TSH SERPL DL<=0.005 MIU/L-ACNC: 1.86 UIU/ML (ref 0.34–5.6)
TV REST PULMONARY ARTERY PRESSURE: 20 MMHG
WBC # BLD AUTO: 5.78 K/UL (ref 3.9–12.7)
Z-SCORE OF LEFT VENTRICULAR DIMENSION IN END DIASTOLE: -3.39
Z-SCORE OF LEFT VENTRICULAR DIMENSION IN END SYSTOLE: -1.34

## 2023-08-30 PROCEDURE — 84484 ASSAY OF TROPONIN QUANT: CPT | Mod: 91 | Performed by: INTERNAL MEDICINE

## 2023-08-30 PROCEDURE — 93306 ECHO (CUPID ONLY): ICD-10-PCS | Mod: 26,,, | Performed by: GENERAL PRACTICE

## 2023-08-30 PROCEDURE — 36415 COLL VENOUS BLD VENIPUNCTURE: CPT | Performed by: EMERGENCY MEDICINE

## 2023-08-30 PROCEDURE — 84484 ASSAY OF TROPONIN QUANT: CPT | Mod: 91 | Performed by: EMERGENCY MEDICINE

## 2023-08-30 PROCEDURE — 83735 ASSAY OF MAGNESIUM: CPT | Performed by: EMERGENCY MEDICINE

## 2023-08-30 PROCEDURE — 99213 OFFICE O/P EST LOW 20 MIN: CPT | Mod: ,,, | Performed by: INTERNAL MEDICINE

## 2023-08-30 PROCEDURE — 36415 COLL VENOUS BLD VENIPUNCTURE: CPT | Performed by: INTERNAL MEDICINE

## 2023-08-30 PROCEDURE — 93005 ELECTROCARDIOGRAM TRACING: CPT | Performed by: SPECIALIST

## 2023-08-30 PROCEDURE — 84443 ASSAY THYROID STIM HORMONE: CPT | Performed by: EMERGENCY MEDICINE

## 2023-08-30 PROCEDURE — 85025 COMPLETE CBC W/AUTO DIFF WBC: CPT | Performed by: EMERGENCY MEDICINE

## 2023-08-30 PROCEDURE — 93010 ELECTROCARDIOGRAM REPORT: CPT | Mod: ,,, | Performed by: SPECIALIST

## 2023-08-30 PROCEDURE — 99213 PR OFFICE/OUTPT VISIT, EST, LEVL III, 20-29 MIN: ICD-10-PCS | Mod: ,,, | Performed by: INTERNAL MEDICINE

## 2023-08-30 PROCEDURE — 63600175 PHARM REV CODE 636 W HCPCS: Performed by: INTERNAL MEDICINE

## 2023-08-30 PROCEDURE — G0378 HOSPITAL OBSERVATION PER HR: HCPCS

## 2023-08-30 PROCEDURE — 80053 COMPREHEN METABOLIC PANEL: CPT | Performed by: EMERGENCY MEDICINE

## 2023-08-30 PROCEDURE — 25000003 PHARM REV CODE 250: Performed by: EMERGENCY MEDICINE

## 2023-08-30 PROCEDURE — 96365 THER/PROPH/DIAG IV INF INIT: CPT | Mod: 59

## 2023-08-30 PROCEDURE — 93010 EKG 12-LEAD: ICD-10-PCS | Mod: ,,, | Performed by: SPECIALIST

## 2023-08-30 PROCEDURE — 25000003 PHARM REV CODE 250: Performed by: INTERNAL MEDICINE

## 2023-08-30 PROCEDURE — 96372 THER/PROPH/DIAG INJ SC/IM: CPT | Mod: 59 | Performed by: INTERNAL MEDICINE

## 2023-08-30 PROCEDURE — 83880 ASSAY OF NATRIURETIC PEPTIDE: CPT | Performed by: EMERGENCY MEDICINE

## 2023-08-30 PROCEDURE — 93306 TTE W/DOPPLER COMPLETE: CPT | Mod: 26,,, | Performed by: GENERAL PRACTICE

## 2023-08-30 PROCEDURE — 93306 TTE W/DOPPLER COMPLETE: CPT

## 2023-08-30 PROCEDURE — 99285 EMERGENCY DEPT VISIT HI MDM: CPT | Mod: 25

## 2023-08-30 RX ORDER — IBUPROFEN 200 MG
16 TABLET ORAL
Status: DISCONTINUED | OUTPATIENT
Start: 2023-08-30 | End: 2023-08-31 | Stop reason: HOSPADM

## 2023-08-30 RX ORDER — IBUPROFEN 200 MG
24 TABLET ORAL
Status: DISCONTINUED | OUTPATIENT
Start: 2023-08-30 | End: 2023-08-31 | Stop reason: HOSPADM

## 2023-08-30 RX ORDER — ONDANSETRON 2 MG/ML
4 INJECTION INTRAMUSCULAR; INTRAVENOUS EVERY 8 HOURS PRN
Status: DISCONTINUED | OUTPATIENT
Start: 2023-08-30 | End: 2023-08-31 | Stop reason: HOSPADM

## 2023-08-30 RX ORDER — SODIUM,POTASSIUM PHOSPHATES 280-250MG
2 POWDER IN PACKET (EA) ORAL
Status: DISCONTINUED | OUTPATIENT
Start: 2023-08-30 | End: 2023-08-31 | Stop reason: HOSPADM

## 2023-08-30 RX ORDER — POTASSIUM CHLORIDE 20 MEQ/1
40 TABLET, EXTENDED RELEASE ORAL ONCE
Status: COMPLETED | OUTPATIENT
Start: 2023-08-30 | End: 2023-08-30

## 2023-08-30 RX ORDER — NALOXONE HCL 0.4 MG/ML
0.02 VIAL (ML) INJECTION
Status: DISCONTINUED | OUTPATIENT
Start: 2023-08-30 | End: 2023-08-31 | Stop reason: HOSPADM

## 2023-08-30 RX ORDER — MAGNESIUM SULFATE 1 G/100ML
1 INJECTION INTRAVENOUS ONCE
Status: COMPLETED | OUTPATIENT
Start: 2023-08-30 | End: 2023-08-30

## 2023-08-30 RX ORDER — LANOLIN ALCOHOL/MO/W.PET/CERES
800 CREAM (GRAM) TOPICAL
Status: DISCONTINUED | OUTPATIENT
Start: 2023-08-30 | End: 2023-08-31 | Stop reason: HOSPADM

## 2023-08-30 RX ORDER — ASPIRIN 325 MG
325 TABLET ORAL
Status: COMPLETED | OUTPATIENT
Start: 2023-08-30 | End: 2023-08-30

## 2023-08-30 RX ORDER — GLUCAGON 1 MG
1 KIT INJECTION
Status: DISCONTINUED | OUTPATIENT
Start: 2023-08-30 | End: 2023-08-31 | Stop reason: HOSPADM

## 2023-08-30 RX ORDER — WHEAT DEXTRIN 5 G/7.4 G
1 POWDER (GRAM) ORAL DAILY
Status: ON HOLD | COMMUNITY
End: 2023-08-31 | Stop reason: HOSPADM

## 2023-08-30 RX ORDER — ENOXAPARIN SODIUM 100 MG/ML
40 INJECTION SUBCUTANEOUS EVERY 24 HOURS
Status: DISCONTINUED | OUTPATIENT
Start: 2023-08-30 | End: 2023-08-31 | Stop reason: HOSPADM

## 2023-08-30 RX ORDER — SODIUM CHLORIDE 0.9 % (FLUSH) 0.9 %
10 SYRINGE (ML) INJECTION EVERY 6 HOURS PRN
Status: DISCONTINUED | OUTPATIENT
Start: 2023-08-30 | End: 2023-08-31 | Stop reason: HOSPADM

## 2023-08-30 RX ORDER — ACETAMINOPHEN 325 MG/1
650 TABLET ORAL EVERY 4 HOURS PRN
Status: DISCONTINUED | OUTPATIENT
Start: 2023-08-30 | End: 2023-08-31 | Stop reason: HOSPADM

## 2023-08-30 RX ORDER — NAPROXEN SODIUM 220 MG/1
81 TABLET, FILM COATED ORAL DAILY
Status: DISCONTINUED | OUTPATIENT
Start: 2023-08-31 | End: 2023-08-31 | Stop reason: HOSPADM

## 2023-08-30 RX ORDER — LOSARTAN POTASSIUM 50 MG/1
100 TABLET ORAL DAILY
Status: DISCONTINUED | OUTPATIENT
Start: 2023-08-30 | End: 2023-08-31 | Stop reason: HOSPADM

## 2023-08-30 RX ORDER — SERTRALINE HYDROCHLORIDE 25 MG/1
25 TABLET, FILM COATED ORAL DAILY
Status: DISCONTINUED | OUTPATIENT
Start: 2023-08-30 | End: 2023-08-31 | Stop reason: HOSPADM

## 2023-08-30 RX ADMIN — SERTRALINE HYDROCHLORIDE 25 MG: 25 TABLET ORAL at 09:08

## 2023-08-30 RX ADMIN — POTASSIUM CHLORIDE 40 MEQ: 1500 TABLET, EXTENDED RELEASE ORAL at 08:08

## 2023-08-30 RX ADMIN — ASPIRIN 325 MG ORAL TABLET 325 MG: 325 PILL ORAL at 05:08

## 2023-08-30 RX ADMIN — LOSARTAN POTASSIUM 100 MG: 50 TABLET, FILM COATED ORAL at 09:08

## 2023-08-30 RX ADMIN — MAGNESIUM SULFATE HEPTAHYDRATE 1 G: 1 INJECTION, SOLUTION INTRAVENOUS at 08:08

## 2023-08-30 RX ADMIN — ENOXAPARIN SODIUM 40 MG: 40 INJECTION SUBCUTANEOUS at 05:08

## 2023-08-30 NOTE — H&P
[facility] - Emergency Dept  Hospital Medicine  History & Physical    Patient Name: Divine Ray  MRN: [id number]  Patient Class: OP- Observation  Admission Date: 8/30/2023  Attending Physician: Jacque Barker MD   Primary Care Provider: Jose Manuel Landrum MD         Patient information was obtained from patient, past medical records and ER records.     Subjective:     Principal Problem:Chest pain    Chief Complaint:   Chief Complaint   Patient presents with    Chest Pain     Left sided    Palpitations        HPI: Ms. Ray is an 82-year-old female who presented to the ED with chest tightness associated with palpitations.  Patient reports she awoke earlier this morning, pounding heartbeat, palpitation associated with chest tightness.  She reports intermittent episode of chest tightness over the last 2 weeks, felt like anxiety attack, lasting 1-2 hours, associated with shortness of breath.  She has known history of severe aortic stenosis, followed by cardiologist Dr. Rivera, states last seen last year and discussion about TAVR but she declined at that time.  States she has had a tight valve since her 20s.  In the ED blood pressure elevated, afebrile, on room air.  Telemetry with frequent PACs/PVCs.  Labs with potassium 3.4, creatinine 0.8, troponin 6.8, BNP 37, EKG reviewed, chest x-ray no focal infiltrates or consolidation.  She received aspirin 325 mg in ED. Case discussed with ED provider who requested admission.  Plan of care discussed with patient, no visitors at bedside.      Past Medical History:   Diagnosis Date    Aortic valve stenosis 2019 TRISTIN- Dr Rivera    Asymptomatic - since age 21    Hypertension        Past Surgical History:   Procedure Laterality Date    BREAST BIOPSY      Benign, calcification    HYSTERECTOMY      TONSILLECTOMY         Review of patient's allergies indicates:   Allergen Reactions    Anesthesia s/i-40 (propofol) [propofol] Nausea And Vomiting       Current  Facility-Administered Medications on File Prior to Encounter   Medication    methylPREDNISolone acetate injection 80 mg     Current Outpatient Medications on File Prior to Encounter   Medication Sig    amLODIPine (NORVASC) 5 MG tablet Take 1 tablet (5 mg total) by mouth once daily.    calcium carbonate/vitamin D3 (CALTRATE-600 PLUS VITAMIN D3 ORAL) Take 1 tablet by mouth once daily.    docusate sodium (COLACE) 250 MG capsule Take 250 mg by mouth once daily.    losartan (COZAAR) 100 MG tablet Take 1 tablet (100 mg total) by mouth once daily.    sertraline (ZOLOFT) 25 MG tablet Take 1 tablet (25 mg total) by mouth once daily.    wheat dextrin (BENEFIBER HEALTHY SHAPE) 5 gram/7.4 gram Powd Take 1 packet by mouth Daily.    clobetasol 0.05% (TEMOVATE) 0.05 % Oint APPLY TOPICALLY TO THE AFFECTED AREA DAILY AS NEEDED    hydrOXYzine HCL (ATARAX) 25 MG tablet Take 1 tablet (25 mg total) by mouth 3 (three) times daily as needed for Itching.    levocetirizine (XYZAL) 5 MG tablet Take 1 tablet (5 mg total) by mouth every evening.    triamcinolone acetonide 0.1% (KENALOG) 0.1 % cream AAA bid    [DISCONTINUED] albuterol (PROVENTIL HFA) 90 mcg/actuation inhaler Inhale 2 puffs into the lungs every 6 (six) hours as needed (cough/ wheezing). Rescue    [DISCONTINUED] BENEFIBER, GUAR GUM, ORAL Take by mouth.    [DISCONTINUED] miconazole (MICOTIN) 2 % cream Apply topically 2 (two) times daily.     Family History       Problem Relation (Age of Onset)    Heart disease Mother    Hypertension Father          Tobacco Use    Smoking status: Never    Smokeless tobacco: Never   Substance and Sexual Activity    Alcohol use: Not Currently    Drug use: Never    Sexual activity: Not Currently     Review of Systems   Constitutional:  Negative for chills and fever.   HENT:  Negative for congestion.    Respiratory:  Positive for shortness of breath.    Cardiovascular:  Positive for chest pain and palpitations.   Gastrointestinal:   Negative for abdominal pain, constipation, diarrhea, nausea and vomiting.   Genitourinary:  Negative for dysuria and frequency.   Neurological:  Negative for seizures.   Psychiatric/Behavioral:  Negative for confusion. The patient is nervous/anxious.      Objective:     Vital Signs (Most Recent):  Temp: 97.9 °F (36.6 °C) (08/30/23 0447)  Pulse: 69 (08/30/23 0732)  Resp: 18 (08/30/23 0732)  BP: (!) 159/70 (08/30/23 0732)  SpO2: 97 % (08/30/23 0732) Vital Signs (24h Range):  Temp:  [97.9 °F (36.6 °C)-98.6 °F (37 °C)] 97.9 °F (36.6 °C)  Pulse:  [] 69  Resp:  [16-18] 18  SpO2:  [96 %-97 %] 97 %  BP: (132-162)/(70-87) 159/70     Weight: 62.1 kg (137 lb)  Body mass index is 22.8 kg/m².     Physical Exam  Vitals and nursing note reviewed.   Constitutional:       General: She is not in acute distress.     Appearance: Normal appearance. She is normal weight. She is not ill-appearing or toxic-appearing.      Comments: Sitting in stretcher, NAD   HENT:      Head: Normocephalic and atraumatic.      Mouth/Throat:      Mouth: Mucous membranes are moist.   Eyes:      General:         Right eye: No discharge.         Left eye: No discharge.   Cardiovascular:      Rate and Rhythm: Normal rate.      Heart sounds: Murmur heard.      Comments: +frequent additional beats, no LE edema  Pulmonary:      Effort: No respiratory distress.      Breath sounds: No stridor. No wheezing.      Comments: On RA  Abdominal:      General: Bowel sounds are normal. There is no distension.      Palpations: Abdomen is soft.      Tenderness: There is no abdominal tenderness. There is no guarding.   Genitourinary:     Comments: No suprapubic fullness or tenderness  Skin:     General: Skin is warm.   Neurological:      General: No focal deficit present.      Mental Status: She is alert and oriented to person, place, and time. Mental status is at baseline.   Psychiatric:         Mood and Affect: Mood normal.                Significant Labs: BMP:   Recent  "Labs   Lab 08/30/23  0504   *      K 3.4*      CO2 22*   BUN 17   CREATININE 0.8   CALCIUM 9.2   MG 1.9     CBC:   Recent Labs   Lab 08/30/23  0504   WBC 5.78   HGB 14.2   HCT 41.1        CMP:   Recent Labs   Lab 08/30/23  0504      K 3.4*      CO2 22*   *   BUN 17   CREATININE 0.8   CALCIUM 9.2   PROT 6.7   ALBUMIN 3.9   BILITOT 1.0   ALKPHOS 62   AST 16   ALT 18   ANIONGAP 8     Cardiac Markers:   Recent Labs   Lab 08/30/23  0504   BNP 37     Lactic Acid: No results for input(s): "LACTATE" in the last 48 hours.  Magnesium:   Recent Labs   Lab 08/30/23  0504   MG 1.9     POCT Glucose: No results for input(s): "POCTGLUCOSE" in the last 48 hours.  Troponin:   Recent Labs   Lab 08/30/23  0504 08/30/23  0658   TROPONINIHS 6.8 17.2*     TSH: No results for input(s): "TSH" in the last 4320 hours.  Urine Culture: No results for input(s): "LABURIN" in the last 48 hours.  Urine Studies: No results for input(s): "COLORU", "APPEARANCEUA", "PHUR", "SPECGRAV", "PROTEINUA", "GLUCUA", "KETONESU", "BILIRUBINUA", "OCCULTUA", "NITRITE", "UROBILINOGEN", "LEUKOCYTESUR", "RBCUA", "WBCUA", "BACTERIA", "SQUAMEPITHEL", "HYALINECASTS" in the last 48 hours.    Invalid input(s): "WRIGHTSUR"    Significant Imaging: I have reviewed all pertinent imaging results/findings within the past 24 hours.    X-Ray Chest AP Portable    Result Date: 8/30/2023  Portable AP chest, 8/30/2023 5:26 AM CDT HISTORY:  82 years old ( 1940 ) Female patient ,  Chest Pain . COMPARISON: No prior studies available for comparison. FINDINGS: The lungs are normally inflated and overall are clear. There is no pulmonary mass, edema, pneumothorax, infiltrate, or effusion. The heart is normal in size. There is no evidence of pulmonary venous congestion. The hilar and mediastinal regions are considered within normal limits. Trachea is midline. The bones are intact with no bony lesions identified. IMPRESSION: 1.  No acute " findings. Electronically signed by:  Jose Santoro MD  8/30/2023 6:41 AM CDT Workstation: [id number]     Echocardiogram  Summary     Normal central venous pressure (3 mmHg).   The estimated PA systolic pressure is 25 mmHg.   Moderate concentric hypertrophy and normal systolic function.   The estimated ejection fraction is 65%.   Grade I left ventricular diastolic dysfunction.   Normal right ventricular size with normal right ventricular systolic function.   There is severe aortic valve stenosis.   Aortic valve area is 1.18 cm2; peak velocity is 4.0 m/s; mean gradient is 31 mmHg.   Mild mitral regurgitation.   Mild tricuspid regurgitation.    Assessment/Plan:     Active Hospital Problems    Diagnosis    *Chest pain with palpitations    Hypokalemia    Frequent PAC/PVC    Anxiety    Severe aortic stenosis    Essential hypertension     Plan:  Admit observation, continuous cardiac telemetry monitoring  Trending high sensitivity troponin   Add on TSH level   Previous echo 2022 with EF of 65% with grade 1 diastolic dysfunction with severe aortic stenosis, repeat echo ordered   40 mEq oral potassium supplementation, 1 g IV magnesium supplementation   Electrolytes sliding scale repletion  A.m. labs ordered  Cardiology consulted   Further plan as per hospital course    VTE Risk Mitigation (From admission, onward)         Ordered     enoxaparin injection 40 mg  Daily         08/30/23 0824     IP VTE HIGH RISK PATIENT  Once         08/30/23 0824     Place sequential compression device  Until discontinued         08/30/23 0824                   On 08/30/2023, patient should be placed in hospital observation services under my care.        Jacque Barker MD  Department of Hospital Medicine  [facility] - Emergency Dept

## 2023-08-30 NOTE — TELEPHONE ENCOUNTER
Call placed to patient who states she is currently in the ER.  States a cardiologist came in to see her a moment ago, but she was having difficulty understanding the cardiologist. Patient states she sent a request earlier today requesting a cardiology referral. Writer notes cardiology referral placed today by Dr. Landrum. Advised patient to contact the office once she has been discharged home, and office will assist her with scheduling with cardiology. This information has been shared with in office SHAHAB Arias for continuity of care.

## 2023-08-30 NOTE — ED NOTES
Responded to call light.   Pt ambulatory to BR to void.  Denies c/p or sob.  Steady ambulatory gait noted.

## 2023-08-30 NOTE — ED PROVIDER NOTES
Encounter Date: 8/30/2023       History     Chief Complaint   Patient presents with    Chest Pain     Left sided    Palpitations     Emergent evaluation of an 82-year-old female with history of hypertension on Norvasc 5 mg and Cozaar 100 mg just placed on sertraline due to anxiety and with moderate to severe aortic stenosis presents to the ER due to chest pain described as a heaviness, shortness of breath, nausea, and palpitations patient reports she was woke from sleep at 2:30 a.m. feeling an elevated heart rate then she reports she started to feel palpitations with skipped beats and she could hear are beat in her ears.  There was intermittent shortness of breath during this time.  She reports she is seen Dr. Rivera in the past he reported she needed the aortic valve to be fixed.  She would not yet followed up began she last saw on the proximally 1 year ago.  She reports no weakness dizziness or lightheadedness.      Review of patient's allergies indicates:   Allergen Reactions    Anesthesia s/i-40 (propofol) [propofol] Nausea And Vomiting     Past Medical History:   Diagnosis Date    Aortic valve stenosis 2019 TRISTIN- Dr Rivera    Asymptomatic - since age 21    Hypertension      Past Surgical History:   Procedure Laterality Date    BREAST BIOPSY      Benign, calcification    HYSTERECTOMY      TONSILLECTOMY       Family History   Problem Relation Age of Onset    Heart disease Mother     Hypertension Father     Psoriasis Neg Hx     Lupus Neg Hx     Eczema Neg Hx     Melanoma Neg Hx      Social History     Tobacco Use    Smoking status: Never    Smokeless tobacco: Never   Substance Use Topics    Alcohol use: Not Currently    Drug use: Never     Review of Systems   Constitutional:  Negative for activity change, appetite change, chills, diaphoresis, fatigue and fever.   HENT:  Negative for congestion, postnasal drip, rhinorrhea and sore throat.    Respiratory:  Positive for shortness of breath. Negative for cough, chest  tightness, wheezing and stridor.    Cardiovascular:  Positive for chest pain and palpitations.   Gastrointestinal:  Positive for nausea. Negative for abdominal distention, abdominal pain, blood in stool, constipation, diarrhea and vomiting.   Genitourinary:  Negative for dysuria, flank pain, frequency, hematuria and urgency.   Musculoskeletal:  Negative for arthralgias, back pain, myalgias, neck pain and neck stiffness.   Skin:  Negative for rash.   Neurological:  Negative for dizziness, syncope, weakness, light-headedness and headaches.   Hematological:  Does not bruise/bleed easily.   Psychiatric/Behavioral:  Negative for confusion. The patient is not nervous/anxious.    All other systems reviewed and are negative.      Physical Exam     Initial Vitals [08/30/23 0447]   BP Pulse Resp Temp SpO2   (!) 149/87 100 16 97.9 °F (36.6 °C) 97 %      MAP       --         Physical Exam    Nursing note and vitals reviewed.  Constitutional: She appears well-developed and well-nourished. She is not diaphoretic. No distress.   HENT:   Head: Normocephalic and atraumatic.   Right Ear: External ear normal.   Left Ear: External ear normal.   Nose: Nose normal.   Mouth/Throat: Oropharynx is clear and moist.   Eyes: Conjunctivae and EOM are normal. Pupils are equal, round, and reactive to light.   Neck: Neck supple. No tracheal deviation present.   Normal range of motion.  Cardiovascular:  Normal rate, normal heart sounds and intact distal pulses.     Exam reveals no gallop and no friction rub.       No murmur heard.  Irregular rhythm with frequent PACs and occasional PVCs on the monitor   Pulmonary/Chest: Breath sounds normal. No stridor. No respiratory distress. She has no wheezes. She has no rhonchi. She has no rales. She exhibits no tenderness.   Abdominal: Abdomen is soft. Bowel sounds are normal. She exhibits no distension and no mass. There is no abdominal tenderness. There is no rebound and no guarding.   Musculoskeletal:          General: No edema. Normal range of motion.      Cervical back: Normal range of motion and neck supple.     Neurological: She is alert and oriented to person, place, and time. She has normal strength. No cranial nerve deficit or sensory deficit.   Skin: Skin is warm and dry. No rash noted. No erythema. No pallor.   Psychiatric: She has a normal mood and affect. Her behavior is normal. Judgment and thought content normal.         ED Course   Procedures  Labs Reviewed   CBC W/ AUTO DIFFERENTIAL - Abnormal; Notable for the following components:       Result Value    Gran % 73.3 (*)     All other components within normal limits   COMPREHENSIVE METABOLIC PANEL - Abnormal; Notable for the following components:    Potassium 3.4 (*)     CO2 22 (*)     Glucose 135 (*)     All other components within normal limits   MAGNESIUM   TROPONIN I HIGH SENSITIVITY   B-TYPE NATRIURETIC PEPTIDE   TSH   TROPONIN I HIGH SENSITIVITY     EKG Readings: (Independently Interpreted)   Initial Reading: No STEMI. Rhythm: Normal Sinus Rhythm. Heart Rate: 86. Ectopy: No Ectopy. Conduction: Normal.     ECG Results              EKG 12-lead (In process)  Result time 08/30/23 05:03:54      In process by Interface, Lab In [facility] (08/30/23 05:03:54)                   Narrative:    Test Reason : R07.9,    Vent. Rate : 086 BPM     Atrial Rate : 086 BPM     P-R Int : 150 ms          QRS Dur : 086 ms      QT Int : 406 ms       P-R-T Axes : 063 025 055 degrees     QTc Int : 485 ms    Normal sinus rhythm  Possible Left atrial enlargement  Nonspecific ST abnormality  Abnormal ECG  No previous ECGs available    Referred By:             Confirmed By:                                   Imaging Results              X-Ray Chest AP Portable (Final result)  Result time 08/30/23 06:41:55      Final result by Jose Santoro MD (08/30/23 06:41:55)                   Narrative:    Portable AP chest, 8/30/2023 5:26 AM CDT    HISTORY:  82 years old ( 1940 )  Female patient ,  Chest Pain .    COMPARISON: No prior studies available for comparison.    FINDINGS:    The lungs are normally inflated and overall are clear. There is no pulmonary mass, edema, pneumothorax, infiltrate, or effusion.    The heart is normal in size. There is no evidence of pulmonary venous congestion.    The hilar and mediastinal regions are considered within normal limits. Trachea is midline.    The bones are intact with no bony lesions identified.    IMPRESSION:    1.  No acute findings.    Electronically signed by:  Jose Santoro MD  8/30/2023 6:41 AM CDT Workstation: [id number]                                  X-Rays:   Independently Interpreted Readings:   Chest X-Ray: Normal heart size.  No infiltrates.  No acute abnormalities.     Medications   potassium chloride SA CR tablet 40 mEq (has no administration in time range)   magnesium sulfate in dextrose IVPB (premix) 1 g (has no administration in time range)   aspirin tablet 325 mg (325 mg Oral Given 8/30/23 0504)     Medical Decision Making  Emergent evaluation of an 82-year-old female with history of hypertension on Norvasc 5 mg and Cozaar 100 mg just placed on sertraline due to anxiety and with moderate to severe aortic stenosis presents to the ER due to chest pain described as a heaviness, shortness of breath, nausea, and palpitations patient reports she was woke from sleep at 2:30 a.m. feeling an elevated heart rate then she reports she started to feel palpitations with skipped beats and she could hear are beat in her ears.  There was intermittent shortness of breath during this time.  She reports she is seen Dr. Rivera in the past he reported she needed the aortic valve to be fixed.  She would not yet followed up began she last saw on the proximally 1 year ago.  She reports no weakness dizziness or lightheadedness.  On physical exam patient had a blood pressure 149/87 pulse 100 respirations 16 sats 97% on room air temperature 97.9°.   Clear breath sounds bilaterally 3/6 aortic stenosis murmur.  Soft nontender abdomen.  No peripheral  Edema.MDM    Patient presents for emergent evaluation of acute palpitations, chest heaviness shortness of breath and nausea since 2:30 a.m. that poses a possible threat to life and/or bodily function.    Differential diagnosis includes but is not limited to ACS including unstable angina MI, cardiac dysrhythmias, worsening aortic stenosis with heart failure PE, pulmonary edema, pleural effusion pericardial effusion tamponade  In the ED patient found to have acute palpitations with chest pain shortness of breath.   I ordered labs and personally reviewed them.  Labs significant for see below   I ordered X-rays and personally reviewed them and reviewed the radiologist interpretation.  Xray significant for see below.    I ordered EKG and personally reviewed it.  EKG significant for see below      Admission [facility]  I discussed the patient presentation labs, ekg, X-rays, CT findings with the Hospitalist  Patient was managed in the ED with aspirin 325 mg orally    The response to treatment was good.    Patient required emergent consultation to hospitalist for admission.  Bharati Marshall M.D.       Amount and/or Complexity of Data Reviewed  Labs: ordered.     Details: Mag 1.9  Normal CBC CMP with a potassium of 3.4 bicarb 22 glucose 135  Normal troponin is 6.8 BNP 37  Radiology: ordered and independent interpretation performed.  ECG/medicine tests: ordered and independent interpretation performed.    Risk  OTC drugs.  Decision regarding hospitalization.                               Clinical Impression:   Final diagnoses:  [R07.9] Chest pain  [R00.2] Palpitations (Primary)  [R06.02] SOB (shortness of breath)  [R11.0] Nausea        ED Disposition Condition    Observation                 Bharati Marshall MD  08/30/23 0556

## 2023-08-30 NOTE — HPI
Ms. Ray is an 82-year-old female who presented to the ED with chest tightness associated with palpitations.  Patient reports she awoke earlier this morning, pounding heartbeat, palpitation associated with chest tightness.  She reports intermittent episode of chest tightness over the last 2 weeks, felt like anxiety attack, lasting 1-2 hours, associated with shortness of breath.  She has known history of severe aortic stenosis, followed by cardiologist Dr. Rivera, states last seen last year and discussion about TAVR but she declined at that time.  States she has had a tight valve since her 20s.  In the ED blood pressure elevated, afebrile, on room air.  Telemetry with frequent PACs/PVCs.  Labs with potassium 3.4, creatinine 0.8, troponin 6.8, BNP 37, EKG reviewed, chest x-ray no focal infiltrates or consolidation.  She received aspirin 325 mg in ED. Case discussed with ED provider who requested admission.  Plan of care discussed with patient, no visitors at bedside.

## 2023-08-30 NOTE — CONSULTS
[facility]  Department of Cardiology  Consult Note      PATIENT NAME: Divine Ray  MRN: [id number]  TODAY'S DATE: 08/30/2023  ADMIT DATE: 8/30/2023                          CONSULT REQUESTED BY: Jacque Barker MD    SUBJECTIVE     PRINCIPAL PROBLEM: Chest pain      REASON FOR CONSULT:  Chest pain    HPI:  Pt presented to ER with c/o midsternal chest tightness ongoing past 2 weeks. She states pain comes and goes, is not radiating to arm, neck or back. Pain usually lasts no more than 5 minutes. She woke up this am with feeling her heart was beating loud and fast  Endorsed concurrent shortness of breath with chest tightness; she also feels she has a lot of anxiety and worries about numerous things.  She denies prior MI, CVA; No prior cardiac procedures  No swelling, orthopnea, dizziness, syncope, n/v or diaphoresis.    PMH: Htn, anxiety  SX: hysterectomy, tonsillectomy, tubal ligation, left breast bx  Pt lives alone, does not smoke, drink ETOH or drug use; fairly active, gardening, housework  Mom had heart issues, had angiogram in past; Dad: CVA, HTN  PCP Dr. Landrum        Review of patient's allergies indicates:   Allergen Reactions    Anesthesia s/i-40 (propofol) [propofol] Nausea And Vomiting       Past Medical History:   Diagnosis Date    Aortic valve stenosis 2019 TRISTIN- Dr Rivera    Asymptomatic - since age 21    Hypertension      Past Surgical History:   Procedure Laterality Date    BREAST BIOPSY      Benign, calcification    HYSTERECTOMY      TONSILLECTOMY       Social History     Tobacco Use    Smoking status: Never    Smokeless tobacco: Never   Substance Use Topics    Alcohol use: Not Currently    Drug use: Never        REVIEW OF SYSTEMS  CONSTITUTIONAL: Negative for chills, fatigue and fever.   EYES: No double vision, No blurred vision  NEURO: No headaches, No dizziness  RESPIRATORY: Negative for cough, shortness of breath and wheezing.    CARDIOVASCULAR: +chest pain per HPI. Negative for  palpitations and leg swelling.   GI: Negative for abdominal pain, No melena, diarrhea, nausea and vomiting.   : Negative for dysuria and frequency, Negative for hematuria  SKIN: Negative for bruising, Negative for edema or discoloration noted.   ENDOCRINE: Negative for polyphagia, Negative for heat intolerance, Negative for cold intolerance  PSYCHIATRIC: Negative for depression, Negative for anxiety, Negative for memory loss  MUSCULOSKELETAL: Negative for neck pain, Negative for muscle weakness, Negative for back pain     OBJECTIVE     VITAL SIGNS (Most Recent)  Temp: 97.9 °F (36.6 °C) (08/30/23 0447)  Pulse: 75 (08/30/23 1202)  Resp: 18 (08/30/23 1202)  BP: (!) 156/108 (08/30/23 1202)  SpO2: 98 % (08/30/23 1202)    VENTILATION STATUS  Resp: 18 (08/30/23 1202)  SpO2: 98 % (08/30/23 1202)           I & O (Last 24H):  Intake/Output Summary (Last 24 hours) at 8/30/2023 1221  Last data filed at 8/30/2023 0918  Gross per 24 hour   Intake 100 ml   Output --   Net 100 ml       WEIGHTS  Wt Readings from Last 3 Encounters:   08/30/23 0447 62.1 kg (137 lb)   08/30/23 1124 62.1 kg (137 lb)   08/29/23 0919 61.4 kg (135 lb 5.8 oz)       PHYSICAL EXAM  GENERAL: pleasant elderly female breathing comfortably on r/a in no apparent distress alert and oriented.   HEENT: Normocephalic. .   NECK: No JVD. No bruit..   CARDIAC: Regular rate and rhythm. S1 is normal.S2 is normal.No gallops, 4/6 murmur audible right sternal border  CHEST ANATOMY: normal.   LUNGS: Clear to auscultation. No wheezing or rhonchi..   ABDOMEN: Soft    URINARY: No coto catheter   EXTREMITIES: No cyanosis, clubbing or edema noted at this time.,   PERIPHERAL VASCULAR SYSTEM: Good palpable distal pulses.   CENTRAL NERVOUS SYSTEM: No focal motor or sensory deficits noted.       HOME MEDICATIONS:  Current Facility-Administered Medications on File Prior to Encounter   Medication Dose Route Frequency Provider Last Rate Last Admin    methylPREDNISolone acetate  injection 80 mg  80 mg Intra-articular  Yan Argueta II, MD   80 mg at 07/17/23 0945     Current Outpatient Medications on File Prior to Encounter   Medication Sig Dispense Refill    amLODIPine (NORVASC) 5 MG tablet Take 1 tablet (5 mg total) by mouth once daily. 90 tablet 4    calcium carbonate/vitamin D3 (CALTRATE-600 PLUS VITAMIN D3 ORAL) Take 1 tablet by mouth once daily.      docusate sodium (COLACE) 250 MG capsule Take 250 mg by mouth once daily.      losartan (COZAAR) 100 MG tablet Take 1 tablet (100 mg total) by mouth once daily. 90 tablet 4    sertraline (ZOLOFT) 25 MG tablet Take 1 tablet (25 mg total) by mouth once daily. 30 tablet 11    wheat dextrin (BENEFIBER HEALTHY SHAPE) 5 gram/7.4 gram Powd Take 1 packet by mouth Daily.      clobetasol 0.05% (TEMOVATE) 0.05 % Oint APPLY TOPICALLY TO THE AFFECTED AREA DAILY AS NEEDED 45 g 0    hydrOXYzine HCL (ATARAX) 25 MG tablet Take 1 tablet (25 mg total) by mouth 3 (three) times daily as needed for Itching. 30 tablet 0    levocetirizine (XYZAL) 5 MG tablet Take 1 tablet (5 mg total) by mouth every evening. 30 tablet 11    triamcinolone acetonide 0.1% (KENALOG) 0.1 % cream AAA bid 454 g 3    [DISCONTINUED] albuterol (PROVENTIL HFA) 90 mcg/actuation inhaler Inhale 2 puffs into the lungs every 6 (six) hours as needed (cough/ wheezing). Rescue 6.7 g 0    [DISCONTINUED] BENEFIBER, GUAR GUM, ORAL Take by mouth.      [DISCONTINUED] miconazole (MICOTIN) 2 % cream Apply topically 2 (two) times daily. 28 g 0       SCHEDULED MEDS:   [START ON 8/31/2023] aspirin  81 mg Oral Daily    enoxparin  40 mg Subcutaneous Daily    losartan  100 mg Oral Daily    sertraline  25 mg Oral Daily       CONTINUOUS INFUSIONS:    PRN MEDS:acetaminophen, dextrose 50%, dextrose 50%, glucagon (human recombinant), glucose, glucose, magnesium oxide, magnesium oxide, naloxone, ondansetron, potassium bicarbonate, potassium bicarbonate, potassium bicarbonate, potassium, sodium phosphates,  "potassium, sodium phosphates, potassium, sodium phosphates, sodium chloride 0.9%    LABS AND DIAGNOSTICS     CBC LAST 3 DAYS  Recent Labs   Lab 08/30/23  0504   WBC 5.78   RBC 4.66   HGB 14.2   HCT 41.1   MCV 88   MCH 30.5   MCHC 34.5   RDW 12.9      MPV 10.4   GRAN 73.3*  4.2   LYMPH 19.4  1.1   MONO 5.5  0.3   BASO 0.04   NRBC 0       COAGULATION LAST 3 DAYS  No results for input(s): "LABPT", "INR", "APTT" in the last 168 hours.    CHEMISTRY LAST 3 DAYS  Recent Labs   Lab 08/30/23  0504      K 3.4*      CO2 22*   ANIONGAP 8   BUN 17   CREATININE 0.8   *   CALCIUM 9.2   MG 1.9   ALBUMIN 3.9   PROT 6.7   ALKPHOS 62   ALT 18   AST 16   BILITOT 1.0       CARDIAC PROFILE LAST 3 DAYS  Recent Labs   Lab 08/30/23  0504   BNP 37       ENDOCRINE LAST 3 DAYS  Recent Labs   Lab 08/30/23  0504   TSH 1.860       LAST ARTERIAL BLOOD GAS  ABG  No results for input(s): "PH", "PO2", "PCO2", "HCO3", "BE" in the last 168 hours.    LAST 7 DAYS MICROBIOLOGY   Microbiology Results (last 7 days)       ** No results found for the last 168 hours. **            MOST RECENT IMAGING  X-Ray Chest AP Portable  Portable AP chest, 8/30/2023 5:26 AM CDT    HISTORY:  82 years old ( 1940 ) Female patient ,  Chest Pain .    COMPARISON: No prior studies available for comparison.    FINDINGS:    The lungs are normally inflated and overall are clear. There is no pulmonary mass, edema, pneumothorax, infiltrate, or effusion.    The heart is normal in size. There is no evidence of pulmonary venous congestion.    The hilar and mediastinal regions are considered within normal limits. Trachea is midline.    The bones are intact with no bony lesions identified.    IMPRESSION:    1.  No acute findings.    Electronically signed by:  Jose Santoro MD  8/30/2023 6:41 AM CDT Workstation: [id number]      ECHOCARDIOGRAM RESULTS (last 5)  Results for orders placed in visit on 05/12/22    Echo    Interpretation Summary  · Normal " central venous pressure (3 mmHg).  · The estimated PA systolic pressure is 25 mmHg.  · Moderate concentric hypertrophy and normal systolic function.  · The estimated ejection fraction is 65%.  · Grade I left ventricular diastolic dysfunction.  · Normal right ventricular size with normal right ventricular systolic function.  · There is severe aortic valve stenosis.  · Aortic valve area is 1.18 cm2; peak velocity is 4.0 m/s; mean gradient is 31 mmHg.  · Mild mitral regurgitation.  · Mild tricuspid regurgitation.      Results for orders placed during the hospital encounter of 08/20/19    Transthoracic echo (TTE) 2D with Color Flow    Interpretation Summary  · Left ventricular systolic function. The estimated ejection fraction is 55%  · Normal right ventricular systolic function.  · Moderate left atrial enlargement.  · Moderate-to-severe aortic valve stenosis.  · Aortic valve area is 1.11 cm2; peak velocity is 2.88 m/s; mean gradient is 18 mmHg.  · Mild mitral regurgitation.  · Mild to moderate tricuspid regurgitation.      CURRENT/PREVIOUS VISIT EKG  Results for orders placed or performed during the hospital encounter of 08/30/23   EKG 12-lead    Collection Time: 08/30/23  4:55 AM    Narrative    Test Reason : R07.9,    Vent. Rate : 086 BPM     Atrial Rate : 086 BPM     P-R Int : 150 ms          QRS Dur : 086 ms      QT Int : 406 ms       P-R-T Axes : 063 025 055 degrees     QTc Int : 485 ms    Normal sinus rhythm  Possible Left atrial enlargement  Nonspecific ST abnormality  Abnormal ECG  No previous ECGs available    Referred By:             Confirmed By:            ASSESSMENT/PLAN:     Active Hospital Problems    Diagnosis    *Chest pain with palpitations    Anxiety    Severe aortic stenosis    Hypokalemia    Frequent PAC/PVC    Essential hypertension       ASSESSMENT & PLAN:   Chest pain  Palpitations   Aortic stenosis  HTN  Hypokalemia  Anxiety      RECOMMENDATIONS:  Pt presented with c/o chest pain and  palpitations. Troponin, BNP, TSH, CXR normal. EKG NSR  2.   Echocardiogram already done/pending. Last echo in 5/22 showed severe AS with Aortic valve area is 1.18 cm2; peak velocity is 4.0 m/s; mean gradient is 31 mmHg.Her primary cardiologist recommended TAVR at that time but pt declined   3.   Potassium 3.4 Replace per protocol and continue to check and replace potassium and magnesium. Goal for potassium is 4.0, and goal for magnesium is 2.0.   4.   Will evaluate today's echo. If aortic valve stenosis not severe will obtain stress test tomorrow. If aortic stenosis severe she will need diagnostic angiogram. Keep NPO after midnight.     Kiley Robbins NP  [facility]  Department of Cardiology  Date of Service: 08/30/2023        I have personally interviewed and examined the patient, I have reviewed the Nurse Practitioner's history and physical, assessment, and plan. I agree with the findings and plan.      Dr. Jon M.D.  [facility]  Department of Cardiology  Date of Service: 08/30/2023       5

## 2023-08-30 NOTE — SUBJECTIVE & OBJECTIVE
Past Medical History:   Diagnosis Date    Aortic valve stenosis 2019 TRISTIN- Dr Rivera    Asymptomatic - since age 21    Hypertension        Past Surgical History:   Procedure Laterality Date    BREAST BIOPSY      Benign, calcification    HYSTERECTOMY      TONSILLECTOMY         Review of patient's allergies indicates:   Allergen Reactions    Anesthesia s/i-40 (propofol) [propofol] Nausea And Vomiting       Current Facility-Administered Medications on File Prior to Encounter   Medication    methylPREDNISolone acetate injection 80 mg     Current Outpatient Medications on File Prior to Encounter   Medication Sig    amLODIPine (NORVASC) 5 MG tablet Take 1 tablet (5 mg total) by mouth once daily.    calcium carbonate/vitamin D3 (CALTRATE-600 PLUS VITAMIN D3 ORAL) Take 1 tablet by mouth once daily.    docusate sodium (COLACE) 250 MG capsule Take 250 mg by mouth once daily.    losartan (COZAAR) 100 MG tablet Take 1 tablet (100 mg total) by mouth once daily.    sertraline (ZOLOFT) 25 MG tablet Take 1 tablet (25 mg total) by mouth once daily.    wheat dextrin (BENEFIBER HEALTHY SHAPE) 5 gram/7.4 gram Powd Take 1 packet by mouth Daily.    clobetasol 0.05% (TEMOVATE) 0.05 % Oint APPLY TOPICALLY TO THE AFFECTED AREA DAILY AS NEEDED    hydrOXYzine HCL (ATARAX) 25 MG tablet Take 1 tablet (25 mg total) by mouth 3 (three) times daily as needed for Itching.    levocetirizine (XYZAL) 5 MG tablet Take 1 tablet (5 mg total) by mouth every evening.    triamcinolone acetonide 0.1% (KENALOG) 0.1 % cream AAA bid    [DISCONTINUED] albuterol (PROVENTIL HFA) 90 mcg/actuation inhaler Inhale 2 puffs into the lungs every 6 (six) hours as needed (cough/ wheezing). Rescue    [DISCONTINUED] BENEFIBER, GUAR GUM, ORAL Take by mouth.    [DISCONTINUED] miconazole (MICOTIN) 2 % cream Apply topically 2 (two) times daily.     Family History       Problem Relation (Age of Onset)    Heart disease Mother    Hypertension Father          Tobacco Use    Smoking  status: Never    Smokeless tobacco: Never   Substance and Sexual Activity    Alcohol use: Not Currently    Drug use: Never    Sexual activity: Not Currently     Review of Systems   Constitutional:  Negative for chills and fever.   HENT:  Negative for congestion.    Respiratory:  Positive for shortness of breath.    Cardiovascular:  Positive for chest pain and palpitations.   Gastrointestinal:  Negative for abdominal pain, constipation, diarrhea, nausea and vomiting.   Genitourinary:  Negative for dysuria and frequency.   Neurological:  Negative for seizures.   Psychiatric/Behavioral:  Negative for confusion. The patient is nervous/anxious.      Objective:     Vital Signs (Most Recent):  Temp: 97.9 °F (36.6 °C) (08/30/23 0447)  Pulse: 69 (08/30/23 0732)  Resp: 18 (08/30/23 0732)  BP: (!) 159/70 (08/30/23 0732)  SpO2: 97 % (08/30/23 0732) Vital Signs (24h Range):  Temp:  [97.9 °F (36.6 °C)-98.6 °F (37 °C)] 97.9 °F (36.6 °C)  Pulse:  [] 69  Resp:  [16-18] 18  SpO2:  [96 %-97 %] 97 %  BP: (132-162)/(70-87) 159/70     Weight: 62.1 kg (137 lb)  Body mass index is 22.8 kg/m².     Physical Exam  Vitals and nursing note reviewed.   Constitutional:       General: She is not in acute distress.     Appearance: Normal appearance. She is normal weight. She is not ill-appearing or toxic-appearing.      Comments: Sitting in stretcher, NAD   HENT:      Head: Normocephalic and atraumatic.      Mouth/Throat:      Mouth: Mucous membranes are moist.   Eyes:      General:         Right eye: No discharge.         Left eye: No discharge.   Cardiovascular:      Rate and Rhythm: Normal rate.      Heart sounds: Murmur heard.      Comments: +frequent additional beats, no LE edema  Pulmonary:      Effort: No respiratory distress.      Breath sounds: No stridor. No wheezing.      Comments: On RA  Abdominal:      General: Bowel sounds are normal. There is no distension.      Palpations: Abdomen is soft.      Tenderness: There is no  "abdominal tenderness. There is no guarding.   Genitourinary:     Comments: No suprapubic fullness or tenderness  Skin:     General: Skin is warm.   Neurological:      General: No focal deficit present.      Mental Status: She is alert and oriented to person, place, and time. Mental status is at baseline.   Psychiatric:         Mood and Affect: Mood normal.                Significant Labs: BMP:   Recent Labs   Lab 08/30/23  0504   *      K 3.4*      CO2 22*   BUN 17   CREATININE 0.8   CALCIUM 9.2   MG 1.9     CBC:   Recent Labs   Lab 08/30/23  0504   WBC 5.78   HGB 14.2   HCT 41.1        CMP:   Recent Labs   Lab 08/30/23  0504      K 3.4*      CO2 22*   *   BUN 17   CREATININE 0.8   CALCIUM 9.2   PROT 6.7   ALBUMIN 3.9   BILITOT 1.0   ALKPHOS 62   AST 16   ALT 18   ANIONGAP 8     Cardiac Markers:   Recent Labs   Lab 08/30/23  0504   BNP 37     Lactic Acid: No results for input(s): "LACTATE" in the last 48 hours.  Magnesium:   Recent Labs   Lab 08/30/23  0504   MG 1.9     POCT Glucose: No results for input(s): "POCTGLUCOSE" in the last 48 hours.  Troponin:   Recent Labs   Lab 08/30/23  0504 08/30/23  0658   TROPONINIHS 6.8 17.2*     TSH: No results for input(s): "TSH" in the last 4320 hours.  Urine Culture: No results for input(s): "LABURIN" in the last 48 hours.  Urine Studies: No results for input(s): "COLORU", "APPEARANCEUA", "PHUR", "SPECGRAV", "PROTEINUA", "GLUCUA", "KETONESU", "BILIRUBINUA", "OCCULTUA", "NITRITE", "UROBILINOGEN", "LEUKOCYTESUR", "RBCUA", "WBCUA", "BACTERIA", "SQUAMEPITHEL", "HYALINECASTS" in the last 48 hours.    Invalid input(s): "WRIGHTSUR"    Significant Imaging: I have reviewed all pertinent imaging results/findings within the past 24 hours.    X-Ray Chest AP Portable    Result Date: 8/30/2023  Portable AP chest, 8/30/2023 5:26 AM CDT HISTORY:  82 years old ( 1940 ) Female patient ,  Chest Pain . COMPARISON: No prior studies available for " comparison. FINDINGS: The lungs are normally inflated and overall are clear. There is no pulmonary mass, edema, pneumothorax, infiltrate, or effusion. The heart is normal in size. There is no evidence of pulmonary venous congestion. The hilar and mediastinal regions are considered within normal limits. Trachea is midline. The bones are intact with no bony lesions identified. IMPRESSION: 1.  No acute findings. Electronically signed by:  Jose Santoro MD  8/30/2023 6:41 AM CDT Workstation: [id number]     Echocardiogram  Summary    Normal central venous pressure (3 mmHg).  The estimated PA systolic pressure is 25 mmHg.  Moderate concentric hypertrophy and normal systolic function.  The estimated ejection fraction is 65%.  Grade I left ventricular diastolic dysfunction.  Normal right ventricular size with normal right ventricular systolic function.  There is severe aortic valve stenosis.  Aortic valve area is 1.18 cm2; peak velocity is 4.0 m/s; mean gradient is 31 mmHg.  Mild mitral regurgitation.  Mild tricuspid regurgitation.

## 2023-08-30 NOTE — PHARMACY MED REC
"Admission Medication History     The home medication history was taken by Carlos Dia.    You may go to "Admission" then "Reconcile Home Medications" tabs to review and/or act upon these items.     The home medication list has been updated by the Pharmacy department.   Please read ALL comments highlighted in yellow.   Please address this information as you see fit.    Feel free to contact us if you have any questions or require assistance.      The medications listed below were removed from the home medication list. Please reorder if appropriate:  Patient reports no longer taking the following medication(s):  Albuterol HFA  Miconazole Cream      Medications listed below were obtained from: Patient/family and Analytic software- [employer]  Current Facility-Administered Medications on File Prior to Encounter   Medication Dose Route Frequency Provider Last Rate Last Admin    methylPREDNISolone acetate injection 80 mg  80 mg Intra-articular  Yan Argueta II, MD   80 mg at 07/17/23 0937     Current Outpatient Medications on File Prior to Encounter   Medication Sig Dispense Refill    amLODIPine (NORVASC) 5 MG tablet Take 1 tablet (5 mg total) by mouth once daily. 90 tablet 4    calcium carbonate/vitamin D3 (CALTRATE-600 PLUS VITAMIN D3 ORAL) Take 1 tablet by mouth once daily.      docusate sodium (COLACE) 250 MG capsule Take 250 mg by mouth once daily.      losartan (COZAAR) 100 MG tablet Take 1 tablet (100 mg total) by mouth once daily. 90 tablet 4    sertraline (ZOLOFT) 25 MG tablet Take 1 tablet (25 mg total) by mouth once daily. 30 tablet 11    wheat dextrin (BENEFIBER HEALTHY SHAPE) 5 gram/7.4 gram Powd Take 1 packet by mouth Daily.      clobetasol 0.05% (TEMOVATE) 0.05 % Oint APPLY TOPICALLY TO THE AFFECTED AREA DAILY AS NEEDED 45 g 0    hydrOXYzine HCL (ATARAX) 25 MG tablet Take 1 tablet (25 mg total) by mouth 3 (three) times daily as needed for Itching. 30 tablet 0    levocetirizine (XYZAL) 5 MG tablet Take 1 " tablet (5 mg total) by mouth every evening. 30 tablet 11    triamcinolone acetonide 0.1% (KENALOG) 0.1 % cream AAA bid 454 g 3    [DISCONTINUED] albuterol (PROVENTIL HFA) 90 mcg/actuation inhaler Inhale 2 puffs into the lungs every 6 (six) hours as needed (cough/ wheezing). Rescue 6.7 g 0    [DISCONTINUED] BENEFIBER, GUAR GUM, ORAL Take by mouth.      [DISCONTINUED] miconazole (MICOTIN) 2 % cream Apply topically 2 (two) times daily. 28 g 0             Carlos Dia  EXT 1924                 .

## 2023-08-30 NOTE — TELEPHONE ENCOUNTER
Joceline Potts Staff    ----- Message from Joceline Potts sent at 8/30/2023 11:33 AM CDT -----  Contact: self  Type: Needs Medical Advice  Who Called:  pt  Symptoms (please be specific):  n/a  How long has patient had these symptoms:  n/a  Pharmacy name and phone #:  n/a  Best Call Back Number: [phone]   Additional Information: pt would like to speak with the office regarding a cardiologist.please call

## 2023-08-31 ENCOUNTER — HOSPITAL ENCOUNTER (OUTPATIENT)
Dept: RADIOLOGY | Facility: HOSPITAL | Age: 83
Discharge: HOME | End: 2023-08-31
Attending: EMERGENCY MEDICINE | Admitting: INTERNAL MEDICINE
Payer: MEDICARE

## 2023-08-31 ENCOUNTER — CLINICAL SUPPORT (OUTPATIENT)
Dept: CARDIOLOGY | Facility: HOSPITAL | Age: 83
End: 2023-08-31
Attending: EMERGENCY MEDICINE
Payer: MEDICARE

## 2023-08-31 ENCOUNTER — TELEPHONE (OUTPATIENT)
Dept: FAMILY MEDICINE | Facility: CLINIC | Age: 83
End: 2023-08-31
Payer: MEDICARE

## 2023-08-31 VITALS
HEART RATE: 77 BPM | WEIGHT: 133.69 LBS | TEMPERATURE: 99 F | HEIGHT: 65 IN | RESPIRATION RATE: 17 BRPM | BODY MASS INDEX: 22.27 KG/M2 | DIASTOLIC BLOOD PRESSURE: 71 MMHG | SYSTOLIC BLOOD PRESSURE: 128 MMHG | OXYGEN SATURATION: 97 %

## 2023-08-31 PROBLEM — E87.6 HYPOKALEMIA: Status: RESOLVED | Noted: 2023-08-30 | Resolved: 2023-08-31

## 2023-08-31 LAB
ANION GAP SERPL CALC-SCNC: 5 MMOL/L (ref 8–16)
BACTERIA UR CULT: NO GROWTH
BUN SERPL-MCNC: 12 MG/DL (ref 8–23)
CALCIUM SERPL-MCNC: 9.1 MG/DL (ref 8.7–10.5)
CHLORIDE SERPL-SCNC: 109 MMOL/L (ref 95–110)
CO2 SERPL-SCNC: 24 MMOL/L (ref 23–29)
CREAT SERPL-MCNC: 0.8 MG/DL (ref 0.5–1.4)
CV PHARM DOSE: 0.4 MG
CV STRESS BASE HR: 84 BPM
DIASTOLIC BLOOD PRESSURE: 77 MMHG
ERYTHROCYTE [DISTWIDTH] IN BLOOD BY AUTOMATED COUNT: 13.1 % (ref 11.5–14.5)
EST. GFR  (NO RACE VARIABLE): >60 ML/MIN/1.73 M^2
GLUCOSE SERPL-MCNC: 111 MG/DL (ref 70–110)
HCT VFR BLD AUTO: 39.7 % (ref 37–48.5)
HGB BLD-MCNC: 13.2 G/DL (ref 12–16)
MAGNESIUM SERPL-MCNC: 2.2 MG/DL (ref 1.6–2.6)
MCH RBC QN AUTO: 29.8 PG (ref 27–31)
MCHC RBC AUTO-ENTMCNC: 33.2 G/DL (ref 32–36)
MCV RBC AUTO: 90 FL (ref 82–98)
OHS CV CPX 1 MINUTE RECOVERY HEART RATE: 91 BPM
OHS CV CPX 85 PERCENT MAX PREDICTED HEART RATE MALE: 115
OHS CV CPX MAX PREDICTED HEART RATE: 135
OHS CV CPX PATIENT IS FEMALE: 1
OHS CV CPX PATIENT IS MALE: 0
OHS CV CPX PEAK DIASTOLIC BLOOD PRESSURE: 100 MMHG
OHS CV CPX PEAK HEAR RATE: 113 BPM
OHS CV CPX PEAK RATE PRESSURE PRODUCT: NORMAL
OHS CV CPX PEAK SYSTOLIC BLOOD PRESSURE: 130 MMHG
OHS CV CPX PERCENT MAX PREDICTED HEART RATE ACHIEVED: 84
OHS CV CPX RATE PRESSURE PRODUCT PRESENTING: NORMAL
PHOSPHATE SERPL-MCNC: 3.3 MG/DL (ref 2.7–4.5)
PLATELET # BLD AUTO: 190 K/UL (ref 150–450)
PMV BLD AUTO: 10.4 FL (ref 9.2–12.9)
POTASSIUM SERPL-SCNC: 3.7 MMOL/L (ref 3.5–5.1)
RBC # BLD AUTO: 4.43 M/UL (ref 4–5.4)
SODIUM SERPL-SCNC: 138 MMOL/L (ref 136–145)
SYSTOLIC BLOOD PRESSURE: 138 MMHG
WBC # BLD AUTO: 6.3 K/UL (ref 3.9–12.7)

## 2023-08-31 PROCEDURE — 93017 CV STRESS TEST TRACING ONLY: CPT

## 2023-08-31 PROCEDURE — 63600175 PHARM REV CODE 636 W HCPCS: Performed by: INTERNAL MEDICINE

## 2023-08-31 PROCEDURE — 84100 ASSAY OF PHOSPHORUS: CPT | Performed by: INTERNAL MEDICINE

## 2023-08-31 PROCEDURE — 93018 NUCLEAR STRESS TEST (CUPID ONLY): ICD-10-PCS | Mod: ,,, | Performed by: INTERNAL MEDICINE

## 2023-08-31 PROCEDURE — 78452 HT MUSCLE IMAGE SPECT MULT: CPT | Mod: TC

## 2023-08-31 PROCEDURE — 83735 ASSAY OF MAGNESIUM: CPT | Performed by: INTERNAL MEDICINE

## 2023-08-31 PROCEDURE — 99213 PR OFFICE/OUTPT VISIT, EST, LEVL III, 20-29 MIN: ICD-10-PCS | Mod: 25,,, | Performed by: INTERNAL MEDICINE

## 2023-08-31 PROCEDURE — 94761 N-INVAS EAR/PLS OXIMETRY MLT: CPT

## 2023-08-31 PROCEDURE — 85027 COMPLETE CBC AUTOMATED: CPT | Performed by: INTERNAL MEDICINE

## 2023-08-31 PROCEDURE — 93016 CV STRESS TEST SUPVJ ONLY: CPT | Mod: ,,, | Performed by: INTERNAL MEDICINE

## 2023-08-31 PROCEDURE — G0378 HOSPITAL OBSERVATION PER HR: HCPCS

## 2023-08-31 PROCEDURE — 99213 OFFICE O/P EST LOW 20 MIN: CPT | Mod: 25,,, | Performed by: INTERNAL MEDICINE

## 2023-08-31 PROCEDURE — 93018 CV STRESS TEST I&R ONLY: CPT | Mod: ,,, | Performed by: INTERNAL MEDICINE

## 2023-08-31 PROCEDURE — 36415 COLL VENOUS BLD VENIPUNCTURE: CPT | Performed by: INTERNAL MEDICINE

## 2023-08-31 PROCEDURE — 25000003 PHARM REV CODE 250: Performed by: INTERNAL MEDICINE

## 2023-08-31 PROCEDURE — 80048 BASIC METABOLIC PNL TOTAL CA: CPT | Performed by: INTERNAL MEDICINE

## 2023-08-31 PROCEDURE — 93016 NUCLEAR STRESS TEST (CUPID ONLY): ICD-10-PCS | Mod: ,,, | Performed by: INTERNAL MEDICINE

## 2023-08-31 PROCEDURE — A9502 TC99M TETROFOSMIN: HCPCS

## 2023-08-31 RX ORDER — METOPROLOL TARTRATE 25 MG/1
12.5 TABLET, FILM COATED ORAL 2 TIMES DAILY
Qty: 30 TABLET | Refills: 0 | Status: SHIPPED | OUTPATIENT
Start: 2023-08-31 | End: 2023-10-25 | Stop reason: SDUPTHER

## 2023-08-31 RX ORDER — METOPROLOL TARTRATE 25 MG/1
12.5 TABLET ORAL 2 TIMES DAILY
Status: DISCONTINUED | OUTPATIENT
Start: 2023-08-31 | End: 2023-08-31 | Stop reason: HOSPADM

## 2023-08-31 RX ORDER — REGADENOSON 0.08 MG/ML
0.4 INJECTION, SOLUTION INTRAVENOUS ONCE
Status: COMPLETED | OUTPATIENT
Start: 2023-08-31 | End: 2023-08-31

## 2023-08-31 RX ADMIN — REGADENOSON 0.4 MG: 0.08 INJECTION, SOLUTION INTRAVENOUS at 11:08

## 2023-08-31 RX ADMIN — SERTRALINE HYDROCHLORIDE 25 MG: 25 TABLET ORAL at 05:08

## 2023-08-31 RX ADMIN — POTASSIUM BICARBONATE 50 MEQ: 977.5 TABLET, EFFERVESCENT ORAL at 05:08

## 2023-08-31 RX ADMIN — LOSARTAN POTASSIUM 100 MG: 50 TABLET, FILM COATED ORAL at 05:08

## 2023-08-31 RX ADMIN — METOPROLOL TARTRATE 12.5 MG: 25 TABLET, FILM COATED ORAL at 04:08

## 2023-08-31 RX ADMIN — ASPIRIN 81 MG 81 MG: 81 TABLET ORAL at 09:08

## 2023-08-31 NOTE — PLAN OF CARE
Patient cleared for discharge from case management standpoint.    CM attempted to schedule PCP appointment st bedside but pt refused due to Dr Landrum not having available appointments. Pt does not want to see PA or NP. CM called and left a message for an appointment. (See prev note)    Chart and discharge orders reviewed.  Patient discharged home with no further case management needs.       08/31/23 1630   Final Note   Assessment Type Final Discharge Note   Anticipated Discharge Disposition Home   Hospital Resources/Appts/Education Provided Provided patient/caregiver with written discharge plan information;Patient refused appointment set-up   Post-Acute Status   Discharge Delays None known at this time

## 2023-08-31 NOTE — HOSPITAL COURSE
Patient was admitted under observation status further evaluation monitoring.  Telemetry with frequent PACs.  Cardiology was consulted.  Transthoracic echocardiogram with EF of 65-70% with moderate aortic stenosis.  Nuclear stress test negative, no reversible ischemia, EF of 90%.  Clinically she felt improved.  Started on low-dose Lopressor 12.5 mg b.i.d..  Patient will follow-up outpatient PCP and Cardiology.  Discharge plan including medication, follow-up as well as return precautions were reviewed, she expressed understanding, no questions or concerns.  Discussed with Cardiology on day of discharge.      Discharge examination   Sitting side of bed, alert, oriented, on room air

## 2023-08-31 NOTE — PLAN OF CARE
08/31/23 1631   BALBUENA Message   Medicare Outpatient and Observation Notification regarding financial responsibility Given to patient/caregiver;Explained to patient/caregiver;Signed/date by patient/caregiver   Date BALBUENA was signed 08/31/23   Time BALBUENA was signed [id number]

## 2023-08-31 NOTE — PLAN OF CARE
CM attempted to schedule PCP appointment at bedside. Dr Landrum does not have available appointments until October. Pt refused scheduling with other providers in the office. CM called Dr Landrum's office to request an appointment.  messaged Dr Landrum's nurse. Office will call pt with appointment details.

## 2023-08-31 NOTE — PLAN OF CARE
[facility]  Initial Discharge Assessment       Primary Care Provider: Jose Manuel Landrum MD    Admission Diagnosis: Palpitations [R00.2]    Admission Date: 8/30/2023  Expected Discharge Date: 9/1/2023    Transition of Care Barriers: None    Initial assessment completed at bedside with pt. Pt anticipates discharge home when medically clear.    Payor: Bday MANAGED MEDICARE / Plan: Bday CHOICES 65 / Product Type: Medicare Advantage /     Extended Emergency Contact Information  Primary Emergency Contact: Deny Paige  Address: UNKNOWN   [place]  Mobile Phone: [phone]  Relation: Son  Preferred language: English   needed? No  Secondary Emergency Contact: Denisse Ng  Mobile Phone: [phone]  Relation: Sister  Preferred language: English    Discharge Plan A: Home  Discharge Plan B: Home      [id number] [place] - [address]  [address] [address]  Phone: [phone] Fax: [phone]      Initial Assessment (most recent)       Adult Discharge Assessment - 08/31/23 1431          Discharge Assessment    Assessment Type Discharge Planning Assessment     Confirmed/corrected address, phone number and insurance Yes     Confirmed Demographics Correct on Facesheet     Source of Information patient     When was your last doctors appointment? 04/07/23     Does patient/caregiver understand observation status Yes     Communicated BRITTNI with patient/caregiver Yes     Reason For Admission Palpitations     People in Home alone     Facility Arrived From: home     Do you expect to return to your current living situation? Yes     Do you have help at home or someone to help you manage your care at home? No     Prior to hospitilization cognitive status: Alert/Oriented     Current cognitive status: Alert/Oriented     Walking or Climbing Stairs --   none    Dressing/Bathing --   none     Home Layout Able to live on 1st floor     Equipment Currently Used at Home none     Readmission within 30 days? No     Patient currently being followed by outpatient case management? No     Do you currently have service(s) that help you manage your care at home? No     Do you take prescription medications? Yes     Do you have prescription coverage? Yes     Coverage PHN     Do you have any problems affording any of your prescribed medications? No     Is the patient taking medications as prescribed? yes     Who is going to help you get home at discharge? Denisse (sister)     How do you get to doctors appointments? car, drives self     Are you on dialysis? No     Do you take coumadin? No     DME Needed Upon Discharge  none     Discharge Plan discussed with: Patient     Transition of Care Barriers None     Discharge Plan A Home     Discharge Plan B Home        Physical Activity    On average, how many days per week do you engage in moderate to strenuous exercise (like a brisk walk)? 7 days     On average, how many minutes do you engage in exercise at this level? 60 min        Financial Resource Strain    How hard is it for you to pay for the very basics like food, housing, medical care, and heating? Not hard at all        Housing Stability    In the last 12 months, was there a time when you were not able to pay the mortgage or rent on time? No     In the last 12 months, how many places have you lived? 1     In the last 12 months, was there a time when you did not have a steady place to sleep or slept in a shelter (including now)? No        Transportation Needs    In the past 12 months, has lack of transportation kept you from medical appointments or from getting medications? No     In the past 12 months, has lack of transportation kept you from meetings, work, or from getting things needed for daily living? No        Food Insecurity    Within the past 12 months, you worried that your food would run out before you got the  money to buy more. Never true     Within the past 12 months, the food you bought just didn't last and you didn't have money to get more. Never true        Stress    Do you feel stress - tense, restless, nervous, or anxious, or unable to sleep at night because your mind is troubled all the time - these days? Only a little        Social Connections    In a typical week, how many times do you talk on the phone with family, friends, or neighbors? More than three times a week     How often do you get together with friends or relatives? More than three times a week     How often do you attend Bahai or Mosque services? More than 4 times per year     Do you belong to any clubs or organizations such as Bahai groups, unions, fraternal or athletic groups, or school groups? Yes     How often do you attend meetings of the clubs or organizations you belong to? More than 4 times per year     Are you , , , , never , or living with a partner?         Alcohol Use    Q1: How often do you have a drink containing alcohol? Never     Q2: How many drinks containing alcohol do you have on a typical day when you are drinking? Patient does not drink     Q3: How often do you have six or more drinks on one occasion? Never

## 2023-08-31 NOTE — CARE UPDATE
08/31/23 0737   Patient Assessment/Suction   Level of Consciousness (AVPU) alert   Respiratory Effort Normal;Unlabored   PRE-TX-O2   Device (Oxygen Therapy) room air   SpO2 97 %   Pulse Oximetry Type Intermittent   $ Pulse Oximetry - Multiple Charge Pulse Oximetry - Multiple   Pulse 79

## 2023-08-31 NOTE — PROGRESS NOTES
[facility]  Department of Cardiology  Progress Note      PATIENT NAME: Divine Ray  MRN: [id number]  TODAY'S DATE: 08/31/2023  ADMIT DATE: 8/30/2023                          CONSULT REQUESTED BY: Jacque Barker MD    SUBJECTIVE     PRINCIPAL PROBLEM: Chest pain    INTERVAL HISTORY  8/31/23  No chest pain, dyspnea  Completed stress test    REASON FOR CONSULT:  Chest pain    HPI:  Pt presented to ER with c/o midsternal chest tightness ongoing past 2 weeks. She states pain comes and goes, is not radiating to arm, neck or back. Pain usually lasts no more than 5 minutes. She woke up this am with feeling her heart was beating loud and fast  Endorsed concurrent shortness of breath with chest tightness; she also feels she has a lot of anxiety and worries about numerous things.  She denies prior MI, CVA; No prior cardiac procedures  No swelling, orthopnea, dizziness, syncope, n/v or diaphoresis.    PMH: Htn, anxiety  SX: hysterectomy, tonsillectomy, tubal ligation, left breast bx  Pt lives alone, does not smoke, drink ETOH or drug use; fairly active, gardening, housework  Mom had heart issues, had angiogram in past; Dad: CVA, HTN  PCP Dr. Landrum        Review of patient's allergies indicates:   Allergen Reactions    Anesthesia s/i-40 (propofol) [propofol] Nausea And Vomiting       Past Medical History:   Diagnosis Date    Aortic valve stenosis 2019 TRIST[facility]- Dr Rivera    Asymptomatic - since age 21    Hypertension      Past Surgical History:   Procedure Laterality Date    BREAST BIOPSY      Benign, calcification    HYSTERECTOMY      TONSILLECTOMY       Social History     Tobacco Use    Smoking status: Never    Smokeless tobacco: Never   Substance Use Topics    Alcohol use: Not Currently    Drug use: Never        REVIEW OF SYSTEMS  CONSTITUTIONAL: Negative for chills, fatigue and fever.   EYES: No double vision, No blurred vision  NEURO: No headaches, No dizziness  RESPIRATORY: Negative for cough, shortness  of breath and wheezing.    CARDIOVASCULAR: +chest pain per HPI. Negative for palpitations and leg swelling.   GI: Negative for abdominal pain, No melena, diarrhea, nausea and vomiting.   : Negative for dysuria and frequency, Negative for hematuria  SKIN: Negative for bruising, Negative for edema or discoloration noted.   ENDOCRINE: Negative for polyphagia, Negative for heat intolerance, Negative for cold intolerance  PSYCHIATRIC: Negative for depression, Negative for anxiety, Negative for memory loss  MUSCULOSKELETAL: Negative for neck pain, Negative for muscle weakness, Negative for back pain     OBJECTIVE     VITAL SIGNS (Most Recent)  Temp: 99.1 °F (37.3 °C) (08/31/23 0641)  Pulse: 79 (08/31/23 0737)  Resp: 18 (08/31/23 0641)  BP: 139/86 (08/31/23 0641)  SpO2: 97 % (08/31/23 0737)    VENTILATION STATUS  Resp: 18 (08/31/23 0641)  SpO2: 97 % (08/31/23 0737)           I & O (Last 24H):  Intake/Output Summary (Last 24 hours) at 8/31/2023 0826  Last data filed at 8/31/2023 0735  Gross per 24 hour   Intake 340 ml   Output 3 ml   Net 337 ml         WEIGHTS  Wt Readings from Last 3 Encounters:   08/30/23 2020 60.6 kg (133 lb 11.2 oz)   08/30/23 0447 62.1 kg (137 lb)   08/30/23 1124 62.1 kg (137 lb)   08/29/23 0919 61.4 kg (135 lb 5.8 oz)       PHYSICAL EXAM  GENERAL: pleasant elderly female breathing comfortably on r/a in no apparent distress alert and oriented.   HEENT: Normocephalic. .   NECK: No JVD. No bruit..   CARDIAC: Regular rate and rhythm. S1 is normal.S2 is normal.No gallops, 4/6 murmur audible right sternal border  CHEST ANATOMY: normal.   LUNGS: Clear to auscultation. No wheezing or rhonchi..   ABDOMEN: Soft    URINARY: No coto catheter   EXTREMITIES: No cyanosis, clubbing or edema noted at this time.,   PERIPHERAL VASCULAR SYSTEM: Good palpable distal pulses.   CENTRAL NERVOUS SYSTEM: No focal motor or sensory deficits noted.       HOME MEDICATIONS:  Current Facility-Administered Medications on File  Prior to Encounter   Medication Dose Route Frequency Provider Last Rate Last Admin    methylPREDNISolone acetate injection 80 mg  80 mg Intra-articular  Yan Argueta II, MD   80 mg at 07/17/23 0959     Current Outpatient Medications on File Prior to Encounter   Medication Sig Dispense Refill    amLODIPine (NORVASC) 5 MG tablet Take 1 tablet (5 mg total) by mouth once daily. 90 tablet 4    calcium carbonate/vitamin D3 (CALTRATE-600 PLUS VITAMIN D3 ORAL) Take 1 tablet by mouth once daily.      docusate sodium (COLACE) 250 MG capsule Take 250 mg by mouth once daily.      losartan (COZAAR) 100 MG tablet Take 1 tablet (100 mg total) by mouth once daily. 90 tablet 4    sertraline (ZOLOFT) 25 MG tablet Take 1 tablet (25 mg total) by mouth once daily. 30 tablet 11    wheat dextrin (BENEFIBER HEALTHY SHAPE) 5 gram/7.4 gram Powd Take 1 packet by mouth Daily.      clobetasol 0.05% (TEMOVATE) 0.05 % Oint APPLY TOPICALLY TO THE AFFECTED AREA DAILY AS NEEDED 45 g 0    hydrOXYzine HCL (ATARAX) 25 MG tablet Take 1 tablet (25 mg total) by mouth 3 (three) times daily as needed for Itching. 30 tablet 0    levocetirizine (XYZAL) 5 MG tablet Take 1 tablet (5 mg total) by mouth every evening. 30 tablet 11    triamcinolone acetonide 0.1% (KENALOG) 0.1 % cream AAA bid 454 g 3       SCHEDULED MEDS:   aspirin  81 mg Oral Daily    enoxparin  40 mg Subcutaneous Daily    losartan  100 mg Oral Daily    sertraline  25 mg Oral Daily       CONTINUOUS INFUSIONS:    PRN MEDS:acetaminophen, dextrose 50%, dextrose 50%, glucagon (human recombinant), glucose, glucose, magnesium oxide, magnesium oxide, naloxone, ondansetron, potassium bicarbonate, potassium bicarbonate, potassium bicarbonate, potassium, sodium phosphates, potassium, sodium phosphates, potassium, sodium phosphates, sodium chloride 0.9%    LABS AND DIAGNOSTICS     CBC LAST 3 DAYS  Recent Labs   Lab 08/30/23  0504 08/31/23  0324   WBC 5.78 6.30   RBC 4.66 4.43   HGB 14.2 13.2   HCT 41.1  "39.7   MCV 88 90   MCH 30.5 29.8   MCHC 34.5 33.2   RDW 12.9 13.1    190   MPV 10.4 10.4   GRAN 73.3*  4.2  --    LYMPH 19.4  1.1  --    MONO 5.5  0.3  --    BASO 0.04  --    NRBC 0  --          COAGULATION LAST 3 DAYS  No results for input(s): "LABPT", "INR", "APTT" in the last 168 hours.    CHEMISTRY LAST 3 DAYS  Recent Labs   Lab 08/30/23  0504 08/31/23  0324    138   K 3.4* 3.7    109   CO2 22* 24   ANIONGAP 8 5*   BUN 17 12   CREATININE 0.8 0.8   * 111*   CALCIUM 9.2 9.1   MG 1.9 2.2   ALBUMIN 3.9  --    PROT 6.7  --    ALKPHOS 62  --    ALT 18  --    AST 16  --    BILITOT 1.0  --          CARDIAC PROFILE LAST 3 DAYS  Recent Labs   Lab 08/30/23  0504   BNP 37         ENDOCRINE LAST 3 DAYS  Recent Labs   Lab 08/30/23  0504   TSH 1.860         LAST ARTERIAL BLOOD GAS  ABG  No results for input(s): "PH", "PO2", "PCO2", "HCO3", "BE" in the last 168 hours.    LAST 7 DAYS MICROBIOLOGY   Microbiology Results (last 7 days)       ** No results found for the last 168 hours. **            MOST RECENT IMAGING  Echo    Left Ventricle: The left ventricle is normal in size. Mildly increased   wall thickness. Normal wall motion. There is normal systolic function with   a visually estimated ejection fraction of 65 - 70%.    Left Atrium: Left atrium is moderately dilated.    Right Ventricle: Normal right ventricular cavity size. Wall thickness   is normal. Right ventricle wall motion  is normal. Systolic function is   normal.    Right Atrium: Right atrium is mildly dilated.    Aortic Valve: The aortic valve is a trileaflet valve. Moderately   calcified cusps. Moderately restricted motion. There is moderate stenosis.   Aortic valve area by VTI is 2.05 cm². LVOT velocity time integral is 29.90   cm. Aortic valve peak velocity is 3.18 m/s. Mean gradient is 27 mmHg. The   dimensionless index is 0.54.    Mitral Valve: Mildly thickened leaflets. Moderate mitral annular   calcification.    IVC/SVC: Normal " venous pressure at 3 mmHg.  X-Ray Chest AP Portable  Portable AP chest, 8/30/2023 5:26 AM CDT    HISTORY:  82 years old ( 1940 ) Female patient ,  Chest Pain .    COMPARISON: No prior studies available for comparison.    FINDINGS:    The lungs are normally inflated and overall are clear. There is no pulmonary mass, edema, pneumothorax, infiltrate, or effusion.    The heart is normal in size. There is no evidence of pulmonary venous congestion.    The hilar and mediastinal regions are considered within normal limits. Trachea is midline.    The bones are intact with no bony lesions identified.    IMPRESSION:    1.  No acute findings.    Electronically signed by:  Jose Santoro MD  8/30/2023 6:41 AM CDT Workstation: [id number]      ECHOCARDIOGRAM RESULTS (last 5)  Results for orders placed in visit on 05/12/22    Echo    Interpretation Summary  · Normal central venous pressure (3 mmHg).  · The estimated PA systolic pressure is 25 mmHg.  · Moderate concentric hypertrophy and normal systolic function.  · The estimated ejection fraction is 65%.  · Grade I left ventricular diastolic dysfunction.  · Normal right ventricular size with normal right ventricular systolic function.  · There is severe aortic valve stenosis.  · Aortic valve area is 1.18 cm2; peak velocity is 4.0 m/s; mean gradient is 31 mmHg.  · Mild mitral regurgitation.  · Mild tricuspid regurgitation.      Results for orders placed during the hospital encounter of 08/20/19    Transthoracic echo (TTE) 2D with Color Flow    Interpretation Summary  · Left ventricular systolic function. The estimated ejection fraction is 55%  · Normal right ventricular systolic function.  · Moderate left atrial enlargement.  · Moderate-to-severe aortic valve stenosis.  · Aortic valve area is 1.11 cm2; peak velocity is 2.88 m/s; mean gradient is 18 mmHg.  · Mild mitral regurgitation.  · Mild to moderate tricuspid regurgitation.      CURRENT/PREVIOUS VISIT EKG  Results for  orders placed or performed during the hospital encounter of 08/30/23   EKG 12-lead    Collection Time: 08/30/23  4:55 AM    Narrative    Test Reason : R07.9,    Vent. Rate : 086 BPM     Atrial Rate : 086 BPM     P-R Int : 150 ms          QRS Dur : 086 ms      QT Int : 406 ms       P-R-T Axes : 063 025 055 degrees     QTc Int : 485 ms    Normal sinus rhythm  Possible Left atrial enlargement  Nonspecific ST abnormality  Abnormal ECG  No previous ECGs available  Confirmed by Jose Moody MD ([id number]) on 8/30/2023 12:45:24 PM    Referred By:             Confirmed By:Jose Moody MD           ASSESSMENT/PLAN:     Active Hospital Problems    Diagnosis    *Chest pain with palpitations    Anxiety    Severe aortic stenosis    Hypokalemia    Frequent PAC/PVC    Essential hypertension       ASSESSMENT & PLAN:   Chest pain  Palpitations   Aortic stenosis  HTN  Hypokalemia  Anxiety      RECOMMENDATIONS:  Pt presented with c/o chest pain and palpitations. Troponin, BNP, TSH, CXR normal. EKG NSR    2.   Echocardiogram  Aortic Valve: The aortic valve is a trileaflet valve. Moderately calcified cusps. Moderately restricted motion. There is moderate stenosis. Aortic valve area by VTI is 2.05 cm². LVOT velocity time integral is 29.90 cm. Aortic valve peak velocity is 3.18 m/s. Mean gradient is 27 mmHg. The dimensionless index is 0.54.    3.   Stress test negative for reversible ischemia         Kiley Robbins, NP  [facility]  Department of Cardiology  Date of Service: 08/31/2023        I have personally interviewed and examined the patient, I have reviewed the Nurse Practitioner's history and physical, assessment, and plan. I agree with the findings and plan.  NST negative for ischemia. Low dose BB started for ectopy. Out pt f/u with cardiology.     Dr. Jon M.D.  [facility]  Department of Cardiology  Date of Service: 08/31/2023

## 2023-08-31 NOTE — NURSING
Patient discharged per orders, all orders reviewed with patient, patient verbalized understanding, MD by patients side.  IV discontinued x 2, Tele box discontinued x 1.  Patient transferred to wheelchair x 1, all belongings by patient side.  Patients to transport herself home, patients car in the parking lot.  Family notified.

## 2023-08-31 NOTE — SUBJECTIVE & OBJECTIVE
Interval History:  No acute overnight events.  No new complaints.  States palpitations not as frequent.  Blood pressure not well controlled overnight, on room air.  Labs with potassium 3.7, magnesium 2.2, creatinine 0.8.  Transthoracic echocardiogram with EF of 65-70% with moderate aortic stenosis.  Nuclear stress test pending.  Discussed with patient.  Await further cardiology recommendations.    Review of Systems   Constitutional:  Negative for fever.   Cardiovascular:  Negative for leg swelling.   Genitourinary:  Negative for difficulty urinating.   Psychiatric/Behavioral:  Negative for confusion.      Objective:     Vital Signs (Most Recent):  Temp: 99 °F (37.2 °C) (08/31/23 1437)  Pulse: 77 (08/31/23 1437)  Resp: 17 (08/31/23 1437)  BP: 128/71 (08/31/23 1437)  SpO2: 97 % (08/31/23 1437) Vital Signs (24h Range):  Temp:  [98.2 °F (36.8 °C)-99.1 °F (37.3 °C)] 99 °F (37.2 °C)  Pulse:  [70-89] 77  Resp:  [17-18] 17  SpO2:  [96 %-98 %] 97 %  BP: (116-172)/(71-97) 128/71     Weight: 60.6 kg (133 lb 11.2 oz)  Body mass index is 22.25 kg/m².    Intake/Output Summary (Last 24 hours) at 8/31/2023 1512  Last data filed at 8/31/2023 1337  Gross per 24 hour   Intake 440 ml   Output 3 ml   Net 437 ml         Physical Exam  Vitals and nursing note reviewed.   Constitutional:       Appearance: Normal appearance. She is normal weight.      Comments: Lying in bed   HENT:      Head: Normocephalic and atraumatic.      Mouth/Throat:      Mouth: Mucous membranes are moist.   Cardiovascular:      Rate and Rhythm: Normal rate.      Heart sounds: Murmur heard.   Pulmonary:      Effort: No respiratory distress.      Breath sounds: No stridor. No wheezing.      Comments: On RA  Abdominal:      Palpations: Abdomen is soft.      Tenderness: There is no abdominal tenderness.   Skin:     General: Skin is warm.   Neurological:      Mental Status: She is alert and oriented to person, place, and time.   Psychiatric:         Mood and Affect: Mood  normal.             Significant Labs: BMP:   Recent Labs   Lab 08/31/23  0324   *      K 3.7      CO2 24   BUN 12   CREATININE 0.8   CALCIUM 9.1   MG 2.2     CBC:   Recent Labs   Lab 08/30/23  0504 08/31/23  0324   WBC 5.78 6.30   HGB 14.2 13.2   HCT 41.1 39.7    190     CMP:   Recent Labs   Lab 08/30/23  0504 08/31/23  0324    138   K 3.4* 3.7    109   CO2 22* 24   * 111*   BUN 17 12   CREATININE 0.8 0.8   CALCIUM 9.2 9.1   PROT 6.7  --    ALBUMIN 3.9  --    BILITOT 1.0  --    ALKPHOS 62  --    AST 16  --    ALT 18  --    ANIONGAP 8 5*     Magnesium:   Recent Labs   Lab 08/30/23  0504 08/31/23  0324   MG 1.9 2.2       Significant Imaging: I have reviewed all pertinent imaging results/findings within the past 24 hours.    NM Myocardial Perfusion Spect Multi Pharmacologic    Result Date: 8/31/2023  Myocardial Perfusion Imaging with SPECT Gated acquisition and Ejection Fraction single day protocol CLINICAL INFORMATION:  Chest pain. PROCEDURE: Lexiscan is utilized as a pharmacologic stress agent. At peak stress, 23.5 millicuries of technetium Myoview were administered intravenously.  The rest portion of the study is accomplished on the same day, utilizing 11.0 millicuries of technetium Myoview intravenously. FINDINGS: There is a matched distribution of the tracer activity within the left ventricular myocardium with no findings of pharmacologically induced reversibility. Mildly decreased activity is observed within the inferior/inferolateral wall near the cardiac base.  The chamber size is within normal limits.  There are no wall motion abnormalities and the estimated ejection fraction is 90%. IMPRESSION: 1.  NO FINDINGS OF PHARMACOLOGICALLY INDUCED REVERSIBILITY. 2.  ESTIMATED EJECTION FRACTION OF 90%. Electronically signed by:  Carlos Sy MD  8/31/2023 12:03 PM CDT Workstation: [id number]    Echo    Result Date: 8/30/2023    Left Ventricle: The left ventricle is normal in  size. Mildly increased wall thickness. Normal wall motion. There is normal systolic function with a visually estimated ejection fraction of 65 - 70%.   Left Atrium: Left atrium is moderately dilated.   Right Ventricle: Normal right ventricular cavity size. Wall thickness is normal. Right ventricle wall motion  is normal. Systolic function is normal.   Right Atrium: Right atrium is mildly dilated.   Aortic Valve: The aortic valve is a trileaflet valve. Moderately calcified cusps. Moderately restricted motion. There is moderate stenosis. Aortic valve area by VTI is 2.05 cm². LVOT velocity time integral is 29.90 cm. Aortic valve peak velocity is 3.18 m/s. Mean gradient is 27 mmHg. The dimensionless index is 0.54.   Mitral Valve: Mildly thickened leaflets. Moderate mitral annular calcification.   IVC/SVC: Normal venous pressure at 3 mmHg.     X-Ray Chest AP Portable    Result Date: 8/30/2023  Portable AP chest, 8/30/2023 5:26 AM CDT HISTORY:  82 years old ( 1940 ) Female patient ,  Chest Pain . COMPARISON: No prior studies available for comparison. FINDINGS: The lungs are normally inflated and overall are clear. There is no pulmonary mass, edema, pneumothorax, infiltrate, or effusion. The heart is normal in size. There is no evidence of pulmonary venous congestion. The hilar and mediastinal regions are considered within normal limits. Trachea is midline. The bones are intact with no bony lesions identified. IMPRESSION: 1.  No acute findings. Electronically signed by:  Jose Santoro MD  8/30/2023 6:41 AM CDT Workstation: [id number]

## 2023-08-31 NOTE — TELEPHONE ENCOUNTER
----- Message from Jaelynrose Lopez sent at 8/31/2023  4:01 PM CDT -----  Regarding: sooner apt  Contact: [facility]  Type:  Sooner Appointment Request    Caller is requesting a sooner appointment.  Caller declined first available appointment listed below.  Caller will not accept being placed on the waitlist and is requesting a message be sent to doctor.    Name of Caller:  [facility]  When is the first available appointment?    Symptoms:  hosp f.u dx: ronald     Best Call Back Number:  [phone]    Additional Information:  Needs to be seen within 1 week. Pt is seeking dr and not any nurses.

## 2023-08-31 NOTE — PROGRESS NOTES
[facility] Medicine  Progress Note    Patient Name: Divine Ray  MRN: [id number]  Patient Class: OP- Observation   Admission Date: 8/30/2023  Length of Stay: 0 days  Attending Physician: Jacque Barker MD  Primary Care Provider: Jose Manuel Landrum MD        Subjective:     Principal Problem:Chest pain        HPI:  Ms. Ray is an 82-year-old female who presented to the ED with chest tightness associated with palpitations.  Patient reports she awoke earlier this morning, pounding heartbeat, palpitation associated with chest tightness.  She reports intermittent episode of chest tightness over the last 2 weeks, felt like anxiety attack, lasting 1-2 hours, associated with shortness of breath.  She has known history of severe aortic stenosis, followed by cardiologist Dr. Rivera, states last seen last year and discussion about TAVR but she declined at that time.  States she has had a tight valve since her 20s.  In the ED blood pressure elevated, afebrile, on room air.  Telemetry with frequent PACs/PVCs.  Labs with potassium 3.4, creatinine 0.8, troponin 6.8, BNP 37, EKG reviewed, chest x-ray no focal infiltrates or consolidation.  She received aspirin 325 mg in ED. Case discussed with ED provider who requested admission.  Plan of care discussed with patient, no visitors at bedside.      Overview/Hospital Course:  Patient was admitted with Cardiology consult.  Transthoracic echocardiogram with EF of 65-70% with moderate aortic stenosis.  Nuclear stress test in progress.      Interval History:  No acute overnight events.  No new complaints.  States palpitations not as frequent.  Blood pressure not well controlled overnight, on room air.  Labs with potassium 3.7, magnesium 2.2, creatinine 0.8.  Transthoracic echocardiogram with EF of 65-70% with moderate aortic stenosis.  Nuclear stress test pending.  Discussed with patient.  Await further cardiology recommendations.    Review of Systems    Constitutional:  Negative for fever.   Cardiovascular:  Negative for leg swelling.   Genitourinary:  Negative for difficulty urinating.   Psychiatric/Behavioral:  Negative for confusion.      Objective:     Vital Signs (Most Recent):  Temp: 99 °F (37.2 °C) (08/31/23 1437)  Pulse: 77 (08/31/23 1437)  Resp: 17 (08/31/23 1437)  BP: 128/71 (08/31/23 1437)  SpO2: 97 % (08/31/23 1437) Vital Signs (24h Range):  Temp:  [98.2 °F (36.8 °C)-99.1 °F (37.3 °C)] 99 °F (37.2 °C)  Pulse:  [70-89] 77  Resp:  [17-18] 17  SpO2:  [96 %-98 %] 97 %  BP: (116-172)/(71-97) 128/71     Weight: 60.6 kg (133 lb 11.2 oz)  Body mass index is 22.25 kg/m².    Intake/Output Summary (Last 24 hours) at 8/31/2023 1512  Last data filed at 8/31/2023 1337  Gross per 24 hour   Intake 440 ml   Output 3 ml   Net 437 ml         Physical Exam  Vitals and nursing note reviewed.   Constitutional:       Appearance: Normal appearance. She is normal weight.      Comments: Lying in bed   HENT:      Head: Normocephalic and atraumatic.      Mouth/Throat:      Mouth: Mucous membranes are moist.   Cardiovascular:      Rate and Rhythm: Normal rate.      Heart sounds: Murmur heard.   Pulmonary:      Effort: No respiratory distress.      Breath sounds: No stridor. No wheezing.      Comments: On RA  Abdominal:      Palpations: Abdomen is soft.      Tenderness: There is no abdominal tenderness.   Skin:     General: Skin is warm.   Neurological:      Mental Status: She is alert and oriented to person, place, and time.   Psychiatric:         Mood and Affect: Mood normal.             Significant Labs: BMP:   Recent Labs   Lab 08/31/23  0324   *      K 3.7      CO2 24   BUN 12   CREATININE 0.8   CALCIUM 9.1   MG 2.2     CBC:   Recent Labs   Lab 08/30/23  0504 08/31/23  0324   WBC 5.78 6.30   HGB 14.2 13.2   HCT 41.1 39.7    190     CMP:   Recent Labs   Lab 08/30/23  0504 08/31/23  0324    138   K 3.4* 3.7    109   CO2 22* 24   *  111*   BUN 17 12   CREATININE 0.8 0.8   CALCIUM 9.2 9.1   PROT 6.7  --    ALBUMIN 3.9  --    BILITOT 1.0  --    ALKPHOS 62  --    AST 16  --    ALT 18  --    ANIONGAP 8 5*     Magnesium:   Recent Labs   Lab 08/30/23  0504 08/31/23  0324   MG 1.9 2.2       Significant Imaging: I have reviewed all pertinent imaging results/findings within the past 24 hours.    NM Myocardial Perfusion Spect Multi Pharmacologic    Result Date: 8/31/2023  Myocardial Perfusion Imaging with SPECT Gated acquisition and Ejection Fraction single day protocol CLINICAL INFORMATION:  Chest pain. PROCEDURE: Lexiscan is utilized as a pharmacologic stress agent. At peak stress, 23.5 millicuries of technetium Myoview were administered intravenously.  The rest portion of the study is accomplished on the same day, utilizing 11.0 millicuries of technetium Myoview intravenously. FINDINGS: There is a matched distribution of the tracer activity within the left ventricular myocardium with no findings of pharmacologically induced reversibility. Mildly decreased activity is observed within the inferior/inferolateral wall near the cardiac base.  The chamber size is within normal limits.  There are no wall motion abnormalities and the estimated ejection fraction is 90%. IMPRESSION: 1.  NO FINDINGS OF PHARMACOLOGICALLY INDUCED REVERSIBILITY. 2.  ESTIMATED EJECTION FRACTION OF 90%. Electronically signed by:  Carlos Sy MD  8/31/2023 12:03 PM CDT Workstation: [id number]    Echo    Result Date: 8/30/2023    Left Ventricle: The left ventricle is normal in size. Mildly increased wall thickness. Normal wall motion. There is normal systolic function with a visually estimated ejection fraction of 65 - 70%.   Left Atrium: Left atrium is moderately dilated.   Right Ventricle: Normal right ventricular cavity size. Wall thickness is normal. Right ventricle wall motion  is normal. Systolic function is normal.   Right Atrium: Right atrium is mildly dilated.   Aortic  Valve: The aortic valve is a trileaflet valve. Moderately calcified cusps. Moderately restricted motion. There is moderate stenosis. Aortic valve area by VTI is 2.05 cm². LVOT velocity time integral is 29.90 cm. Aortic valve peak velocity is 3.18 m/s. Mean gradient is 27 mmHg. The dimensionless index is 0.54.   Mitral Valve: Mildly thickened leaflets. Moderate mitral annular calcification.   IVC/SVC: Normal venous pressure at 3 mmHg.     X-Ray Chest AP Portable    Result Date: 8/30/2023  Portable AP chest, 8/30/2023 5:26 AM CDT HISTORY:  82 years old ( 1940 ) Female patient ,  Chest Pain . COMPARISON: No prior studies available for comparison. FINDINGS: The lungs are normally inflated and overall are clear. There is no pulmonary mass, edema, pneumothorax, infiltrate, or effusion. The heart is normal in size. There is no evidence of pulmonary venous congestion. The hilar and mediastinal regions are considered within normal limits. Trachea is midline. The bones are intact with no bony lesions identified. IMPRESSION: 1.  No acute findings. Electronically signed by:  Jose Santoro MD  8/30/2023 6:41 AM CDT Workstation: [id number]         Assessment/Plan:      Active Hospital Problems    Diagnosis    *Chest pain with palpitations    Frequent PAC/PVC    Anxiety    Aortic stenosis    Essential hypertension     Plan:  Continue care on telemetry floor with continuous cardiac monitoring  Transthoracic echocardiogram this admission with EF of 65-70% with moderate aortic stenosis   Await results of EKG and nuclear stress test   Keep potassium greater than 4, magnesium greater than 2   Electrolytes sliding scale repletion  Increase activity and monitor symptomatology  Intermittent lab checks  Await further cardiology recommendations   Further plan as per hospital course    VTE Risk Mitigation (From admission, onward)         Ordered     enoxaparin injection 40 mg  Daily         08/30/23 0824     IP VTE HIGH RISK  PATIENT  Once         08/30/23 0824     Place sequential compression device  Until discontinued         08/30/23 0824                Discharge Planning   BRITTNI: 9/1/2023     Code Status: Full Code   Is the patient medically ready for discharge?:     Reason for patient still in hospital (select all that apply): Consult recommendations  Discharge Plan A: Home                  Jacque Braker MD  Department of Hospital Medicine   [facility]

## 2023-09-01 PROBLEM — R07.9 CHEST PAIN: Status: RESOLVED | Noted: 2023-08-30 | Resolved: 2023-09-01

## 2023-09-01 NOTE — DISCHARGE SUMMARY
[facility] Medicine  Discharge Summary      Patient Name: Divine Ray  MRN: [id number]  ZURDO: [id number]  Patient Class: OP- Observation  Admission Date: 8/30/2023  Hospital Length of Stay: 0 days  Discharge Date and Time: 8/31/2023  4:56 PM  Attending Physician: No att. providers found   Discharging Provider: Jacque Barker MD  Primary Care Provider: Jose Manuel Landrum MD    Primary Care Team: Networked reference to record PCT     HPI:   Ms. Ray is an 82-year-old female who presented to the ED with chest tightness associated with palpitations.  Patient reports she awoke earlier this morning, pounding heartbeat, palpitation associated with chest tightness.  She reports intermittent episode of chest tightness over the last 2 weeks, felt like anxiety attack, lasting 1-2 hours, associated with shortness of breath.  She has known history of severe aortic stenosis, followed by cardiologist Dr. Rivera, states last seen last year and discussion about TAVR but she declined at that time.  States she has had a tight valve since her 20s.  In the ED blood pressure elevated, afebrile, on room air.  Telemetry with frequent PACs/PVCs.  Labs with potassium 3.4, creatinine 0.8, troponin 6.8, BNP 37, EKG reviewed, chest x-ray no focal infiltrates or consolidation.  She received aspirin 325 mg in ED. Case discussed with ED provider who requested admission.  Plan of care discussed with patient, no visitors at bedside.      * No surgery found *      Hospital Course:   Patient was admitted under observation status further evaluation monitoring.  Telemetry with frequent PACs.  Cardiology was consulted.  Transthoracic echocardiogram with EF of 65-70% with moderate aortic stenosis.  Nuclear stress test negative, no reversible ischemia, EF of 90%.  Clinically she felt improved.  Started on low-dose Lopressor 12.5 mg b.i.d..  Patient will follow-up outpatient PCP and Cardiology.  Discharge plan including  medication, follow-up as well as return precautions were reviewed, she expressed understanding, no questions or concerns.  Discussed with Cardiology on day of discharge.      Discharge examination   Sitting side of bed, alert, oriented, on room air       Goals of Care Treatment Preferences:  Code Status: Full Code      Consults:   Consults (From admission, onward)        Status Ordering Provider     Inpatient consult to Cardiology  Once        Provider:  Thomas Thapa MD    Completed TRACEY RIBERA          No new Assessment & Plan notes have been filed under this hospital service since the last note was generated.  Service: Hospital Medicine    Final Active Diagnoses:    Diagnosis Date Noted POA    PRINCIPAL PROBLEM:  Aortic stenosis [I35.0] 08/30/2023 Yes     Chronic    Frequent PAC [I49.1] 08/30/2023 Clinically Undetermined    Anxiety [F41.9] 08/30/2023 Yes     Chronic    Essential hypertension [I10] 04/25/2022 Yes      Problems Resolved During this Admission:    Diagnosis Date Noted Date Resolved POA    Hypokalemia [E87.6] 08/30/2023 08/31/2023 Yes    Chest pain with palpitations [R07.9] 08/30/2023 09/01/2023 Yes       Discharged Condition: good    Disposition: Home or Self Care    Follow Up:   Follow-up Information     Jose Manuel Landrum MD. Schedule an appointment as soon as possible for a visit in 1 week(s).    Specialty: Family Medicine  Why: follow up  Contact information:  [address] [facility] [address]  [phone]             Hermelindo Rivera MD. Schedule an appointment as soon as possible for a visit in 1 week(s).    Specialties: Cardiology, Interventional Cardiology  Why: follow up  Contact information:  [address] [facility] [address]  [phone]                       Patient Instructions:      Diet Cardiac     Notify your health care provider if you experience any of the following:  severe uncontrolled pain     Notify your health care provider if you experience any of  "the following:  persistent dizziness, light-headedness, or visual disturbances     Notify your health care provider if you experience any of the following:  difficulty breathing or increased cough     Activity as tolerated       Significant Diagnostic Studies: Labs:   BMP:   Recent Labs   Lab 08/31/23 0324   *      K 3.7      CO2 24   BUN 12   CREATININE 0.8   CALCIUM 9.1   MG 2.2   , CMP   Recent Labs   Lab 08/31/23 0324      K 3.7      CO2 24   *   BUN 12   CREATININE 0.8   CALCIUM 9.1   ANIONGAP 5*   , CBC   Recent Labs   Lab 08/31/23 0324   WBC 6.30   HGB 13.2   HCT 39.7      , INR No results found for: "INR", "PROTIME", Lipid Panel   Lab Results   Component Value Date    CHOL 211 (H) 04/14/2022    HDL 75 04/14/2022    LDLCALC 118 (H) 04/14/2022    TRIG 84 04/14/2022    CHOLHDL 2.8 04/14/2022   , Troponin No results for input(s): "TROPONINI" in the last 168 hours. and A1C: No results for input(s): "HGBA1C" in the last 4320 hours.    Pending Diagnostic Studies:     None       Nuclear Stress Test    Result Date: 8/31/2023    The ECG portion of the study is negative for ischemia.   The patient reported no chest pain during the stress test.   During stress, rare PACs are noted. , During stress, rare PVCs are noted.   The nuclear portion of this study will be reported separately.     NM Myocardial Perfusion Spect Multi Pharmacologic    Result Date: 8/31/2023  Myocardial Perfusion Imaging with SPECT Gated acquisition and Ejection Fraction single day protocol CLINICAL INFORMATION:  Chest pain. PROCEDURE: Lexiscan is utilized as a pharmacologic stress agent. At peak stress, 23.5 millicuries of technetium Myoview were administered intravenously.  The rest portion of the study is accomplished on the same day, utilizing 11.0 millicuries of technetium Myoview intravenously. FINDINGS: There is a matched distribution of the tracer activity within the left ventricular " myocardium with no findings of pharmacologically induced reversibility. Mildly decreased activity is observed within the inferior/inferolateral wall near the cardiac base.  The chamber size is within normal limits.  There are no wall motion abnormalities and the estimated ejection fraction is 90%. IMPRESSION: 1.  NO FINDINGS OF PHARMACOLOGICALLY INDUCED REVERSIBILITY. 2.  ESTIMATED EJECTION FRACTION OF 90%. Electronically signed by:  Carlos Sy MD  8/31/2023 12:03 PM CDT Workstation: [id number]    Echo    Result Date: 8/30/2023    Left Ventricle: The left ventricle is normal in size. Mildly increased wall thickness. Normal wall motion. There is normal systolic function with a visually estimated ejection fraction of 65 - 70%.   Left Atrium: Left atrium is moderately dilated.   Right Ventricle: Normal right ventricular cavity size. Wall thickness is normal. Right ventricle wall motion  is normal. Systolic function is normal.   Right Atrium: Right atrium is mildly dilated.   Aortic Valve: The aortic valve is a trileaflet valve. Moderately calcified cusps. Moderately restricted motion. There is moderate stenosis. Aortic valve area by VTI is 2.05 cm². LVOT velocity time integral is 29.90 cm. Aortic valve peak velocity is 3.18 m/s. Mean gradient is 27 mmHg. The dimensionless index is 0.54.   Mitral Valve: Mildly thickened leaflets. Moderate mitral annular calcification.   IVC/SVC: Normal venous pressure at 3 mmHg.     X-Ray Chest AP Portable    Result Date: 8/30/2023  Portable AP chest, 8/30/2023 5:26 AM CDT HISTORY:  82 years old ( 1940 ) Female patient ,  Chest Pain . COMPARISON: No prior studies available for comparison. FINDINGS: The lungs are normally inflated and overall are clear. There is no pulmonary mass, edema, pneumothorax, infiltrate, or effusion. The heart is normal in size. There is no evidence of pulmonary venous congestion. The hilar and mediastinal regions are considered within normal  limits. Trachea is midline. The bones are intact with no bony lesions identified. IMPRESSION: 1.  No acute findings. Electronically signed by:  Jose Santoro MD  8/30/2023 6:41 AM CDT Workstation: [id number]    Medications:  Reconciled Home Medications:      Medication List      START taking these medications    metoprolol tartrate 25 MG tablet  Commonly known as: LOPRESSOR  Take 0.5 tablets (12.5 mg total) by mouth 2 (two) times daily.        CONTINUE taking these medications    amLODIPine 5 MG tablet  Commonly known as: NORVASC  Take 1 tablet (5 mg total) by mouth once daily.     CALTRATE-600 PLUS VITAMIN D3 ORAL  Take 1 tablet by mouth once daily.     docusate sodium 250 MG capsule  Commonly known as: COLACE  Take 250 mg by mouth once daily.     hydrOXYzine HCL 25 MG tablet  Commonly known as: ATARAX  Take 1 tablet (25 mg total) by mouth 3 (three) times daily as needed for Itching.     losartan 100 MG tablet  Commonly known as: COZAAR  Take 1 tablet (100 mg total) by mouth once daily.     sertraline 25 MG tablet  Commonly known as: ZOLOFT  Take 1 tablet (25 mg total) by mouth once daily.        STOP taking these medications    BENEFIBER HEALTHY SHAPE 5 gram/7.4 gram Powd  Generic drug: wheat dextrin     clobetasol 0.05% 0.05 % Oint  Commonly known as: TEMOVATE     levocetirizine 5 MG tablet  Commonly known as: XYZAL     triamcinolone acetonide 0.1% 0.1 % cream  Commonly known as: KENALOG            Indwelling Lines/Drains at time of discharge:   Lines/Drains/Airways     None                 Time spent on the discharge of patient: 32 minutes         Jacque Barker MD  Department of Hospital Medicine  [facility]

## 2023-09-05 ENCOUNTER — TELEPHONE (OUTPATIENT)
Dept: FAMILY MEDICINE | Facility: CLINIC | Age: 83
End: 2023-09-05
Payer: MEDICARE

## 2023-09-05 NOTE — TELEPHONE ENCOUNTER
Hospital follow up appointment scheduled for the date of 9-12-23. Patient agreed to appointment date, time, and location.  Appointment also added to wait list for possible earlier access.

## 2023-09-05 NOTE — TELEPHONE ENCOUNTER
Sanam Grimaldoig Staff    ----- Message from Sanam Grimaldo sent at 9/5/2023  1:51 PM CDT -----  Regarding: hospital follow up  Contact: pt  Type:  Sooner Apoointment Request    Caller is requesting a sooner appointment.  Caller declined first available appointment listed below.  Caller will not accept being placed on the waitlist and is requesting a message be sent to doctor.  Name of Caller:pt    When is the first available appointment?9/12/23    Symptoms:nervousness stomach, jittery    Would the patient rather a call back or a response via [facility]? Call back    Best Call Back Number:[phone]    Additional Information: sts she needs a hospital follow up and she still isn't feeling as she should---please advise and thank you

## 2023-09-06 ENCOUNTER — TELEPHONE (OUTPATIENT)
Dept: FAMILY MEDICINE | Facility: CLINIC | Age: 83
End: 2023-09-06
Payer: MEDICARE

## 2023-09-06 NOTE — TELEPHONE ENCOUNTER
----- Message from Laura Shireenracielcorinna sent at 9/6/2023  9:14 AM CDT -----  Type:  Needs Medical Advice    Who Called: pt    Symptoms (please be specific): makes stomach hurt, she has the shakes, feel nervous and nervous stomach, also doesn't have an appetite    How long has patient had these symptoms:  2 days     Pharmacy [facility] and phone #:    [id number] [place] - [address] [facility]  [address] [address]  Phone: [phone] Fax: [phone]        Would the patient rather a call back or a response via [facility]? Call back    Best Call Back Number: [phone]      Additional Information: pt wants to speak with someone in the office about the medication she just got. She thinks that is what is making her not feel well. Would like to be put on something else of that is the case.       Please call Back to advise. Thanks!

## 2023-09-06 NOTE — TELEPHONE ENCOUNTER
Patient states that she was prescribed Sertraline 25mg by ricardo and Metoprolol by the ER doctor. She states that since starting medication she has had the following symptoms:   causing her to not eat and lose weight. She states she has a very upset stomach (nervous stomach), shakes, dry mouth

## 2023-09-07 ENCOUNTER — TELEPHONE (OUTPATIENT)
Dept: FAMILY MEDICINE | Facility: CLINIC | Age: 83
End: 2023-09-07
Payer: MEDICARE

## 2023-09-07 NOTE — TELEPHONE ENCOUNTER
----- Message from Meron Sandip sent at 9/7/2023 10:00 AM CDT -----  Regarding: advice  Contact: patient  Type: Needs Medical Advice  Who Called:  patient  Symptoms (please be specific):  nervous stomach  How long has patient had these symptoms:    Pharmacy name and phone #:    [id number] - [place] - [address] AT [facility] [address]  [facility] [address]  Phone: [phone] Fax: [phone]      Best Call Back Number: [phone] (home)     Additional Information: Please call patient to advise.  Thanks!

## 2023-09-08 NOTE — TELEPHONE ENCOUNTER
Tell patient to stop the Zoloft -take the hydroxyzine for the anxiety. I call patient -left a message

## 2023-09-12 ENCOUNTER — OFFICE VISIT (OUTPATIENT)
Dept: FAMILY MEDICINE | Facility: CLINIC | Age: 83
End: 2023-09-12
Payer: MEDICARE

## 2023-09-12 VITALS
WEIGHT: 133.38 LBS | RESPIRATION RATE: 18 BRPM | HEART RATE: 82 BPM | DIASTOLIC BLOOD PRESSURE: 74 MMHG | SYSTOLIC BLOOD PRESSURE: 122 MMHG | OXYGEN SATURATION: 97 % | HEIGHT: 65 IN | BODY MASS INDEX: 22.22 KG/M2

## 2023-09-12 DIAGNOSIS — I49.1 PAC (PREMATURE ATRIAL CONTRACTION): ICD-10-CM

## 2023-09-12 DIAGNOSIS — F41.9 ANXIETY: Chronic | ICD-10-CM

## 2023-09-12 DIAGNOSIS — I10 ESSENTIAL HYPERTENSION: ICD-10-CM

## 2023-09-12 DIAGNOSIS — Z09 HOSPITAL DISCHARGE FOLLOW-UP: Primary | ICD-10-CM

## 2023-09-12 DIAGNOSIS — I35.0 MODERATE TO SEVERE AORTIC STENOSIS: ICD-10-CM

## 2023-09-12 DIAGNOSIS — R07.89 CHEST TIGHTNESS: ICD-10-CM

## 2023-09-12 PROCEDURE — 1101F PR PT FALLS ASSESS DOC 0-1 FALLS W/OUT INJ PAST YR: ICD-10-PCS | Mod: CPTII,S$GLB,, | Performed by: STUDENT IN AN ORGANIZED HEALTH CARE EDUCATION/TRAINING PROGRAM

## 2023-09-12 PROCEDURE — 1159F PR MEDICATION LIST DOCUMENTED IN MEDICAL RECORD: ICD-10-PCS | Mod: CPTII,S$GLB,, | Performed by: STUDENT IN AN ORGANIZED HEALTH CARE EDUCATION/TRAINING PROGRAM

## 2023-09-12 PROCEDURE — 1159F MED LIST DOCD IN RCRD: CPT | Mod: CPTII,S$GLB,, | Performed by: STUDENT IN AN ORGANIZED HEALTH CARE EDUCATION/TRAINING PROGRAM

## 2023-09-12 PROCEDURE — 3078F PR MOST RECENT DIASTOLIC BLOOD PRESSURE < 80 MM HG: ICD-10-PCS | Mod: CPTII,S$GLB,, | Performed by: STUDENT IN AN ORGANIZED HEALTH CARE EDUCATION/TRAINING PROGRAM

## 2023-09-12 PROCEDURE — 1160F RVW MEDS BY RX/DR IN RCRD: CPT | Mod: CPTII,S$GLB,, | Performed by: STUDENT IN AN ORGANIZED HEALTH CARE EDUCATION/TRAINING PROGRAM

## 2023-09-12 PROCEDURE — 1101F PT FALLS ASSESS-DOCD LE1/YR: CPT | Mod: CPTII,S$GLB,, | Performed by: STUDENT IN AN ORGANIZED HEALTH CARE EDUCATION/TRAINING PROGRAM

## 2023-09-12 PROCEDURE — 3288F PR FALLS RISK ASSESSMENT DOCUMENTED: ICD-10-PCS | Mod: CPTII,S$GLB,, | Performed by: STUDENT IN AN ORGANIZED HEALTH CARE EDUCATION/TRAINING PROGRAM

## 2023-09-12 PROCEDURE — 1160F PR REVIEW ALL MEDS BY PRESCRIBER/CLIN PHARMACIST DOCUMENTED: ICD-10-PCS | Mod: CPTII,S$GLB,, | Performed by: STUDENT IN AN ORGANIZED HEALTH CARE EDUCATION/TRAINING PROGRAM

## 2023-09-12 PROCEDURE — 99214 PR OFFICE/OUTPT VISIT, EST, LEVL IV, 30-39 MIN: ICD-10-PCS | Mod: S$GLB,,, | Performed by: STUDENT IN AN ORGANIZED HEALTH CARE EDUCATION/TRAINING PROGRAM

## 2023-09-12 PROCEDURE — 99999 PR PBB SHADOW E&M-EST. PATIENT-LVL III: CPT | Mod: PBBFAC,,, | Performed by: STUDENT IN AN ORGANIZED HEALTH CARE EDUCATION/TRAINING PROGRAM

## 2023-09-12 PROCEDURE — 3074F SYST BP LT 130 MM HG: CPT | Mod: CPTII,S$GLB,, | Performed by: STUDENT IN AN ORGANIZED HEALTH CARE EDUCATION/TRAINING PROGRAM

## 2023-09-12 PROCEDURE — 99214 OFFICE O/P EST MOD 30 MIN: CPT | Mod: S$GLB,,, | Performed by: STUDENT IN AN ORGANIZED HEALTH CARE EDUCATION/TRAINING PROGRAM

## 2023-09-12 PROCEDURE — 3288F FALL RISK ASSESSMENT DOCD: CPT | Mod: CPTII,S$GLB,, | Performed by: STUDENT IN AN ORGANIZED HEALTH CARE EDUCATION/TRAINING PROGRAM

## 2023-09-12 PROCEDURE — 3078F DIAST BP <80 MM HG: CPT | Mod: CPTII,S$GLB,, | Performed by: STUDENT IN AN ORGANIZED HEALTH CARE EDUCATION/TRAINING PROGRAM

## 2023-09-12 PROCEDURE — 1126F AMNT PAIN NOTED NONE PRSNT: CPT | Mod: CPTII,S$GLB,, | Performed by: STUDENT IN AN ORGANIZED HEALTH CARE EDUCATION/TRAINING PROGRAM

## 2023-09-12 PROCEDURE — 99999 PR PBB SHADOW E&M-EST. PATIENT-LVL III: ICD-10-PCS | Mod: PBBFAC,,, | Performed by: STUDENT IN AN ORGANIZED HEALTH CARE EDUCATION/TRAINING PROGRAM

## 2023-09-12 PROCEDURE — 1126F PR PAIN SEVERITY QUANTIFIED, NO PAIN PRESENT: ICD-10-PCS | Mod: CPTII,S$GLB,, | Performed by: STUDENT IN AN ORGANIZED HEALTH CARE EDUCATION/TRAINING PROGRAM

## 2023-09-12 PROCEDURE — 3074F PR MOST RECENT SYSTOLIC BLOOD PRESSURE < 130 MM HG: ICD-10-PCS | Mod: CPTII,S$GLB,, | Performed by: STUDENT IN AN ORGANIZED HEALTH CARE EDUCATION/TRAINING PROGRAM

## 2023-09-12 NOTE — PROGRESS NOTES
"[facility] MEDICINE CLINIC NOTE    Patient Name: Divine Ray  YOB: 1940    PRESENTING HISTORY     History of Present Illness:  Ms. Divine Ray is a 82 y.o. female here for hospital f/u.     Seen by Max Reynoso for anxiety.   Anxious about many different things. Hurricances, her garden etc.   Gives her a nervous stomach.   Had issues in childhood. Had to drop out of dancing classes due to anxiety.     Tried sertraline, hydroxyzine. Took for about 1 day. Felt like she felt worse.     Then went to ED at 2AM with chest tightness. Minimal palpitations.   W/u included Echo (improvement in AS compared to previous), nuclear stress.   Lytes, TSH WNL.   Started on metoprolol in hospital, felt lightheaded so she stopped it.     Has chronically been on losartan 100mg and amlodipine 5mg with good HTN control.       ROS      OBJECTIVE:   Vital Signs:  Vitals:    09/12/23 0948   BP: 122/74   Pulse: 82   Resp: 18   SpO2: 97%   Weight: 60.5 kg (133 lb 6.1 oz)   Height: 5' 5" (1.651 m)          Physical Exam: Well appearing    Physical Exam    ASSESSMENT & PLAN:     Hospital discharge follow-up    Chest tightness  -     Cancel: Cardiac Monitor - 3-15 Day Adult (Cupid Only); Future  Discussed cardiac monitor to evaluate for occult arrhthymias given dilated atria on echo. She feels this will make her more anxious and she declines.     Frequent PAC    Moderate to severe aortic stenosis    Essential hypertension    Anxiety       Try this:   Hold amlodipine and replace with metoprolol.   See how you feel.       If you want to try something for anxiety I would recommend one of the following:  Therapy  Or sertraline once daily. I don't recommend starting/stopping this medicine. Either take or don't take      I would recommend wearing a heart monitor to see if you are having abnormal heart rhythms.       Jose Manuel Landrum MD   Internal Medicine    I spent a total of 35 minutes on the day of the visit.  This " includes face-to-face time and non face-to-face time preparing to see the patient (e.g., review of tests) , obtaining and/or reviewing separately obtain history, documenting clinical information in the electronic or other health record, independently interpreting results and communicating results to the patient/family/caregiver, or care coordinator.

## 2023-09-12 NOTE — TELEPHONE ENCOUNTER
Called patient and advised of medication questions. Answered all questions and concerns.   Patient stated she seen Dr Landrum today. Verbalized understanding.

## 2023-09-12 NOTE — TELEPHONE ENCOUNTER
"Called patient to advise per Mrs Reynoso    " Tell patient to stop the Zoloft -take the hydroxyzine for the anxiety. I call patient -left a message "    No answer, left voicemail to return call.  "

## 2023-09-12 NOTE — PATIENT INSTRUCTIONS
Try this:   Hold amlodipine and replace with metoprolol.   See how you feel.       If you want to try something for anxiety I would recommend one of the following:  Therapy  Or sertraline once daily. I don't recommend starting/stopping this medicine. Either take or don't take      I would recommend wearing a heart monitor to see if you are having abnormal heart rhythms.             Divine,     If you are due for any health screening(s) below please notify me so we can arrange them to be ordered and scheduled to maintain your health. Most healthy patients complete it. Don't lose out on improving your health.     All of your core healthy metrics are met.

## 2023-10-02 ENCOUNTER — TELEPHONE (OUTPATIENT)
Dept: FAMILY MEDICINE | Facility: CLINIC | Age: 83
End: 2023-10-02
Payer: MEDICARE

## 2023-10-02 DIAGNOSIS — Z12.31 ENCOUNTER FOR SCREENING MAMMOGRAM FOR MALIGNANT NEOPLASM OF BREAST: Primary | ICD-10-CM

## 2023-10-02 NOTE — TELEPHONE ENCOUNTER
Mammo ordered and scheduled with pt via phone.     ----- Message from Selena Hall sent at 10/2/2023  2:32 PM CDT -----  Type:  Mammogram    Caller is requesting to schedule their annual mammogram appointment.  Order is not listed in EPIC.  Please enter order and contact patient to schedule.    Name of Caller:  Pt    Where would they like the mammogram performed?  [facility]    Would the patient rather a call back or a response via [facility]?  Call back    Best Call Back Number:  [phone]    Additional Information:   Please call back to advise. Thanks!

## 2023-10-14 DIAGNOSIS — I10 ESSENTIAL HYPERTENSION: ICD-10-CM

## 2023-10-14 NOTE — TELEPHONE ENCOUNTER
No care due was identified.  [facility] Embedded Care Due Messages. Reference number: [id number].   10/14/2023 3:27:37 PM CDT

## 2023-10-16 ENCOUNTER — OFFICE VISIT (OUTPATIENT)
Dept: CARDIOLOGY | Facility: CLINIC | Age: 83
End: 2023-10-16
Payer: MEDICARE

## 2023-10-16 ENCOUNTER — TELEPHONE (OUTPATIENT)
Dept: CARDIOLOGY | Facility: CLINIC | Age: 83
End: 2023-10-16

## 2023-10-16 VITALS
BODY MASS INDEX: 22.53 KG/M2 | HEART RATE: 64 BPM | WEIGHT: 135.38 LBS | DIASTOLIC BLOOD PRESSURE: 81 MMHG | OXYGEN SATURATION: 98 % | SYSTOLIC BLOOD PRESSURE: 138 MMHG

## 2023-10-16 DIAGNOSIS — I35.0 AORTIC VALVE STENOSIS, ETIOLOGY OF CARDIAC VALVE DISEASE UNSPECIFIED: ICD-10-CM

## 2023-10-16 DIAGNOSIS — I49.3 FREQUENT PVCS: ICD-10-CM

## 2023-10-16 DIAGNOSIS — I35.0 SEVERE AORTIC STENOSIS: Primary | Chronic | ICD-10-CM

## 2023-10-16 DIAGNOSIS — I35.0 MODERATE TO SEVERE AORTIC STENOSIS: ICD-10-CM

## 2023-10-16 DIAGNOSIS — I10 ESSENTIAL HYPERTENSION: ICD-10-CM

## 2023-10-16 DIAGNOSIS — I49.1 PAC (PREMATURE ATRIAL CONTRACTION): ICD-10-CM

## 2023-10-16 PROCEDURE — 3288F FALL RISK ASSESSMENT DOCD: CPT | Mod: CPTII,S$GLB,, | Performed by: GENERAL PRACTICE

## 2023-10-16 PROCEDURE — 1159F MED LIST DOCD IN RCRD: CPT | Mod: CPTII,S$GLB,, | Performed by: GENERAL PRACTICE

## 2023-10-16 PROCEDURE — 3288F PR FALLS RISK ASSESSMENT DOCUMENTED: ICD-10-PCS | Mod: CPTII,S$GLB,, | Performed by: GENERAL PRACTICE

## 2023-10-16 PROCEDURE — 1101F PT FALLS ASSESS-DOCD LE1/YR: CPT | Mod: CPTII,S$GLB,, | Performed by: GENERAL PRACTICE

## 2023-10-16 PROCEDURE — 3079F PR MOST RECENT DIASTOLIC BLOOD PRESSURE 80-89 MM HG: ICD-10-PCS | Mod: CPTII,S$GLB,, | Performed by: GENERAL PRACTICE

## 2023-10-16 PROCEDURE — 1160F RVW MEDS BY RX/DR IN RCRD: CPT | Mod: CPTII,S$GLB,, | Performed by: GENERAL PRACTICE

## 2023-10-16 PROCEDURE — 1159F PR MEDICATION LIST DOCUMENTED IN MEDICAL RECORD: ICD-10-PCS | Mod: CPTII,S$GLB,, | Performed by: GENERAL PRACTICE

## 2023-10-16 PROCEDURE — 99999 PR PBB SHADOW E&M-EST. PATIENT-LVL III: ICD-10-PCS | Mod: PBBFAC,,, | Performed by: GENERAL PRACTICE

## 2023-10-16 PROCEDURE — 99214 OFFICE O/P EST MOD 30 MIN: CPT | Mod: S$GLB,,, | Performed by: GENERAL PRACTICE

## 2023-10-16 PROCEDURE — 3079F DIAST BP 80-89 MM HG: CPT | Mod: CPTII,S$GLB,, | Performed by: GENERAL PRACTICE

## 2023-10-16 PROCEDURE — 93000 EKG 12-LEAD: ICD-10-PCS | Mod: S$GLB,,, | Performed by: GENERAL PRACTICE

## 2023-10-16 PROCEDURE — 99214 PR OFFICE/OUTPT VISIT, EST, LEVL IV, 30-39 MIN: ICD-10-PCS | Mod: S$GLB,,, | Performed by: GENERAL PRACTICE

## 2023-10-16 PROCEDURE — 93000 ELECTROCARDIOGRAM COMPLETE: CPT | Mod: S$GLB,,, | Performed by: GENERAL PRACTICE

## 2023-10-16 PROCEDURE — 3075F SYST BP GE 130 - 139MM HG: CPT | Mod: CPTII,S$GLB,, | Performed by: GENERAL PRACTICE

## 2023-10-16 PROCEDURE — 99999 PR PBB SHADOW E&M-EST. PATIENT-LVL III: CPT | Mod: PBBFAC,,, | Performed by: GENERAL PRACTICE

## 2023-10-16 PROCEDURE — 3075F PR MOST RECENT SYSTOLIC BLOOD PRESS GE 130-139MM HG: ICD-10-PCS | Mod: CPTII,S$GLB,, | Performed by: GENERAL PRACTICE

## 2023-10-16 PROCEDURE — 1101F PR PT FALLS ASSESS DOC 0-1 FALLS W/OUT INJ PAST YR: ICD-10-PCS | Mod: CPTII,S$GLB,, | Performed by: GENERAL PRACTICE

## 2023-10-16 PROCEDURE — 1160F PR REVIEW ALL MEDS BY PRESCRIBER/CLIN PHARMACIST DOCUMENTED: ICD-10-PCS | Mod: CPTII,S$GLB,, | Performed by: GENERAL PRACTICE

## 2023-10-16 RX ORDER — LOSARTAN POTASSIUM 100 MG/1
100 TABLET ORAL
Qty: 90 TABLET | Refills: 3 | Status: SHIPPED | OUTPATIENT
Start: 2023-10-16 | End: 2023-12-14 | Stop reason: SDUPTHER

## 2023-10-16 NOTE — PROGRESS NOTES
Subjective:    Patient ID:  Divine Ray is a 82 y.o. female who presents for evaluation of   Chief Complaint   Patient presents with    Establish Care     Chest tightness pt believes she had a panic attack   Stress/echo       HPI:  REFERRED BY DR. DAVALOS    HE PRESENTED TO THE HOSPITAL with chest pain which occurred 1 night.  She thinks she was wearing about her children's financial issues caused her to be upset.  She had noninvasive testing and was discharged home and is now referred here to evaluate aortic stenosis.  She was placed on metoprolol for palpitations.  She thinks it makes her tired is only taking once a day.  She is had a murmur for the whole life since she was a child.  She stays fairly active is able to walk 3 blocks without any shortness of breath chest pain or syncope.      8/31/23      Left Ventricle: The left ventricle is normal in size. Mildly increased wall thickness. Normal wall motion. There is normal systolic function with a visually estimated ejection fraction of 65 - 70%.    Left Atrium: Left atrium is moderately dilated.    Right Ventricle: Normal right ventricular cavity size. Wall thickness is normal. Right ventricle wall motion  is normal. Systolic function is normal.    Right Atrium: Right atrium is mildly dilated.    Aortic Valve: The aortic valve is a trileaflet valve. Moderately calcified cusps. Moderately restricted motion. There is moderate stenosis. Aortic valve area by VTI is 2.05 cm². LVOT velocity time integral is 29.90 cm. Aortic valve peak velocity is 3.18 m/s. Mean gradient is 27 mmHg. The dimensionless index is 0.54.    Mitral Valve: Mildly thickened leaflets. Moderate mitral annular calcification.    IVC/SVC: Normal venous pressure at 3 mmHg.         8/30/23    IMPRESSION:     1.  NO FINDINGS OF PHARMACOLOGICALLY INDUCED REVERSIBILITY.     2.  ESTIMATED EJECTION FRACTION OF 90%.          8/7/19  Left ventricular systolic function. The estimated ejection fraction is  55%  Normal right ventricular systolic function.  Moderate left atrial enlargement.  Moderate-to-severe aortic valve stenosis.  Aortic valve area is 1.11 cm2; peak velocity is 2.88 m/s; mean gradient is 18 mmHg.  Mild mitral regurgitation.  Mild to moderate tricuspid regurgitation.           Review of patient's allergies indicates:   Allergen Reactions    Anesthesia s/i-40 (propofol) [propofol] Nausea And Vomiting       Past Medical History:   Diagnosis Date    Aortic valve stenosis 2019 TRISTIN- Dr Rivera    Asymptomatic - since age 21    Hypertension      Past Surgical History:   Procedure Laterality Date    BREAST BIOPSY      Benign, calcification    HYSTERECTOMY      TONSILLECTOMY       Social History     Tobacco Use    Smoking status: Never    Smokeless tobacco: Never   Substance Use Topics    Alcohol use: Not Currently    Drug use: Never     Family History   Problem Relation Age of Onset    Heart disease Mother     Hypertension Father     Psoriasis Neg Hx     Lupus Neg Hx     Eczema Neg Hx     Melanoma Neg Hx         Review of Systems:   Constitution: Negative for diaphoresis and fever.   HEENT: Negative for nosebleeds.    Cardiovascular: Negative for chest pain       No dyspnea on exertion       No leg swelling        No palpitations  Respiratory: Negative for shortness of breath and wheezing.    Hematologic/Lymphatic: Negative for bleeding problem. Does not bruise/bleed easily.   Skin: Negative for color change and rash.   Musculoskeletal: Negative for falls and myalgias.   Gastrointestinal: Negative for hematemesis and hematochezia.   Genitourinary: Negative for hematuria.   Neurological: Negative for dizziness and light-headedness.   Psychiatric/Behavioral: Negative for altered mental status and memory loss.          Objective:        Vitals:    10/16/23 1401   BP: 138/81   Pulse:        Lab Results   Component Value Date    WBC 6.30 08/31/2023    HGB 13.2 08/31/2023    HCT 39.7 08/31/2023      08/31/2023    CHOL 211 (H) 04/14/2022    TRIG 84 04/14/2022    HDL 75 04/14/2022    ALT 18 08/30/2023    AST 16 08/30/2023     08/31/2023    K 3.7 08/31/2023     08/31/2023    CREATININE 0.8 08/31/2023    BUN 12 08/31/2023    CO2 24 08/31/2023    TSH 1.860 08/30/2023        ECHOCARDIOGRAM RESULTS  Results for orders placed during the hospital encounter of 08/30/23    Echo    Interpretation Summary    Left Ventricle: The left ventricle is normal in size. Mildly increased wall thickness. Normal wall motion. There is normal systolic function with a visually estimated ejection fraction of 65 - 70%.    Left Atrium: Left atrium is moderately dilated.    Right Ventricle: Normal right ventricular cavity size. Wall thickness is normal. Right ventricle wall motion  is normal. Systolic function is normal.    Right Atrium: Right atrium is mildly dilated.    Aortic Valve: The aortic valve is a trileaflet valve. Moderately calcified cusps. Moderately restricted motion. There is moderate stenosis. Aortic valve area by VTI is 2.05 cm². LVOT velocity time integral is 29.90 cm. Aortic valve peak velocity is 3.18 m/s. Mean gradient is 27 mmHg. The dimensionless index is 0.54.    Mitral Valve: Mildly thickened leaflets. Moderate mitral annular calcification.    IVC/SVC: Normal venous pressure at 3 mmHg.        CURRENT/PREVIOUS VISIT EKG  Results for orders placed or performed during the hospital encounter of 08/30/23   EKG 12-lead    Collection Time: 08/30/23  4:55 AM    Narrative    Test Reason : R07.9,    Vent. Rate : 086 BPM     Atrial Rate : 086 BPM     P-R Int : 150 ms          QRS Dur : 086 ms      QT Int : 406 ms       P-R-T Axes : 063 025 055 degrees     QTc Int : 485 ms    Normal sinus rhythm  Possible Left atrial enlargement  Nonspecific ST abnormality  Abnormal ECG  No previous ECGs available  Confirmed by Jose Moody MD ([id number]) on 8/30/2023 12:45:24 PM    Referred By:             Confirmed By:Jose Moody  MD     No valid procedures specified.   Results for orders placed during the hospital encounter of 08/30/23    Nuclear Stress Test    Interpretation Summary    The ECG portion of the study is negative for ischemia.    The patient reported no chest pain during the stress test.    During stress, rare PACs are noted. , During stress, rare PVCs are noted.    The nuclear portion of this study will be reported separately.      Physical Exam:  CONSTITUTIONAL: No fever, no chills  HEENT: Normocephalic, atraumatic,pupils reactive to light                 NECK:  No JVD no carotid bruit  CVS: S1S2+, RRR, HARSH CRESCENDO DECRESCENDO MURMUR HIGH-PITCHED 2ND RIGHT AND LEFT INTERCOSTAL SPACE LATE-PEAKING  LUNGS: Clear  ABDOMEN: Soft, NT, BS+  EXTREMITIES: No cyanosis, edema  : No coto catheter  NEURO: AAO X 3  PSY: Normal affect      Medication List with Changes/Refills   Current Medications    LOSARTAN (COZAAR) 100 MG TABLET    TAKE 1 TABLET(100 MG) BY MOUTH EVERY DAY    METOPROLOL TARTRATE (LOPRESSOR) 25 MG TABLET    Take 0.5 tablets (12.5 mg total) by mouth 2 (two) times daily.             Assessment:       1. Aortic stenosis    2. Aortic valve stenosis, etiology of cardiac valve disease unspecified    3. Moderate to severe aortic stenosis    4. Essential hypertension    5. Frequent PAC    6. Frequent PVCs         Plan:     Problem List Items Addressed This Visit          Cardiac/Vascular    Aortic stenosis - Primary (Chronic)    Essential hypertension    Moderate to severe aortic stenosis    Relevant Orders    Echo Saline Bubble? No    Frequent PAC     Other Visit Diagnoses       Aortic valve stenosis, etiology of cardiac valve disease unspecified        Relevant Orders    IN OFFICE EKG 12-LEAD (to Muse)    Cardiac Monitor - 3-15 Day Adult (Cupid Only)    Echo Saline Bubble? No    Frequent PVCs        Relevant Orders    Cardiac Monitor - 3-15 Day Adult (Cupid Only)    Echo Saline Bubble? No          EKG today shows sinus  rhythm frequent PVCs    Her echo was reviewed.  She is had mild aortic stenosis and 19 and gotten gradually a little bit worse but is not ready be fixed.  Murmurs out of proportion to the Cardene on the echo but she is asymptomatic.  I am going to repeat the echo in 6 months or sooner if she has any symptoms.  I recommend to continue the metoprolol now because she has frequent PVCs will place a heart monitor to evaluate the PVCs.  She will report in any change of symptoms, may require further evaluation sooner  Follow up in about 6 months (around 4/16/2024).    The patients questions were answered, they verbalized understanding, and agreed with the treatment plan.     ESME MARIN MD  [facility]

## 2023-10-16 NOTE — TELEPHONE ENCOUNTER
Refill Decision Note   Divine Cory  is requesting a refill authorization.  Brief Assessment and Rationale for Refill:  Approve     Medication Therapy Plan:         Comments:     Note composed:3:48 AM 10/16/2023

## 2023-10-16 NOTE — TELEPHONE ENCOUNTER
----- Message from Lesly Elmore sent at 10/16/2023  4:42 PM CDT -----  Contact: Self  Pt is calling in regards to the Echo that is scheduled on 03/13/24, states she cannot do that day due to other commitments. She is needing to reschedule the Echo and see about scheduling the f/u 2 wks later with Dr Thapa. Can we please call pt back to reschedule at [phone]. Thank You.

## 2023-10-24 ENCOUNTER — TELEPHONE (OUTPATIENT)
Dept: FAMILY MEDICINE | Facility: CLINIC | Age: 83
End: 2023-10-24
Payer: MEDICARE

## 2023-10-24 DIAGNOSIS — I10 ESSENTIAL HYPERTENSION: Primary | ICD-10-CM

## 2023-10-24 NOTE — TELEPHONE ENCOUNTER
"Patient states she was placed on Metoprolol Tartrate during a hospital encounter.  States she also takes Losartan. Patient states she previously took Amlodipine which was discontinued by Dr. Landrum when order for Metoprolol Tartrate was placed.  Patient states Metoprolol Tartrate order is 25 mg take 1/2 tablet twice daily.  Patient states she only takes 1/2 tablet once daily as when taken twice daily she experiences lightheadedness and a queasy feeling.  Patient states when she takes 1/2 tablet once daily she feels fine.  Patient requesting to know if Dr. Landrum would like for her to continue taking Metoprolol Tartrate or if he would like for her to return to taking Amlodipine.  Patient was unable to provide any blood pressure readings for review.  Patient states "I haven't checked my blood pressure in a few days."   Patient needs prescription sent to pharmacy for whichever medication patient should continue. Please advise. Thank you.               "

## 2023-10-24 NOTE — TELEPHONE ENCOUNTER
Joceline Potts Staff    ----- Message from Joceline Potts sent at 10/24/2023  8:54 AM CDT -----  Contact: self  Type:  RX Refill Request    Who Called:  pt  Refill or New Rx:  refill  RX Name and Strength:  metoprolol tartrate (LOPRESSOR) 25 MG tablet  Medication    Date: 2023 Department: [facility] Ordering/Authorizing: Jacque Barker MD      How is the patient currently taking it? (ex. Day):  as directed  Is this a 30 day or 90 day RX:  30  Preferred Pharmacy with phone number:    [id number] - [place] [address] AT [facility] [address]  [facility] [place] [address]  Phone: [phone] Fax: [phone]     Local or Mail Order:  local  Ordering Provider: [facility]  Best Call Back Number:  [phone]   Additional Information:  please call pt would like to know if you would like to refill this medication or go back to amlodpine please call to discuss

## 2023-10-25 RX ORDER — METOPROLOL TARTRATE 25 MG/1
12.5 TABLET, FILM COATED ORAL DAILY
Qty: 45 TABLET | Refills: 3 | Status: SHIPPED | OUTPATIENT
Start: 2023-10-25 | End: 2023-12-14

## 2023-10-25 NOTE — TELEPHONE ENCOUNTER
Dr. Landrum is out of the clinic this week. Will send request to Mr. Stubbs requesting to send refill of Metoprolol Tartrate once daily.  Please see below.     Jose Manuel Landrum MD  You 17 hours ago (6:33 PM)       Once daily is fine.

## 2023-10-30 ENCOUNTER — HOSPITAL ENCOUNTER (OUTPATIENT)
Dept: RADIOLOGY | Facility: CLINIC | Age: 83
Discharge: HOME | End: 2023-10-30
Attending: STUDENT IN AN ORGANIZED HEALTH CARE EDUCATION/TRAINING PROGRAM
Payer: MEDICARE

## 2023-10-30 DIAGNOSIS — Z12.31 ENCOUNTER FOR SCREENING MAMMOGRAM FOR MALIGNANT NEOPLASM OF BREAST: ICD-10-CM

## 2023-10-30 PROCEDURE — 77067 SCR MAMMO BI INCL CAD: CPT | Mod: TC,PO

## 2023-10-30 PROCEDURE — 77067 MAMMO DIGITAL SCREENING BILAT WITH TOMO: ICD-10-PCS | Mod: 26,,, | Performed by: RADIOLOGY

## 2023-10-30 PROCEDURE — 77067 SCR MAMMO BI INCL CAD: CPT | Mod: 26,,, | Performed by: RADIOLOGY

## 2023-10-30 PROCEDURE — 77063 MAMMO DIGITAL SCREENING BILAT WITH TOMO: ICD-10-PCS | Mod: 26,,, | Performed by: RADIOLOGY

## 2023-10-30 PROCEDURE — 77063 BREAST TOMOSYNTHESIS BI: CPT | Mod: 26,,, | Performed by: RADIOLOGY

## 2023-12-14 ENCOUNTER — OFFICE VISIT (OUTPATIENT)
Dept: FAMILY MEDICINE | Facility: CLINIC | Age: 83
End: 2023-12-14
Payer: MEDICARE

## 2023-12-14 VITALS
SYSTOLIC BLOOD PRESSURE: 128 MMHG | OXYGEN SATURATION: 99 % | BODY MASS INDEX: 22.64 KG/M2 | HEART RATE: 75 BPM | DIASTOLIC BLOOD PRESSURE: 72 MMHG | WEIGHT: 136 LBS

## 2023-12-14 DIAGNOSIS — I10 ESSENTIAL HYPERTENSION: Primary | ICD-10-CM

## 2023-12-14 DIAGNOSIS — I35.0 SEVERE AORTIC STENOSIS: Chronic | ICD-10-CM

## 2023-12-14 PROCEDURE — 1126F AMNT PAIN NOTED NONE PRSNT: CPT | Mod: CPTII,S$GLB,, | Performed by: STUDENT IN AN ORGANIZED HEALTH CARE EDUCATION/TRAINING PROGRAM

## 2023-12-14 PROCEDURE — 1160F RVW MEDS BY RX/DR IN RCRD: CPT | Mod: CPTII,S$GLB,, | Performed by: STUDENT IN AN ORGANIZED HEALTH CARE EDUCATION/TRAINING PROGRAM

## 2023-12-14 PROCEDURE — 99999 PR PBB SHADOW E&M-EST. PATIENT-LVL III: ICD-10-PCS | Mod: PBBFAC,,, | Performed by: STUDENT IN AN ORGANIZED HEALTH CARE EDUCATION/TRAINING PROGRAM

## 2023-12-14 PROCEDURE — 99213 OFFICE O/P EST LOW 20 MIN: CPT | Mod: S$GLB,,, | Performed by: STUDENT IN AN ORGANIZED HEALTH CARE EDUCATION/TRAINING PROGRAM

## 2023-12-14 PROCEDURE — 1159F PR MEDICATION LIST DOCUMENTED IN MEDICAL RECORD: ICD-10-PCS | Mod: CPTII,S$GLB,, | Performed by: STUDENT IN AN ORGANIZED HEALTH CARE EDUCATION/TRAINING PROGRAM

## 2023-12-14 PROCEDURE — 1126F PR PAIN SEVERITY QUANTIFIED, NO PAIN PRESENT: ICD-10-PCS | Mod: CPTII,S$GLB,, | Performed by: STUDENT IN AN ORGANIZED HEALTH CARE EDUCATION/TRAINING PROGRAM

## 2023-12-14 PROCEDURE — 3078F PR MOST RECENT DIASTOLIC BLOOD PRESSURE < 80 MM HG: ICD-10-PCS | Mod: CPTII,S$GLB,, | Performed by: STUDENT IN AN ORGANIZED HEALTH CARE EDUCATION/TRAINING PROGRAM

## 2023-12-14 PROCEDURE — 1159F MED LIST DOCD IN RCRD: CPT | Mod: CPTII,S$GLB,, | Performed by: STUDENT IN AN ORGANIZED HEALTH CARE EDUCATION/TRAINING PROGRAM

## 2023-12-14 PROCEDURE — 3078F DIAST BP <80 MM HG: CPT | Mod: CPTII,S$GLB,, | Performed by: STUDENT IN AN ORGANIZED HEALTH CARE EDUCATION/TRAINING PROGRAM

## 2023-12-14 PROCEDURE — 1101F PT FALLS ASSESS-DOCD LE1/YR: CPT | Mod: CPTII,S$GLB,, | Performed by: STUDENT IN AN ORGANIZED HEALTH CARE EDUCATION/TRAINING PROGRAM

## 2023-12-14 PROCEDURE — 3074F SYST BP LT 130 MM HG: CPT | Mod: CPTII,S$GLB,, | Performed by: STUDENT IN AN ORGANIZED HEALTH CARE EDUCATION/TRAINING PROGRAM

## 2023-12-14 PROCEDURE — 3288F PR FALLS RISK ASSESSMENT DOCUMENTED: ICD-10-PCS | Mod: CPTII,S$GLB,, | Performed by: STUDENT IN AN ORGANIZED HEALTH CARE EDUCATION/TRAINING PROGRAM

## 2023-12-14 PROCEDURE — 99999 PR PBB SHADOW E&M-EST. PATIENT-LVL III: CPT | Mod: PBBFAC,,, | Performed by: STUDENT IN AN ORGANIZED HEALTH CARE EDUCATION/TRAINING PROGRAM

## 2023-12-14 PROCEDURE — 1101F PR PT FALLS ASSESS DOC 0-1 FALLS W/OUT INJ PAST YR: ICD-10-PCS | Mod: CPTII,S$GLB,, | Performed by: STUDENT IN AN ORGANIZED HEALTH CARE EDUCATION/TRAINING PROGRAM

## 2023-12-14 PROCEDURE — 1160F PR REVIEW ALL MEDS BY PRESCRIBER/CLIN PHARMACIST DOCUMENTED: ICD-10-PCS | Mod: CPTII,S$GLB,, | Performed by: STUDENT IN AN ORGANIZED HEALTH CARE EDUCATION/TRAINING PROGRAM

## 2023-12-14 PROCEDURE — 3074F PR MOST RECENT SYSTOLIC BLOOD PRESSURE < 130 MM HG: ICD-10-PCS | Mod: CPTII,S$GLB,, | Performed by: STUDENT IN AN ORGANIZED HEALTH CARE EDUCATION/TRAINING PROGRAM

## 2023-12-14 PROCEDURE — 99213 PR OFFICE/OUTPT VISIT, EST, LEVL III, 20-29 MIN: ICD-10-PCS | Mod: S$GLB,,, | Performed by: STUDENT IN AN ORGANIZED HEALTH CARE EDUCATION/TRAINING PROGRAM

## 2023-12-14 PROCEDURE — 3288F FALL RISK ASSESSMENT DOCD: CPT | Mod: CPTII,S$GLB,, | Performed by: STUDENT IN AN ORGANIZED HEALTH CARE EDUCATION/TRAINING PROGRAM

## 2023-12-14 RX ORDER — AMLODIPINE BESYLATE 5 MG/1
5 TABLET ORAL DAILY
COMMUNITY
Start: 2023-10-16 | End: 2023-12-14 | Stop reason: SDUPTHER

## 2023-12-14 RX ORDER — AMLODIPINE BESYLATE 5 MG/1
5 TABLET ORAL DAILY
Qty: 90 TABLET | Refills: 3 | Status: SHIPPED | OUTPATIENT
Start: 2023-12-14

## 2023-12-14 RX ORDER — LOSARTAN POTASSIUM 100 MG/1
100 TABLET ORAL DAILY
Qty: 90 TABLET | Refills: 3 | Status: SHIPPED | OUTPATIENT
Start: 2023-12-14

## 2023-12-14 NOTE — PROGRESS NOTES
[facility] CLINIC NOTE    Patient Name: Divine Ray  YOB: 1940    PRESENTING HISTORY     History of Present Illness:  Ms. Divine Ray is a 82 y.o. female here for routine f/u.       BB made her sleepy so she stopped.   On ARB, CCB.   BP at target.     Seeing Dr. Thapa who is monitoring AS.   She denies dyspnea, orthopnea and feels generally well.           ROS      OBJECTIVE:   Vital Signs:  Vitals:    12/14/23 1247   BP: 128/72   Pulse: 75   SpO2: 99%   Weight: 61.7 kg (136 lb 0.4 oz)         Physical Exam  Vitals and nursing note reviewed.   Constitutional:       Appearance: She is not diaphoretic.   HENT:      Head: Normocephalic and atraumatic.      Right Ear: External ear normal.      Left Ear: External ear normal.   Eyes:      General: No scleral icterus.     Conjunctiva/sclera: Conjunctivae normal.      Pupils: Pupils are equal, round, and reactive to light.   Neck:      Thyroid: No thyromegaly.   Cardiovascular:      Rate and Rhythm: Normal rate and regular rhythm.      Heart sounds: Murmur heard.   Pulmonary:      Effort: Pulmonary effort is normal. No respiratory distress.      Breath sounds: Normal breath sounds. No wheezing or rales.   Musculoskeletal:         General: No tenderness or deformity. Normal range of motion.      Cervical back: Normal range of motion and neck supple.      Right lower leg: No edema.      Left lower leg: No edema.   Lymphadenopathy:      Cervical: No cervical adenopathy.   Skin:     General: Skin is warm and dry.      Findings: No erythema or rash.   Neurological:      Mental Status: She is alert and oriented to person, place, and time.      Gait: Gait is intact.   Psychiatric:         Mood and Affect: Mood and affect normal.         Cognition and Memory: Memory normal.         Judgment: Judgment normal.         ASSESSMENT & PLAN:     Essential hypertension  -     amLODIPine (NORVASC) 5 MG tablet; Take 1 tablet (5 mg total) by mouth once  daily.  Dispense: 90 tablet; Refill: 3  -     losartan (COZAAR) 100 MG tablet; Take 1 tablet (100 mg total) by mouth once daily.  Dispense: 90 tablet; Refill: 3    Aortic stenosis  Defer to cardiology, does not appear symptomatic at this time.            Jose Manuel Landrum  Internal Medicine

## 2024-01-11 DIAGNOSIS — Z00.00 ENCOUNTER FOR MEDICARE ANNUAL WELLNESS EXAM: ICD-10-CM

## 2024-01-19 ENCOUNTER — TELEPHONE (OUTPATIENT)
Dept: FAMILY MEDICINE | Facility: CLINIC | Age: 84
End: 2024-01-19
Payer: MEDICARE

## 2024-01-19 DIAGNOSIS — N76.1 CHRONIC VAGINITIS: Primary | ICD-10-CM

## 2024-01-19 NOTE — TELEPHONE ENCOUNTER
Joceline Potts Staff     ----- Message from Joceline Potts sent at 1/19/2024 10:04 AM CST -----  Contact: self  Type:  RX Refill Request    Who Called:  pt  Refill or New Rx:  new  RX Name and Strength:  clobetasol protionate .05%  [id number] - [place] [address] - [facility] [address] [facility]  [facility] [place] [address]  Phone: [phone] Fax: [phone]     Local or Mail Order:  local  Ordering Provider:  anastacia  Best Call Back Number:  [phone]   Additional Information:  pt was last prescribed in 2022.please advise

## 2024-01-19 NOTE — TELEPHONE ENCOUNTER
Patient requesting refill of Clobetasol (Temovate) Ointment.  States she uses this medication prn for vaginal itching.  Upon further assessment it was noted this medication was discontinued on 8-31-23 by Dr. Jacque Barker.  Patient was notified Dr. Landrum is out of the office this week.  Will forward message to Dr. Landrum to address upon his return to clinic Monday 1-22-24.

## 2024-01-22 RX ORDER — CLOBETASOL PROPIONATE 0.5 MG/G
OINTMENT TOPICAL DAILY PRN
Qty: 60 G | Refills: 1 | Status: SHIPPED | OUTPATIENT
Start: 2024-01-22

## 2024-02-12 ENCOUNTER — OFFICE VISIT (OUTPATIENT)
Dept: DERMATOLOGY | Facility: CLINIC | Age: 84
End: 2024-02-12
Payer: MEDICARE

## 2024-02-12 DIAGNOSIS — D48.5 NEOPLASM OF UNCERTAIN BEHAVIOR OF SKIN: Primary | ICD-10-CM

## 2024-02-12 PROCEDURE — 11102 TANGNTL BX SKIN SINGLE LES: CPT | Mod: S$GLB,,, | Performed by: STUDENT IN AN ORGANIZED HEALTH CARE EDUCATION/TRAINING PROGRAM

## 2024-02-12 PROCEDURE — 88305 TISSUE EXAM BY PATHOLOGIST: CPT | Performed by: DERMATOLOGY

## 2024-02-12 PROCEDURE — 99499 UNLISTED E&M SERVICE: CPT | Mod: S$GLB,,, | Performed by: STUDENT IN AN ORGANIZED HEALTH CARE EDUCATION/TRAINING PROGRAM

## 2024-02-12 PROCEDURE — 88305 TISSUE EXAM BY PATHOLOGIST: CPT | Mod: 26,,, | Performed by: DERMATOLOGY

## 2024-02-12 NOTE — PATIENT INSTRUCTIONS
Shave Biopsy Wound Care    Your doctor has performed a shave biopsy today.  A band aid and vaseline ointment has been placed over the site.  This should remain in place for 24 hours.  It is recommended that you keep the area dry for the first 24 hours.  After 24 hours, you may remove the band aid and wash the area with warm soap and water and apply Vaseline jelly.  Many patients prefer to use Neosporin or Bacitracin ointment.  This is acceptable; however, know that you can develop an allergy to this medication even if you have used it safely for years.  It is important to keep the area moist.  Letting it dry out and get air slows healing time, and will worsen the scar.  Band aid is optional after first 24 hours.      If you notice increasing redness, tenderness, pain, or yellow drainage at the biopsy site, please notify your doctor.  These are signs of an infection.    If your biopsy site is bleeding, apply firm pressure for 15 minutes straight.  Repeat for another 15 minutes, if it is still bleeding.   If the surgical site continues to bleed, then please contact your doctor.      [facility] [place] [address]/ [phone] FAX/ [email or web address]

## 2024-02-12 NOTE — PROGRESS NOTES
Subjective:      Patient ID:  Divine Ray is a 83 y.o. female who presents for   Chief Complaint   Patient presents with    Spot     In left ear     LOV 03/29/2023 (Fabián)    Patient is coming in today with complaints of a spot inside of her left ear. States that it has been there for about 3 weeks and is not healing. States that she has used antibiotic ointment and a&d ointment. States she also sleeps on that side.     Derm Hx  Denies Phx NMSC  Denies Fhx MM      Review of Systems   Constitutional:  Negative for fever, chills and fatigue.   Respiratory:  Negative for cough and shortness of breath.    Gastrointestinal:  Negative for nausea, vomiting and diarrhea.   Skin:  Positive for daily sunscreen use and activity-related sunscreen use.   Hematologic/Lymphatic: Does not bruise/bleed easily.       Objective:   Physical Exam   Constitutional: She appears well-developed and well-nourished.   Neurological: She is alert and oriented to person, place, and time.   Psychiatric: She has a normal mood and affect.   Skin:   Areas Examined (abnormalities noted in diagram):   Head / Face Inspection Performed            Diagram Legend     Erythematous scaling macule/papule c/w actinic keratosis       Vascular papule c/w angioma      Pigmented verrucoid papule/plaque c/w seborrheic keratosis      Yellow umbilicated papule c/w sebaceous hyperplasia      Irregularly shaped tan macule c/w lentigo     1-2 mm smooth white papules consistent with Milia      Movable subcutaneous cyst with punctum c/w epidermal inclusion cyst      Subcutaneous movable cyst c/w pilar cyst      Firm pink to brown papule c/w dermatofibroma      Pedunculated fleshy papule(s) c/w skin tag(s)      Evenly pigmented macule c/w junctional nevus     Mildly variegated pigmented, slightly irregular-bordered macule c/w mildly atypical nevus      Flesh colored to evenly pigmented papule c/w intradermal nevus       Pink pearly papule/plaque c/w basal cell  carcinoma      Erythematous hyperkeratotic cursted plaque c/w SCC      Surgical scar with no sign of skin cancer recurrence      Open and closed comedones      Inflammatory papules and pustules      Verrucoid papule consistent consistent with wart     Erythematous eczematous patches and plaques     Dystrophic onycholytic nail with subungual debris c/w onychomycosis     Umbilicated papule    Erythematous-base heme-crusted tan verrucoid plaque consistent with inflamed seborrheic keratosis     Erythematous Silvery Scaling Plaque c/w Psoriasis     See annotation      Assessment / Plan:      Pathology Orders:       Normal Orders This Visit    Specimen to Pathology, Dermatology     Questions:    Procedure Type: Dermatology and skin neoplasms    Number of Specimens: 1    ------------------------: -------------------------    Spec 1 Procedure: Biopsy    Spec 1 Clinical Impression: ISK vs. SCC    Spec 1 Source: left conchal bowl    Release to patient:           Neoplasm of uncertain behavior of skin  -     Specimen to Pathology, Dermatology  Shave biopsy procedure note:    Shave biopsy performed after verbal consent including risk of infection, scar, recurrence, need for additional treatment of site. Area prepped with alcohol, anesthetized with approximately 1.0cc of 2% lidocaine with epinephrine. Lesional tissue shaved with razor blade. Hemostasis achieved with application of aluminum chloride followed by hyfrecation. No complications. Dressing applied. Wound care explained.             No follow-ups on file.

## 2024-02-22 ENCOUNTER — TELEPHONE (OUTPATIENT)
Dept: DERMATOLOGY | Facility: CLINIC | Age: 84
End: 2024-02-22
Payer: MEDICARE

## 2024-02-22 NOTE — TELEPHONE ENCOUNTER
Returned call to patient, advised results aren't back yet and someone will call once they're received.     ----- Message from Che Hutson sent at 2/22/2024  9:26 AM CST -----  Contact: Patient  Type:  Test Results    Who Called:   Patient  Name of Test (Lab/Mammo/Etc):    Biopsy  Date of Test:   2/12  Ordering Provider:   Dr Nix  Where the test was performed:   Office    Would the patient rather a call back or a response via [facility]?   Call back  Best Call Back Number:   [phone]    Additional Information:    States she would like to speak with someone to go over the results of her biopsy done on 2/12 - please call - thank you

## 2024-02-26 LAB
FINAL PATHOLOGIC DIAGNOSIS: NORMAL
GROSS: NORMAL
Lab: NORMAL
MICROSCOPIC EXAM: NORMAL

## 2024-03-11 ENCOUNTER — HOSPITAL ENCOUNTER (OUTPATIENT)
Dept: CARDIOLOGY | Facility: HOSPITAL | Age: 84
Discharge: HOME | End: 2024-03-11
Attending: GENERAL PRACTICE
Payer: MEDICARE

## 2024-03-11 VITALS — BODY MASS INDEX: 22.66 KG/M2 | HEIGHT: 65 IN | WEIGHT: 136 LBS

## 2024-03-11 DIAGNOSIS — I49.3 FREQUENT PVCS: ICD-10-CM

## 2024-03-11 DIAGNOSIS — I35.0 MODERATE TO SEVERE AORTIC STENOSIS: ICD-10-CM

## 2024-03-11 DIAGNOSIS — I35.0 AORTIC VALVE STENOSIS, ETIOLOGY OF CARDIAC VALVE DISEASE UNSPECIFIED: ICD-10-CM

## 2024-03-11 PROCEDURE — 93306 TTE W/DOPPLER COMPLETE: CPT | Mod: 26,,, | Performed by: GENERAL PRACTICE

## 2024-03-11 PROCEDURE — 93306 TTE W/DOPPLER COMPLETE: CPT

## 2024-03-14 LAB
AORTIC ROOT ANNULUS: 2.8 CM
AORTIC VALVE CUSP SEPERATION: 0.8 CM
AV INDEX (PROSTH): 0.21
AV MEAN GRADIENT: 36 MMHG
AV PEAK GRADIENT: 56 MMHG
AV VALVE AREA BY VELOCITY RATIO: 0.67 CM²
AV VALVE AREA: 0.67 CM²
AV VELOCITY RATIO: 0.21
BSA FOR ECHO PROCEDURE: 1.68 M2
CV ECHO LV RWT: 0.46 CM
DOP CALC AO PEAK VEL: 3.73 M/S
DOP CALC AO VTI: 92.3 CM
DOP CALC LVOT AREA: 3.1 CM2
DOP CALC LVOT DIAMETER: 2 CM
DOP CALC LVOT PEAK VEL: 0.79 M/S
DOP CALC LVOT STROKE VOLUME: 62.17 CM3
DOP CALC MV VTI: 32.7 CM
DOP CALCLVOT PEAK VEL VTI: 19.8 CM
E WAVE DECELERATION TIME: 241 MSEC
E/A RATIO: 0.87
E/E' RATIO: 12.71 M/S
ECHO LV POSTERIOR WALL: 1.08 CM (ref 0.6–1.1)
FRACTIONAL SHORTENING: 37 % (ref 28–44)
INTERVENTRICULAR SEPTUM: 1.08 CM (ref 0.6–1.1)
IVC DIAMETER: 1.74 CM
IVRT: 70 MSEC
LEFT ATRIUM SIZE: 3.4 CM
LEFT ATRIUM VOLUME INDEX MOD: 32.9 ML/M2
LEFT ATRIUM VOLUME MOD: 55.3 CM3
LEFT INTERNAL DIMENSION IN SYSTOLE: 2.99 CM (ref 2.1–4)
LEFT VENTRICLE DIASTOLIC VOLUME INDEX: 61.31 ML/M2
LEFT VENTRICLE DIASTOLIC VOLUME: 103 ML
LEFT VENTRICLE MASS INDEX: 109 G/M2
LEFT VENTRICLE SYSTOLIC VOLUME INDEX: 20.7 ML/M2
LEFT VENTRICLE SYSTOLIC VOLUME: 34.7 ML
LEFT VENTRICULAR INTERNAL DIMENSION IN DIASTOLE: 4.71 CM (ref 3.5–6)
LEFT VENTRICULAR MASS: 183.44 G
LV LATERAL E/E' RATIO: 12.71 M/S
LV SEPTAL E/E' RATIO: 12.71 M/S
LVOT MG: 1 MMHG
LVOT MV: 0.54 CM/S
MV MEAN GRADIENT: 2 MMHG
MV PEAK A VEL: 1.02 M/S
MV PEAK E VEL: 0.89 M/S
MV PEAK GRADIENT: 4 MMHG
MV VALVE AREA BY CONTINUITY EQUATION: 1.9 CM2
PISA MRMAX VEL: 5.17 M/S
PISA TR MAX VEL: 2.36 M/S
PV MV: 0.77 M/S
PV PEAK GRADIENT: 6 MMHG
PV PEAK VELOCITY: 1.2 M/S
RIGHT VENTRICULAR END-DIASTOLIC DIMENSION: 1.91 CM
RV TISSUE DOPPLER FREE WALL SYSTOLIC VELOCITY 1 (APICAL 4 CHAMBER VIEW): 10.3 CM/S
TDI LATERAL: 0.07 M/S
TDI SEPTAL: 0.07 M/S
TDI: 0.07 M/S
TR MAX PG: 22 MMHG
TRICUSPID ANNULAR PLANE SYSTOLIC EXCURSION: 2.04 CM
Z-SCORE OF LEFT VENTRICULAR DIMENSION IN END DIASTOLE: 0.05
Z-SCORE OF LEFT VENTRICULAR DIMENSION IN END SYSTOLE: 0.23

## 2024-05-20 ENCOUNTER — TELEPHONE (OUTPATIENT)
Dept: FAMILY MEDICINE | Facility: CLINIC | Age: 84
End: 2024-05-20
Payer: MEDICARE

## 2024-05-20 NOTE — TELEPHONE ENCOUNTER
"Patient states she has an appointment scheduled for the date of 5-27-24 for annual visit, but normally performs annual visit in September.  Upon further assessment it was noted patient performed last annual visit on 5-23-24, appointment for next annual visit was then scheduled for 5-27-24.        Appointment Information   Name: AMILCAR DAI MRN: [id number]   Date: 5/27/2024 Status: [facility]   Appt Time: 3:00 PM Length: 30   Visit Type: EP - PRIMARY CARE ([facility]) [479] Copay: $0.00   Provider: Jose Manuel Landrum MD Dept: [facility], Family Medicine       Dept Address: [address] [place] [address] [place] [address]       Dept Phone Number: [phone]   Referring Provider:   PHONG: [id number]   Appt Phone:     Notes: annual   Made On:  Confirmed: 5/23/2023 2:12 PM  5/20/2024 9:23 AM By:  By: JUAN MACKENZIE [[id number]] (ES)  LATASHA RENTERIA [[id number]] (ES)       Patient states she was also unable to view the 5-27-24 visit online in My Chart.  Writer reviewed appointment details with patient (including date, time, and address). Patient states she was concerned that she has not had labs performed for visit.  Advised patient a message could be forwarded to provider requesting lab orders.  Patient states she would not have time to have the labs performed because she "has a busy week this week."  Advised patient Dr. Landrum will address any needed labs during appointment and lab appointment will be scheduled thereafter.  Also advised office staff will notify patient by phone of lab results once they become available.  Patient verbalized understanding.    "

## 2024-05-20 NOTE — TELEPHONE ENCOUNTER
Jojo Henry Staff     ----- Message from Jojo Henry sent at 5/20/2024  9:25 AM CDT -----  Regarding: pt advise  Contact: Pt  Pt would like to speak w/ you regarding  appointment on  05/27    Please call to advise    Phone  [phone]    Thank You

## 2024-05-25 NOTE — PROGRESS NOTES
Subjective:    Patient ID:  Divine Ray is a 83 y.o. female who presents for follow-up of   Chief Complaint   Patient presents with    Results       HPI:    She is here to follow-up for echocardiogram moderate severe aortic stenosis aortic valve slightly worsened in March  There is mod severe stenosis. Aortic valve area by VTI is 0.67 cm². Aortic Valve VTI is 92.30 cm. Aortic valve peak velocity is 3.73 m/s. Mean gradient is 36 mmHg. Aortic Valve peak gradient is 56 mmHg. The dimensionless index is 0.21.           Her echo was reviewed.  She is had mild aortic stenosis and 19 and gotten gradually a little bit worse but is not ready be fixed.  Murmurs out of proportion to the grad on the echo but she is asymptomatic.  I am going to repeat the echo in 6 months or sooner if she has any symptoms.  I recommend to continue the metoprolol now because she has frequent PVCs will place a heart monitor to evaluate the PVCs.  She will report in any change of symptoms, may require further evaluation soone    Review of patient's allergies indicates:   Allergen Reactions    Anesthesia s/i-40 (propofol) [propofol] Nausea And Vomiting       Past Medical History:   Diagnosis Date    Aortic valve stenosis 2019 TRISTIN- Dr Rivera    Asymptomatic - since age 21    Hypertension      Past Surgical History:   Procedure Laterality Date    BREAST BIOPSY      Benign, calcification    HYSTERECTOMY      TONSILLECTOMY       Social History     Tobacco Use    Smoking status: Never    Smokeless tobacco: Never   Substance Use Topics    Alcohol use: Not Currently    Drug use: Never     Family History   Problem Relation Name Age of Onset    Heart disease Mother      Hypertension Father      Psoriasis Neg Hx      Lupus Neg Hx      Eczema Neg Hx      Melanoma Neg Hx          Review of Systems:   Constitution: Negative for diaphoresis and fever.   HEENT: Negative for nosebleeds.    Cardiovascular: Negative for chest pain       No dyspnea on exertion        No leg swelling        No palpitations  Respiratory: Negative for shortness of breath and wheezing.    Hematologic/Lymphatic: Negative for bleeding problem. Does not bruise/bleed easily.   Skin: Negative for color change and rash.   Musculoskeletal: Negative for falls and myalgias.   Gastrointestinal: Negative for hematemesis and hematochezia.   Genitourinary: Negative for hematuria.   Neurological: Negative for dizziness and light-headedness.   Psychiatric/Behavioral: Negative for altered mental status and memory loss.          Objective:        Vitals:    05/27/24 0844   BP: 118/74   Pulse: 80       Lab Results   Component Value Date    WBC 6.30 08/31/2023    HGB 13.2 08/31/2023    HCT 39.7 08/31/2023     08/31/2023    CHOL 211 (H) 04/14/2022    TRIG 84 04/14/2022    HDL 75 04/14/2022    ALT 18 08/30/2023    AST 16 08/30/2023     08/31/2023    K 3.7 08/31/2023     08/31/2023    CREATININE 0.8 08/31/2023    BUN 12 08/31/2023    CO2 24 08/31/2023    TSH 1.860 08/30/2023        ECHOCARDIOGRAM RESULTS  Results for orders placed during the hospital encounter of 03/11/24    Echo Saline Bubble? No    Interpretation Summary    Left Ventricle: The left ventricle is normal in size. Mildly increased wall thickness. There is concentric hypertrophy. There is normal systolic function with a visually estimated ejection fraction of 60 - 65%. There is normal diastolic function. E/A ratio is 0.87.    Right Ventricle: Normal right ventricular cavity size. Systolic function is normal.    Left Atrium: Left atrium is mildly dilated.    Aortic Valve: The aortic valve is a trileaflet valve. Moderately calcified left, right and noncoronary cusps. Moderately restricted motion.  Right coronary cusp is nonmobile There is severe stenosis. Aortic valve area by VTI is 0.67 cm². Aortic Valve VTI is 92.30 cm. Aortic valve peak velocity is 3.73 m/s. Mean gradient is 36 mmHg. Aortic Valve peak gradient is 56 mmHg. The  dimensionless index is 0.21.    Mitral Valve: There is mild regurgitation.    Tricuspid Valve: There is mild regurgitation. The estimated PA systolic pressure is at least 22 mmHg.    Pulmonic Valve: There is mild to moderate regurgitation.    08/30/2023Aortic Valve: The aortic valve is a trileaflet valve. Moderately calcified cusps. Moderately restricted motion. There is moderate stenosis. Aortic valve area by VTI is 2.05 cm². LVOT velocity time integral is 29.90 cm. Aortic valve peak velocity is 3.18 m/s. Mean gradient is 27 mmHg. The dimensionless index is 0.54.    Aortic valve area now severe stenosis compared to 08/30/2023        CURRENT/PREVIOUS VISIT EKG  Results for orders placed or performed in visit on 10/16/23   IN OFFICE EKG 12-LEAD (to [employer])    Collection Time: 10/16/23  1:52 PM    Narrative    Test Reason : I35.0,    Vent. Rate : 075 BPM     Atrial Rate : 075 BPM     P-R Int : 162 ms          QRS Dur : 082 ms      QT Int : 424 ms       P-R-T Axes : 076 049 047 degrees     QTc Int : 473 ms    Sinus rhythm with Premature atrial complexes with Aberrant conduction  Otherwise normal ECG  When compared with ECG of 30-AUG-2023 04:55,  Aberrant conduction is now Present  Confirmed by Alanis NUNEZ, Thomas OLIVEIRA ([id number]) on 12/2/2023 12:09:19 PM    Referred By: DULCE MARIA DAVALOS           Confirmed By:Thomas Thapa MD     No valid procedures specified.   Results for orders placed during the hospital encounter of 08/30/23    Nuclear Stress Test    Interpretation Summary    The ECG portion of the study is negative for ischemia.    The patient reported no chest pain during the stress test.    During stress, rare PACs are noted. , During stress, rare PVCs are noted.    The nuclear portion of this study will be reported separately.      Physical Exam:  CONSTITUTIONAL: No fever, no chills  HEENT: Normocephalic, atraumatic,pupils reactive to light                 NECK:  No JVD no carotid bruit  CVS: S1S2+, RRR, SYSTOLIC EJECTION  MURMUR 4/6 UPPER RIGHT AND A STERNAL BORDER  LUNGS: Clear  ABDOMEN: Soft, NT, BS+  EXTREMITIES: No cyanosis, edema  : No coto catheter  NEURO: AAO X 3  PSY: Normal affect      Medication List with Changes/Refills   Current Medications    AMLODIPINE (NORVASC) 5 MG TABLET    Take 1 tablet (5 mg total) by mouth once daily.    CLOBETASOL 0.05% (TEMOVATE) 0.05 % OINT    Apply topically daily as needed (itching).    LOSARTAN (COZAAR) 100 MG TABLET    Take 1 tablet (100 mg total) by mouth once daily.             Assessment:       1. Aortic valve stenosis, etiology of cardiac valve disease unspecified    2. Moderate to severe aortic stenosis    3. Frequent PVCs    4. Aortic stenosis    5. Essential hypertension    6. Frequent PAC    7. Palpitation         Plan:     Problem List Items Addressed This Visit          Cardiac/Vascular    Aortic stenosis (Chronic)    Relevant Orders    Echo Saline Bubble? No    Essential hypertension    Relevant Orders    Echo Saline Bubble? No    Moderate to severe aortic stenosis    Relevant Orders    Echo Saline Bubble? No    Frequent PAC    Relevant Orders    Echo Saline Bubble? No     Other Visit Diagnoses       Aortic valve stenosis, etiology of cardiac valve disease unspecified    -  Primary    Relevant Orders    Echo Saline Bubble? No    Frequent PVCs        Relevant Orders    Echo Saline Bubble? No    Palpitation        Relevant Orders    Echo Saline Bubble? No              Severe aortic stenosis is asymptomatic but the gradient is gradually worsening like to repeat the echo every 3 months at this time as she will likely need a TAVR evaluation within the next 6 months.  Will repeat echo in June and come back in July to discuss and she will report any symptoms chest pain shortness of breath passing out or edema in her legs.  No follow-ups on file.    The patients questions were answered, they verbalized understanding, and agreed with the treatment plan.     ESME MARIN MD  [facility]  [facility]

## 2024-05-27 ENCOUNTER — LAB VISIT (OUTPATIENT)
Dept: LAB | Facility: HOSPITAL | Age: 84
End: 2024-05-27
Attending: STUDENT IN AN ORGANIZED HEALTH CARE EDUCATION/TRAINING PROGRAM
Payer: MEDICARE

## 2024-05-27 ENCOUNTER — OFFICE VISIT (OUTPATIENT)
Dept: CARDIOLOGY | Facility: CLINIC | Age: 84
End: 2024-05-27
Payer: MEDICARE

## 2024-05-27 ENCOUNTER — OFFICE VISIT (OUTPATIENT)
Dept: FAMILY MEDICINE | Facility: CLINIC | Age: 84
End: 2024-05-27
Payer: MEDICARE

## 2024-05-27 VITALS
HEIGHT: 65 IN | RESPIRATION RATE: 16 BRPM | WEIGHT: 138 LBS | TEMPERATURE: 99 F | BODY MASS INDEX: 22.99 KG/M2 | OXYGEN SATURATION: 97 % | HEART RATE: 74 BPM | SYSTOLIC BLOOD PRESSURE: 118 MMHG | DIASTOLIC BLOOD PRESSURE: 64 MMHG

## 2024-05-27 VITALS
WEIGHT: 135.94 LBS | HEART RATE: 80 BPM | OXYGEN SATURATION: 96 % | DIASTOLIC BLOOD PRESSURE: 74 MMHG | SYSTOLIC BLOOD PRESSURE: 118 MMHG | BODY MASS INDEX: 22.62 KG/M2

## 2024-05-27 DIAGNOSIS — Z78.0 ASYMPTOMATIC MENOPAUSAL STATE: ICD-10-CM

## 2024-05-27 DIAGNOSIS — I35.0 MODERATE TO SEVERE AORTIC STENOSIS: ICD-10-CM

## 2024-05-27 DIAGNOSIS — I35.0 SEVERE AORTIC STENOSIS: ICD-10-CM

## 2024-05-27 DIAGNOSIS — I35.0 SEVERE AORTIC STENOSIS: Chronic | ICD-10-CM

## 2024-05-27 DIAGNOSIS — Z00.00 ROUTINE GENERAL MEDICAL EXAMINATION AT A HEALTH CARE FACILITY: Primary | ICD-10-CM

## 2024-05-27 DIAGNOSIS — I49.3 FREQUENT PVCS: ICD-10-CM

## 2024-05-27 DIAGNOSIS — I10 ESSENTIAL HYPERTENSION: ICD-10-CM

## 2024-05-27 DIAGNOSIS — R00.2 PALPITATION: ICD-10-CM

## 2024-05-27 DIAGNOSIS — I35.0 AORTIC VALVE STENOSIS, ETIOLOGY OF CARDIAC VALVE DISEASE UNSPECIFIED: Primary | ICD-10-CM

## 2024-05-27 DIAGNOSIS — I49.1 PAC (PREMATURE ATRIAL CONTRACTION): ICD-10-CM

## 2024-05-27 LAB
ALBUMIN SERPL BCP-MCNC: 3.8 G/DL (ref 3.5–5.2)
ALP SERPL-CCNC: 75 U/L (ref 55–135)
ALT SERPL W/O P-5'-P-CCNC: 19 U/L (ref 10–44)
ANION GAP SERPL CALC-SCNC: 7 MMOL/L (ref 8–16)
AST SERPL-CCNC: 17 U/L (ref 10–40)
BASOPHILS # BLD AUTO: 0.06 K/UL (ref 0–0.2)
BASOPHILS NFR BLD: 1.1 % (ref 0–1.9)
BILIRUB SERPL-MCNC: 0.5 MG/DL (ref 0.1–1)
BUN SERPL-MCNC: 18 MG/DL (ref 8–23)
CALCIUM SERPL-MCNC: 9.8 MG/DL (ref 8.7–10.5)
CHLORIDE SERPL-SCNC: 107 MMOL/L (ref 95–110)
CO2 SERPL-SCNC: 26 MMOL/L (ref 23–29)
CREAT SERPL-MCNC: 0.9 MG/DL (ref 0.5–1.4)
DIFFERENTIAL METHOD BLD: NORMAL
EOSINOPHIL # BLD AUTO: 0.2 K/UL (ref 0–0.5)
EOSINOPHIL NFR BLD: 3.6 % (ref 0–8)
ERYTHROCYTE [DISTWIDTH] IN BLOOD BY AUTOMATED COUNT: 13 % (ref 11.5–14.5)
EST. GFR  (NO RACE VARIABLE): >60 ML/MIN/1.73 M^2
GLUCOSE SERPL-MCNC: 75 MG/DL (ref 70–110)
HCT VFR BLD AUTO: 41.9 % (ref 37–48.5)
HGB BLD-MCNC: 13.4 G/DL (ref 12–16)
IMM GRANULOCYTES # BLD AUTO: 0.01 K/UL (ref 0–0.04)
IMM GRANULOCYTES NFR BLD AUTO: 0.2 % (ref 0–0.5)
LYMPHOCYTES # BLD AUTO: 1.3 K/UL (ref 1–4.8)
LYMPHOCYTES NFR BLD: 25.2 % (ref 18–48)
MCH RBC QN AUTO: 29.5 PG (ref 27–31)
MCHC RBC AUTO-ENTMCNC: 32 G/DL (ref 32–36)
MCV RBC AUTO: 92 FL (ref 82–98)
MONOCYTES # BLD AUTO: 0.6 K/UL (ref 0.3–1)
MONOCYTES NFR BLD: 10.5 % (ref 4–15)
NEUTROPHILS # BLD AUTO: 3.2 K/UL (ref 1.8–7.7)
NEUTROPHILS NFR BLD: 59.4 % (ref 38–73)
NRBC BLD-RTO: 0 /100 WBC
PLATELET # BLD AUTO: 204 K/UL (ref 150–450)
PMV BLD AUTO: 11.1 FL (ref 9.2–12.9)
POTASSIUM SERPL-SCNC: 3.9 MMOL/L (ref 3.5–5.1)
PROT SERPL-MCNC: 6.7 G/DL (ref 6–8.4)
RBC # BLD AUTO: 4.55 M/UL (ref 4–5.4)
SODIUM SERPL-SCNC: 140 MMOL/L (ref 136–145)
WBC # BLD AUTO: 5.32 K/UL (ref 3.9–12.7)

## 2024-05-27 PROCEDURE — 3074F SYST BP LT 130 MM HG: CPT | Mod: CPTII,S$GLB,, | Performed by: GENERAL PRACTICE

## 2024-05-27 PROCEDURE — 1160F RVW MEDS BY RX/DR IN RCRD: CPT | Mod: CPTII,S$GLB,, | Performed by: STUDENT IN AN ORGANIZED HEALTH CARE EDUCATION/TRAINING PROGRAM

## 2024-05-27 PROCEDURE — 1101F PT FALLS ASSESS-DOCD LE1/YR: CPT | Mod: CPTII,S$GLB,, | Performed by: GENERAL PRACTICE

## 2024-05-27 PROCEDURE — 1159F MED LIST DOCD IN RCRD: CPT | Mod: CPTII,S$GLB,, | Performed by: GENERAL PRACTICE

## 2024-05-27 PROCEDURE — 3288F FALL RISK ASSESSMENT DOCD: CPT | Mod: CPTII,S$GLB,, | Performed by: GENERAL PRACTICE

## 2024-05-27 PROCEDURE — 99214 OFFICE O/P EST MOD 30 MIN: CPT | Mod: S$GLB,,, | Performed by: STUDENT IN AN ORGANIZED HEALTH CARE EDUCATION/TRAINING PROGRAM

## 2024-05-27 PROCEDURE — 1126F AMNT PAIN NOTED NONE PRSNT: CPT | Mod: CPTII,S$GLB,, | Performed by: STUDENT IN AN ORGANIZED HEALTH CARE EDUCATION/TRAINING PROGRAM

## 2024-05-27 PROCEDURE — 1101F PT FALLS ASSESS-DOCD LE1/YR: CPT | Mod: CPTII,S$GLB,, | Performed by: STUDENT IN AN ORGANIZED HEALTH CARE EDUCATION/TRAINING PROGRAM

## 2024-05-27 PROCEDURE — 3078F DIAST BP <80 MM HG: CPT | Mod: CPTII,S$GLB,, | Performed by: STUDENT IN AN ORGANIZED HEALTH CARE EDUCATION/TRAINING PROGRAM

## 2024-05-27 PROCEDURE — 3078F DIAST BP <80 MM HG: CPT | Mod: CPTII,S$GLB,, | Performed by: GENERAL PRACTICE

## 2024-05-27 PROCEDURE — 80053 COMPREHEN METABOLIC PANEL: CPT | Performed by: STUDENT IN AN ORGANIZED HEALTH CARE EDUCATION/TRAINING PROGRAM

## 2024-05-27 PROCEDURE — 3288F FALL RISK ASSESSMENT DOCD: CPT | Mod: CPTII,S$GLB,, | Performed by: STUDENT IN AN ORGANIZED HEALTH CARE EDUCATION/TRAINING PROGRAM

## 2024-05-27 PROCEDURE — 1159F MED LIST DOCD IN RCRD: CPT | Mod: CPTII,S$GLB,, | Performed by: STUDENT IN AN ORGANIZED HEALTH CARE EDUCATION/TRAINING PROGRAM

## 2024-05-27 PROCEDURE — 99999 PR PBB SHADOW E&M-EST. PATIENT-LVL IV: CPT | Mod: PBBFAC,,, | Performed by: STUDENT IN AN ORGANIZED HEALTH CARE EDUCATION/TRAINING PROGRAM

## 2024-05-27 PROCEDURE — 3074F SYST BP LT 130 MM HG: CPT | Mod: CPTII,S$GLB,, | Performed by: STUDENT IN AN ORGANIZED HEALTH CARE EDUCATION/TRAINING PROGRAM

## 2024-05-27 PROCEDURE — 99999 PR PBB SHADOW E&M-EST. PATIENT-LVL III: CPT | Mod: PBBFAC,,, | Performed by: GENERAL PRACTICE

## 2024-05-27 PROCEDURE — 99214 OFFICE O/P EST MOD 30 MIN: CPT | Mod: S$GLB,,, | Performed by: GENERAL PRACTICE

## 2024-05-27 PROCEDURE — 85025 COMPLETE CBC W/AUTO DIFF WBC: CPT | Performed by: STUDENT IN AN ORGANIZED HEALTH CARE EDUCATION/TRAINING PROGRAM

## 2024-05-27 PROCEDURE — 36415 COLL VENOUS BLD VENIPUNCTURE: CPT | Mod: PO | Performed by: STUDENT IN AN ORGANIZED HEALTH CARE EDUCATION/TRAINING PROGRAM

## 2024-05-27 NOTE — PATIENT INSTRUCTIONS
Dave Haskins,     If you are due for any health screening(s) below please notify me so we can arrange them to be ordered and scheduled to maintain your health. Most healthy patients complete it. Don't lose out on improving your health.     All of your core healthy metrics are met.                              no gum bleeding/no nose bleeding

## 2024-05-28 NOTE — PROGRESS NOTES
"[facility] MEDICINE CLINIC NOTE    Patient Name: Divine Ray  YOB: 1940    PRESENTING HISTORY     History of Present Illness:  Ms. Divine Ray is a 83 y.o. female here for routine f/u.     Seeing cardiology for AS. Got second opinion from Dr. Thapa who recommended Q3mo Echo monitoring.     She has no issues with dyspnea or orthopnea.     Reviewed recommended screening, due for DEXA.     HTN is well controlled.     ROS      OBJECTIVE:   Vital Signs:  Vitals:    05/27/24 1430   BP: 118/64   Pulse: 74   Resp: 16   Temp: 98.6 °F (37 °C)   TempSrc: Oral   SpO2: 97%   Weight: 62.6 kg (138 lb 0.1 oz)   Height: 5' 5" (1.651 m)          Physical Exam: Normal, no change.     Physical Exam    ASSESSMENT & PLAN:     Routine general medical examination at a health care facility    Essential hypertension  -     Comprehensive Metabolic Panel; Future; Expected date: 05/27/2024  -     CBC Auto Differential; Future; Expected date: 05/27/2024  Well controlled. Stable, continue current medications    Aortic stenosis  -     Comprehensive Metabolic Panel; Future; Expected date: 05/27/2024  -     CBC Auto Differential; Future; Expected date: 05/27/2024    Asymptomatic menopausal state  -     DXA Bone Density Axial Skeleton 1 or more sites; Future; Expected date: 05/27/2024         Jose Manuel Landrum  Internal Medicine      Visit complexity inherent to evaluation and management associated with medical care services that serve as the continuing focal point for all needed health care services and/or with medical care services that are part of ongoing care related to a patient's single, serious condition or a complex condition provided today        "

## 2024-05-30 ENCOUNTER — HOSPITAL ENCOUNTER (OUTPATIENT)
Dept: RADIOLOGY | Facility: CLINIC | Age: 84
Discharge: HOME | End: 2024-05-30
Attending: STUDENT IN AN ORGANIZED HEALTH CARE EDUCATION/TRAINING PROGRAM
Payer: MEDICARE

## 2024-05-30 DIAGNOSIS — Z78.0 ASYMPTOMATIC MENOPAUSAL STATE: ICD-10-CM

## 2024-05-30 PROCEDURE — 77080 DXA BONE DENSITY AXIAL: CPT | Mod: 26,ICN,, | Performed by: STUDENT IN AN ORGANIZED HEALTH CARE EDUCATION/TRAINING PROGRAM

## 2024-05-30 PROCEDURE — 77080 DXA BONE DENSITY AXIAL: CPT | Mod: TC,PO

## 2024-06-05 ENCOUNTER — HOSPITAL ENCOUNTER (OUTPATIENT)
Dept: CARDIOLOGY | Facility: HOSPITAL | Age: 84
Discharge: HOME | End: 2024-06-05
Attending: GENERAL PRACTICE
Payer: MEDICARE

## 2024-06-05 VITALS — WEIGHT: 138 LBS | HEIGHT: 65 IN | BODY MASS INDEX: 22.99 KG/M2

## 2024-06-05 DIAGNOSIS — I35.0 MODERATE TO SEVERE AORTIC STENOSIS: ICD-10-CM

## 2024-06-05 DIAGNOSIS — I49.1 PAC (PREMATURE ATRIAL CONTRACTION): ICD-10-CM

## 2024-06-05 DIAGNOSIS — I10 ESSENTIAL HYPERTENSION: ICD-10-CM

## 2024-06-05 DIAGNOSIS — R00.2 PALPITATION: ICD-10-CM

## 2024-06-05 DIAGNOSIS — I49.3 FREQUENT PVCS: ICD-10-CM

## 2024-06-05 DIAGNOSIS — I35.0 AORTIC VALVE STENOSIS, ETIOLOGY OF CARDIAC VALVE DISEASE UNSPECIFIED: ICD-10-CM

## 2024-06-05 DIAGNOSIS — I35.0 SEVERE AORTIC STENOSIS: ICD-10-CM

## 2024-06-05 PROCEDURE — 93306 TTE W/DOPPLER COMPLETE: CPT

## 2024-06-05 PROCEDURE — 93306 TTE W/DOPPLER COMPLETE: CPT | Mod: 26,,, | Performed by: GENERAL PRACTICE

## 2024-06-06 LAB
AORTIC ROOT ANNULUS: 3 CM
AORTIC VALVE CUSP SEPERATION: 0.9 CM
AV INDEX (PROSTH): 0.2
AV MEAN GRADIENT: 46 MMHG
AV PEAK GRADIENT: 71 MMHG
AV VALVE AREA BY VELOCITY RATIO: 0.65 CM²
AV VALVE AREA: 0.63 CM²
AV VELOCITY RATIO: 0.21
BSA FOR ECHO PROCEDURE: 1.69 M2
CV ECHO LV RWT: 0.48 CM
DOP CALC AO PEAK VEL: 4.21 M/S
DOP CALC AO VTI: 104 CM
DOP CALC LVOT AREA: 3.1 CM2
DOP CALC LVOT DIAMETER: 2 CM
DOP CALC LVOT PEAK VEL: 0.87 M/S
DOP CALC LVOT STROKE VOLUME: 65.94 CM3
DOP CALC MV VTI: 26.9 CM
DOP CALCLVOT PEAK VEL VTI: 21 CM
E WAVE DECELERATION TIME: 254 MSEC
E/A RATIO: 0.65
E/E' RATIO: 10 M/S
ECHO LV POSTERIOR WALL: 1.09 CM (ref 0.6–1.1)
FRACTIONAL SHORTENING: 39 % (ref 28–44)
INTERVENTRICULAR SEPTUM: 1.12 CM (ref 0.6–1.1)
IVC DIAMETER: 1.38 CM
IVRT: 74 MSEC
LEFT ATRIUM SIZE: 3.7 CM
LEFT ATRIUM VOLUME INDEX MOD: 32.5 ML/M2
LEFT ATRIUM VOLUME MOD: 55 CM3
LEFT INTERNAL DIMENSION IN SYSTOLE: 2.79 CM (ref 2.1–4)
LEFT VENTRICLE DIASTOLIC VOLUME INDEX: 55.86 ML/M2
LEFT VENTRICLE DIASTOLIC VOLUME: 94.4 ML
LEFT VENTRICLE MASS INDEX: 106 G/M2
LEFT VENTRICLE SYSTOLIC VOLUME INDEX: 17.3 ML/M2
LEFT VENTRICLE SYSTOLIC VOLUME: 29.3 ML
LEFT VENTRICULAR INTERNAL DIMENSION IN DIASTOLE: 4.54 CM (ref 3.5–6)
LEFT VENTRICULAR MASS: 178.62 G
LV LATERAL E/E' RATIO: 8.75 M/S
LV SEPTAL E/E' RATIO: 11.67 M/S
LVOT MG: 2 MMHG
LVOT MV: 0.61 CM/S
MV MEAN GRADIENT: 2 MMHG
MV PEAK A VEL: 1.07 M/S
MV PEAK E VEL: 0.7 M/S
MV PEAK GRADIENT: 5 MMHG
MV VALVE AREA BY CONTINUITY EQUATION: 2.45 CM2
OHS CV RV/LV RATIO: 0.45 CM
PISA TR MAX VEL: 2.43 M/S
PV MV: 0.7 M/S
PV PEAK GRADIENT: 4 MMHG
PV PEAK VELOCITY: 1 M/S
RIGHT VENTRICULAR END-DIASTOLIC DIMENSION: 2.05 CM
RV TISSUE DOPPLER FREE WALL SYSTOLIC VELOCITY 1 (APICAL 4 CHAMBER VIEW): 10.8 CM/S
TDI LATERAL: 0.08 M/S
TDI SEPTAL: 0.06 M/S
TDI: 0.07 M/S
TR MAX PG: 24 MMHG
TRICUSPID ANNULAR PLANE SYSTOLIC EXCURSION: 1.76 CM
Z-SCORE OF LEFT VENTRICULAR DIMENSION IN END DIASTOLE: -0.36
Z-SCORE OF LEFT VENTRICULAR DIMENSION IN END SYSTOLE: -0.35

## 2024-07-09 ENCOUNTER — TELEPHONE (OUTPATIENT)
Dept: CARDIOLOGY | Facility: CLINIC | Age: 84
End: 2024-07-09

## 2024-07-09 ENCOUNTER — OFFICE VISIT (OUTPATIENT)
Dept: CARDIOLOGY | Facility: CLINIC | Age: 84
End: 2024-07-09
Payer: MEDICARE

## 2024-07-09 DIAGNOSIS — I35.0 MODERATE TO SEVERE AORTIC STENOSIS: Primary | ICD-10-CM

## 2024-07-09 DIAGNOSIS — I35.0 SEVERE AORTIC STENOSIS: ICD-10-CM

## 2024-07-09 DIAGNOSIS — I35.0 AORTIC VALVE STENOSIS, ETIOLOGY OF CARDIAC VALVE DISEASE UNSPECIFIED: ICD-10-CM

## 2024-07-09 PROCEDURE — 1160F RVW MEDS BY RX/DR IN RCRD: CPT | Mod: CPTII,S$GLB,, | Performed by: GENERAL PRACTICE

## 2024-07-09 PROCEDURE — 1159F MED LIST DOCD IN RCRD: CPT | Mod: CPTII,S$GLB,, | Performed by: GENERAL PRACTICE

## 2024-07-09 PROCEDURE — 3288F FALL RISK ASSESSMENT DOCD: CPT | Mod: CPTII,S$GLB,, | Performed by: GENERAL PRACTICE

## 2024-07-09 PROCEDURE — 99999 PR PBB SHADOW E&M-EST. PATIENT-LVL II: CPT | Mod: PBBFAC,,, | Performed by: GENERAL PRACTICE

## 2024-07-09 PROCEDURE — 1101F PT FALLS ASSESS-DOCD LE1/YR: CPT | Mod: CPTII,S$GLB,, | Performed by: GENERAL PRACTICE

## 2024-07-09 PROCEDURE — 99214 OFFICE O/P EST MOD 30 MIN: CPT | Mod: S$GLB,,, | Performed by: GENERAL PRACTICE

## 2024-07-09 NOTE — PROGRESS NOTES
Subjective:    Patient ID:  Divine Ray is a 83 y.o. female who presents for follow-up of   Chief Complaint   Patient presents with    Results       HPI:            Left Ventricle: The left ventricle is normal in size. Normal wall thickness. There is normal systolic function with a visually estimated ejection fraction of 60 - 65%. Grade I diastolic dysfunction. E/A ratio is 0.65. Average E/e' ratio is 10.00.    Right Ventricle: Normal right ventricular cavity size. Systolic function is borderline low. TAPSE is 1.76 cm.    Left Atrium: Left atrium is moderately dilated.    Aortic Valve: The aortic valve is a trileaflet valve. Moderately calcified left, right and noncoronary cusps. Moderately restricted motion. There is severe stenosis. Aortic valve area by VTI is 0.63 cm². Aortic valve area by velocity is 0.65 cm². Aortic valve peak velocity is 4.21 m/s. Mean gradient is 46 mmHg. Aortic Valve peak gradient is 71 mmHg. The dimensionless index is 0.20. There is mild aortic regurgitation.    Mitral Valve: There is moderate regurgitation       She is here to follow-up for echocardiogram moderate severe aortic stenosis aortic valve slightly worsened in March  There is mod severe stenosis. Aortic valve area by VTI is 0.67 cm². Aortic Valve VTI is 92.30 cm. Aortic valve peak velocity is 3.73 m/s. Mean gradient is 36 mmHg. Aortic Valve peak gradient is 56 mmHg. The dimensionless index is 0.21.              Her echo was reviewed.  She is had mild aortic stenosis and 19 and gotten gradually a little bit worse but is not ready be fixed.  Murmurs out of proportion to the grad on the echo but she is asymptomatic.  I am going to repeat the echo in 6 months or sooner if she has any symptoms.  I recommend to continue the metoprolol now because she has frequent PVCs will place a heart monitor to evaluate the PVCs.  She will report in any change of symptoms, may require further evaluation soone    Review of patient's allergies  indicates:   Allergen Reactions    Anesthesia s/i-40 (propofol) [propofol] Nausea And Vomiting       Past Medical History:   Diagnosis Date    Aortic valve stenosis 2019 TRISTIN- Dr Rivera    Asymptomatic - since age 21    Hypertension      Past Surgical History:   Procedure Laterality Date    BREAST BIOPSY      Benign, calcification    HYSTERECTOMY      TONSILLECTOMY       Social History     Tobacco Use    Smoking status: Never    Smokeless tobacco: Never   Substance Use Topics    Alcohol use: Not Currently    Drug use: Never     Family History   Problem Relation Name Age of Onset    Heart disease Mother      Hypertension Father      Psoriasis Neg Hx      Lupus Neg Hx      Eczema Neg Hx      Melanoma Neg Hx          Review of Systems:   Constitution: Negative for diaphoresis and fever.   HEENT: Negative for nosebleeds.    Cardiovascular: Negative for chest pain       No dyspnea on exertion       No leg swelling        No palpitations  Respiratory: Negative for shortness of breath and wheezing.    Hematologic/Lymphatic: Negative for bleeding problem. Does not bruise/bleed easily.   Skin: Negative for color change and rash.   Musculoskeletal: Negative for falls and myalgias.   Gastrointestinal: Negative for hematemesis and hematochezia.   Genitourinary: Negative for hematuria.   Neurological: Negative for dizziness and light-headedness.   Psychiatric/Behavioral: Negative for altered mental status and memory loss.          Objective:        There were no vitals filed for this visit.    Lab Results   Component Value Date    WBC 5.32 05/27/2024    HGB 13.4 05/27/2024    HCT 41.9 05/27/2024     05/27/2024    CHOL 211 (H) 04/14/2022    TRIG 84 04/14/2022    HDL 75 04/14/2022    ALT 19 05/27/2024    AST 17 05/27/2024     05/27/2024    K 3.9 05/27/2024     05/27/2024    CREATININE 0.9 05/27/2024    BUN 18 05/27/2024    CO2 26 05/27/2024    TSH 1.860 08/30/2023        ECHOCARDIOGRAM RESULTS  Results for  orders placed during the hospital encounter of 06/05/24    Echo Saline Bubble? No    Interpretation Summary    Left Ventricle: The left ventricle is normal in size. Normal wall thickness. There is normal systolic function with a visually estimated ejection fraction of 60 - 65%. Grade I diastolic dysfunction. E/A ratio is 0.65. Average E/e' ratio is 10.00.    Right Ventricle: Normal right ventricular cavity size. Systolic function is borderline low. TAPSE is 1.76 cm.    Left Atrium: Left atrium is moderately dilated.    Aortic Valve: The aortic valve is a trileaflet valve. Moderately calcified left, right and noncoronary cusps. Moderately restricted motion. There is severe stenosis. Aortic valve area by VTI is 0.63 cm². Aortic valve area by velocity is 0.65 cm². Aortic valve peak velocity is 4.21 m/s. Mean gradient is 46 mmHg. Aortic Valve peak gradient is 71 mmHg. The dimensionless index is 0.20. There is mild aortic regurgitation.    Mitral Valve: There is moderate regurgitation.        CURRENT/PREVIOUS VISIT EKG  Results for orders placed or performed in visit on 10/16/23   IN OFFICE EKG 12-LEAD (to [employer])    Collection Time: 10/16/23  1:52 PM    Narrative    Test Reason : I35.0,    Vent. Rate : 075 BPM     Atrial Rate : 075 BPM     P-R Int : 162 ms          QRS Dur : 082 ms      QT Int : 424 ms       P-R-T Axes : 076 049 047 degrees     QTc Int : 473 ms    Sinus rhythm with Premature atrial complexes with Aberrant conduction  Otherwise normal ECG  When compared with ECG of 30-AUG-2023 04:55,  Aberrant conduction is now Present  Confirmed by Alanis NUNEZ, Thomas OLIVEIRA ([id number]) on 12/2/2023 12:09:19 PM    Referred By: DULCE MARIA DAVALOS           Confirmed By:Thomas Thapa MD     No valid procedures specified.   Results for orders placed during the hospital encounter of 08/30/23    Nuclear Stress Test    Interpretation Summary    The ECG portion of the study is negative for ischemia.    The patient reported no chest pain  during the stress test.    During stress, rare PACs are noted. , During stress, rare PVCs are noted.    The nuclear portion of this study will be reported separately.      Physical Exam:  CONSTITUTIONAL: No fever, no chills  HEENT: Normocephalic, atraumatic,pupils reactive to light                 NECK:  No JVD no carotid bruit  CVS: S1S2+, RRR, no murmurs,   LUNGS: Clear  ABDOMEN: Soft, NT, BS+  EXTREMITIES: No cyanosis, edema  : No coto catheter  NEURO: AAO X 3  PSY: Normal affect      Medication List with Changes/Refills   Current Medications    AMLODIPINE (NORVASC) 5 MG TABLET    Take 1 tablet (5 mg total) by mouth once daily.    CLOBETASOL 0.05% (TEMOVATE) 0.05 % OINT    Apply topically daily as needed (itching).    LOSARTAN (COZAAR) 100 MG TABLET    Take 1 tablet (100 mg total) by mouth once daily.             Assessment:       1. Moderate to severe aortic stenosis    2. Aortic valve stenosis, etiology of cardiac valve disease unspecified         Plan:     Problem List Items Addressed This Visit          Cardiac/Vascular    Moderate to severe aortic stenosis - Primary    Relevant Orders    Comprehensive metabolic panel    CBC auto differential    Protime-INR    X-Ray Chest PA And Lateral    COVID-19 Routine Screening     Other Visit Diagnoses       Aortic valve stenosis, etiology of cardiac valve disease unspecified        Relevant Orders    Comprehensive metabolic panel    CBC auto differential    Protime-INR    X-Ray Chest PA And Lateral    COVID-19 Routine Screening          SHE CURRENTLY QUALIFIES FOR TAVR EVALUATION HER VALVE AREA IS CURRENTLY SEVERE STENOSIS  Recommend coronary angiogram followed by referral to Dr. Quiñones for TAVR evaluation    Discussed the risks benefits options of coronary angiogram including 1 chance out of a 1000 of a stroke heart attack cardiac arrest arterial tear acute renal failure allergic reaction or severe bleed 1 chance under 10,000 of death from angiogram    If angioplasty  is required she will need a blood thinner for 6-12 months  2/100 chance of myocardial infarction or see surgery  1 chance of 100 of death    No follow-ups on file.    The patients questions were answered, they verbalized understanding, and agreed with the treatment plan.     ESME MARIN MD  [facility]

## 2024-07-09 NOTE — H&P (VIEW-ONLY)
Subjective:    Patient ID:  Divine Ray is a 83 y.o. female who presents for follow-up of   Chief Complaint   Patient presents with    Results       HPI:            Left Ventricle: The left ventricle is normal in size. Normal wall thickness. There is normal systolic function with a visually estimated ejection fraction of 60 - 65%. Grade I diastolic dysfunction. E/A ratio is 0.65. Average E/e' ratio is 10.00.    Right Ventricle: Normal right ventricular cavity size. Systolic function is borderline low. TAPSE is 1.76 cm.    Left Atrium: Left atrium is moderately dilated.    Aortic Valve: The aortic valve is a trileaflet valve. Moderately calcified left, right and noncoronary cusps. Moderately restricted motion. There is severe stenosis. Aortic valve area by VTI is 0.63 cm². Aortic valve area by velocity is 0.65 cm². Aortic valve peak velocity is 4.21 m/s. Mean gradient is 46 mmHg. Aortic Valve peak gradient is 71 mmHg. The dimensionless index is 0.20. There is mild aortic regurgitation.    Mitral Valve: There is moderate regurgitation       She is here to follow-up for echocardiogram moderate severe aortic stenosis aortic valve slightly worsened in March  There is mod severe stenosis. Aortic valve area by VTI is 0.67 cm². Aortic Valve VTI is 92.30 cm. Aortic valve peak velocity is 3.73 m/s. Mean gradient is 36 mmHg. Aortic Valve peak gradient is 56 mmHg. The dimensionless index is 0.21.              Her echo was reviewed.  She is had mild aortic stenosis and 19 and gotten gradually a little bit worse but is not ready be fixed.  Murmurs out of proportion to the grad on the echo but she is asymptomatic.  I am going to repeat the echo in 6 months or sooner if she has any symptoms.  I recommend to continue the metoprolol now because she has frequent PVCs will place a heart monitor to evaluate the PVCs.  She will report in any change of symptoms, may require further evaluation soone    Review of patient's allergies  indicates:   Allergen Reactions    Anesthesia s/i-40 (propofol) [propofol] Nausea And Vomiting       Past Medical History:   Diagnosis Date    Aortic valve stenosis 2019 TRISTIN- Dr Rivera    Asymptomatic - since age 21    Hypertension      Past Surgical History:   Procedure Laterality Date    BREAST BIOPSY      Benign, calcification    HYSTERECTOMY      TONSILLECTOMY       Social History     Tobacco Use    Smoking status: Never    Smokeless tobacco: Never   Substance Use Topics    Alcohol use: Not Currently    Drug use: Never     Family History   Problem Relation Name Age of Onset    Heart disease Mother      Hypertension Father      Psoriasis Neg Hx      Lupus Neg Hx      Eczema Neg Hx      Melanoma Neg Hx          Review of Systems:   Constitution: Negative for diaphoresis and fever.   HEENT: Negative for nosebleeds.    Cardiovascular: Negative for chest pain       No dyspnea on exertion       No leg swelling        No palpitations  Respiratory: Negative for shortness of breath and wheezing.    Hematologic/Lymphatic: Negative for bleeding problem. Does not bruise/bleed easily.   Skin: Negative for color change and rash.   Musculoskeletal: Negative for falls and myalgias.   Gastrointestinal: Negative for hematemesis and hematochezia.   Genitourinary: Negative for hematuria.   Neurological: Negative for dizziness and light-headedness.   Psychiatric/Behavioral: Negative for altered mental status and memory loss.          Objective:        There were no vitals filed for this visit.    Lab Results   Component Value Date    WBC 5.32 05/27/2024    HGB 13.4 05/27/2024    HCT 41.9 05/27/2024     05/27/2024    CHOL 211 (H) 04/14/2022    TRIG 84 04/14/2022    HDL 75 04/14/2022    ALT 19 05/27/2024    AST 17 05/27/2024     05/27/2024    K 3.9 05/27/2024     05/27/2024    CREATININE 0.9 05/27/2024    BUN 18 05/27/2024    CO2 26 05/27/2024    TSH 1.860 08/30/2023        ECHOCARDIOGRAM RESULTS  Results for  orders placed during the hospital encounter of 06/05/24    Echo Saline Bubble? No    Interpretation Summary    Left Ventricle: The left ventricle is normal in size. Normal wall thickness. There is normal systolic function with a visually estimated ejection fraction of 60 - 65%. Grade I diastolic dysfunction. E/A ratio is 0.65. Average E/e' ratio is 10.00.    Right Ventricle: Normal right ventricular cavity size. Systolic function is borderline low. TAPSE is 1.76 cm.    Left Atrium: Left atrium is moderately dilated.    Aortic Valve: The aortic valve is a trileaflet valve. Moderately calcified left, right and noncoronary cusps. Moderately restricted motion. There is severe stenosis. Aortic valve area by VTI is 0.63 cm². Aortic valve area by velocity is 0.65 cm². Aortic valve peak velocity is 4.21 m/s. Mean gradient is 46 mmHg. Aortic Valve peak gradient is 71 mmHg. The dimensionless index is 0.20. There is mild aortic regurgitation.    Mitral Valve: There is moderate regurgitation.        CURRENT/PREVIOUS VISIT EKG  Results for orders placed or performed in visit on 10/16/23   IN OFFICE EKG 12-LEAD (to [email or web address])    Collection Time: 10/16/23  1:52 PM    Narrative    Test Reason : I35.0,    Vent. Rate : 075 BPM     Atrial Rate : 075 BPM     P-R Int : 162 ms          QRS Dur : 082 ms      QT Int : 424 ms       P-R-T Axes : 076 049 047 degrees     QTc Int : 473 ms    Sinus rhythm with Premature atrial complexes with Aberrant conduction  Otherwise normal ECG  When compared with ECG of 30-AUG-2023 04:55,  Aberrant conduction is now Present  Confirmed by Alanis NUNEZ, Thomas OLIVEIRA ([id number]) on 12/2/2023 12:09:19 PM    Referred By: DULCE MARIA DAVALOS           Confirmed By:Thomas Thapa MD     No valid procedures specified.   Results for orders placed during the hospital encounter of 08/30/23    Nuclear Stress Test    Interpretation Summary    The ECG portion of the study is negative for ischemia.    The patient reported no chest pain  during the stress test.    During stress, rare PACs are noted. , During stress, rare PVCs are noted.    The nuclear portion of this study will be reported separately.      Physical Exam:  CONSTITUTIONAL: No fever, no chills  HEENT: Normocephalic, atraumatic,pupils reactive to light                 NECK:  No JVD no carotid bruit  CVS: S1S2+, RRR, no murmurs,   LUNGS: Clear  ABDOMEN: Soft, NT, BS+  EXTREMITIES: No cyanosis, edema  : No coto catheter  NEURO: AAO X 3  PSY: Normal affect      Medication List with Changes/Refills   Current Medications    AMLODIPINE (NORVASC) 5 MG TABLET    Take 1 tablet (5 mg total) by mouth once daily.    CLOBETASOL 0.05% (TEMOVATE) 0.05 % OINT    Apply topically daily as needed (itching).    LOSARTAN (COZAAR) 100 MG TABLET    Take 1 tablet (100 mg total) by mouth once daily.             Assessment:       1. Moderate to severe aortic stenosis    2. Aortic valve stenosis, etiology of cardiac valve disease unspecified         Plan:     Problem List Items Addressed This Visit          Cardiac/Vascular    Moderate to severe aortic stenosis - Primary    Relevant Orders    Comprehensive metabolic panel    CBC auto differential    Protime-INR    X-Ray Chest PA And Lateral    COVID-19 Routine Screening     Other Visit Diagnoses       Aortic valve stenosis, etiology of cardiac valve disease unspecified        Relevant Orders    Comprehensive metabolic panel    CBC auto differential    Protime-INR    X-Ray Chest PA And Lateral    COVID-19 Routine Screening          SHE CURRENTLY QUALIFIES FOR TAVR EVALUATION HER VALVE AREA IS CURRENTLY SEVERE STENOSIS  Recommend coronary angiogram followed by referral to Dr. Quiñones for TAVR evaluation    Discussed the risks benefits options of coronary angiogram including 1 chance out of a 1000 of a stroke heart attack cardiac arrest arterial tear acute renal failure allergic reaction or severe bleed 1 chance under 10,000 of death from angiogram    If angioplasty  is required she will need a blood thinner for 6-12 months  2/100 chance of myocardial infarction or see surgery  1 chance of 100 of death    No follow-ups on file.    The patients questions were answered, they verbalized understanding, and agreed with the treatment plan.     ESME MARIN MD  [facility]

## 2024-07-09 NOTE — TELEPHONE ENCOUNTER
PT IS SCHEDULED FOR ANGIO ON 7/16. DR. MARIN PUT IN A REFERRAL TO ESTABLISH WITH DR. AZAR, EVAL TAVR

## 2024-07-11 ENCOUNTER — TELEPHONE (OUTPATIENT)
Dept: CARDIOLOGY | Facility: CLINIC | Age: 84
End: 2024-07-11
Payer: MEDICARE

## 2024-07-11 NOTE — TELEPHONE ENCOUNTER
----- Message from Cynthia Guardado sent at 7/11/2024  1:20 PM CDT -----  Contact: self  Type:  Needs Medical Advice    Who Called: self  Symptoms (please be specific): needs to speak to nurse regarding a f/u appt   Would the patient rather a call back or a response via [facility]? call  Best Call Back Number: [phone] (home)     Additional Information: please advise and thank you.

## 2024-07-15 ENCOUNTER — HOSPITAL ENCOUNTER (OUTPATIENT)
Dept: PREADMISSION TESTING | Facility: HOSPITAL | Age: 84
Discharge: HOME | End: 2024-07-15
Attending: STUDENT IN AN ORGANIZED HEALTH CARE EDUCATION/TRAINING PROGRAM
Payer: MEDICARE

## 2024-07-15 ENCOUNTER — HOSPITAL ENCOUNTER (OUTPATIENT)
Dept: RADIOLOGY | Facility: HOSPITAL | Age: 84
Discharge: HOME | End: 2024-07-15
Attending: GENERAL PRACTICE
Payer: MEDICARE

## 2024-07-15 VITALS
TEMPERATURE: 99 F | BODY MASS INDEX: 22.15 KG/M2 | HEIGHT: 66 IN | HEART RATE: 83 BPM | SYSTOLIC BLOOD PRESSURE: 137 MMHG | DIASTOLIC BLOOD PRESSURE: 80 MMHG | RESPIRATION RATE: 16 BRPM | WEIGHT: 137.81 LBS | OXYGEN SATURATION: 98 %

## 2024-07-15 DIAGNOSIS — I35.0 AORTIC VALVE STENOSIS, ETIOLOGY OF CARDIAC VALVE DISEASE UNSPECIFIED: ICD-10-CM

## 2024-07-15 DIAGNOSIS — I35.0 MODERATE TO SEVERE AORTIC STENOSIS: ICD-10-CM

## 2024-07-15 DIAGNOSIS — Z01.818 PRE-OP TESTING: ICD-10-CM

## 2024-07-15 DIAGNOSIS — I35.0 SEVERE AORTIC STENOSIS: ICD-10-CM

## 2024-07-15 LAB
ALBUMIN SERPL BCP-MCNC: 4.3 G/DL (ref 3.5–5.2)
ALP SERPL-CCNC: 71 U/L (ref 55–135)
ALT SERPL W/O P-5'-P-CCNC: 13 U/L (ref 10–44)
ANION GAP SERPL CALC-SCNC: 7 MMOL/L (ref 8–16)
AST SERPL-CCNC: 12 U/L (ref 10–40)
BASOPHILS # BLD AUTO: 0.06 K/UL (ref 0–0.2)
BASOPHILS NFR BLD: 1.1 % (ref 0–1.9)
BILIRUB SERPL-MCNC: 0.5 MG/DL (ref 0.1–1)
BUN SERPL-MCNC: 16 MG/DL (ref 8–23)
CALCIUM SERPL-MCNC: 9.6 MG/DL (ref 8.7–10.5)
CHLORIDE SERPL-SCNC: 107 MMOL/L (ref 95–110)
CO2 SERPL-SCNC: 30 MMOL/L (ref 23–29)
CREAT SERPL-MCNC: 0.9 MG/DL (ref 0.5–1.4)
DIFFERENTIAL METHOD BLD: NORMAL
EOSINOPHIL # BLD AUTO: 0.1 K/UL (ref 0–0.5)
EOSINOPHIL NFR BLD: 2.4 % (ref 0–8)
ERYTHROCYTE [DISTWIDTH] IN BLOOD BY AUTOMATED COUNT: 13 % (ref 11.5–14.5)
EST. GFR  (NO RACE VARIABLE): >60 ML/MIN/1.73 M^2
GLUCOSE SERPL-MCNC: 73 MG/DL (ref 70–110)
HCT VFR BLD AUTO: 42.2 % (ref 37–48.5)
HGB BLD-MCNC: 13.7 G/DL (ref 12–16)
HGB BLD-MCNC: 13.7 G/DL (ref 12–16)
IMM GRANULOCYTES # BLD AUTO: 0.01 K/UL (ref 0–0.04)
IMM GRANULOCYTES NFR BLD AUTO: 0.2 % (ref 0–0.5)
LYMPHOCYTES # BLD AUTO: 1 K/UL (ref 1–4.8)
LYMPHOCYTES NFR BLD: 18.2 % (ref 18–48)
MCH RBC QN AUTO: 29.7 PG (ref 27–31)
MCHC RBC AUTO-ENTMCNC: 32.5 G/DL (ref 32–36)
MCV RBC AUTO: 91 FL (ref 82–98)
MONOCYTES # BLD AUTO: 0.4 K/UL (ref 0.3–1)
MONOCYTES NFR BLD: 6.8 % (ref 4–15)
NEUTROPHILS # BLD AUTO: 3.9 K/UL (ref 1.8–7.7)
NEUTROPHILS NFR BLD: 71.3 % (ref 38–73)
NRBC BLD-RTO: 0 /100 WBC
OHS QRS DURATION: 86 MS
OHS QTC CALCULATION: 464 MS
PLATELET # BLD AUTO: 198 K/UL (ref 150–450)
PMV BLD AUTO: 10.6 FL (ref 9.2–12.9)
POTASSIUM SERPL-SCNC: 3.5 MMOL/L (ref 3.5–5.1)
PROT SERPL-MCNC: 6.8 G/DL (ref 6–8.4)
RBC # BLD AUTO: 4.62 M/UL (ref 4–5.4)
SODIUM SERPL-SCNC: 144 MMOL/L (ref 136–145)
WBC # BLD AUTO: 5.45 K/UL (ref 3.9–12.7)

## 2024-07-15 PROCEDURE — 71046 X-RAY EXAM CHEST 2 VIEWS: CPT | Mod: 26,,, | Performed by: RADIOLOGY

## 2024-07-15 PROCEDURE — 80053 COMPREHEN METABOLIC PANEL: CPT | Performed by: GENERAL PRACTICE

## 2024-07-15 PROCEDURE — 71046 X-RAY EXAM CHEST 2 VIEWS: CPT | Mod: TC

## 2024-07-15 PROCEDURE — 85025 COMPLETE CBC W/AUTO DIFF WBC: CPT | Performed by: GENERAL PRACTICE

## 2024-07-15 NOTE — DISCHARGE INSTRUCTIONS
Your procedure is scheduled for:7/16/24          [facility] thru [facility] entrance at:0837    Nothing to eat or drink after midnight the night before your procedure.  Do not take any medications the morning of your procedure  Bring all your medications with you in the original pill bottles from pharmacy.  If you take blood thinners, ask your doctor if you should stop taking them.  Do not take metformin 24 hours prior to your procedure.  Adjust your insulin or other diabetes medications if needed.   Do your chlorhexidine wash the night before and morning of your procedure.  If you use a CPAP or BiPAP at home, please bring it with you the day of your procedure.  Make arrangements for someone you know to drive you home after your procedure. Taxi and Uber are not acceptable.         Any questions call the [facility] at [phone]

## 2024-07-16 ENCOUNTER — HOSPITAL ENCOUNTER (OUTPATIENT)
Facility: HOSPITAL | Age: 84
Discharge: HOME | End: 2024-07-16
Attending: STUDENT IN AN ORGANIZED HEALTH CARE EDUCATION/TRAINING PROGRAM | Admitting: STUDENT IN AN ORGANIZED HEALTH CARE EDUCATION/TRAINING PROGRAM
Payer: MEDICARE

## 2024-07-16 VITALS
HEIGHT: 66 IN | OXYGEN SATURATION: 97 % | RESPIRATION RATE: 20 BRPM | BODY MASS INDEX: 22.02 KG/M2 | SYSTOLIC BLOOD PRESSURE: 113 MMHG | DIASTOLIC BLOOD PRESSURE: 55 MMHG | WEIGHT: 137 LBS | HEART RATE: 70 BPM

## 2024-07-16 DIAGNOSIS — I35.0 MODERATE TO SEVERE AORTIC STENOSIS: ICD-10-CM

## 2024-07-16 DIAGNOSIS — I35.0 AORTIC VALVE STENOSIS, ETIOLOGY OF CARDIAC VALVE DISEASE UNSPECIFIED: ICD-10-CM

## 2024-07-16 DIAGNOSIS — I35.0 SEVERE AORTIC STENOSIS: ICD-10-CM

## 2024-07-16 DIAGNOSIS — I25.10 CAD (CORONARY ARTERY DISEASE): ICD-10-CM

## 2024-07-16 PROCEDURE — 93454 CORONARY ARTERY ANGIO S&I: CPT | Mod: 26,,, | Performed by: STUDENT IN AN ORGANIZED HEALTH CARE EDUCATION/TRAINING PROGRAM

## 2024-07-16 PROCEDURE — 63600175 PHARM REV CODE 636 W HCPCS: Performed by: STUDENT IN AN ORGANIZED HEALTH CARE EDUCATION/TRAINING PROGRAM

## 2024-07-16 PROCEDURE — 25500020 PHARM REV CODE 255: Performed by: STUDENT IN AN ORGANIZED HEALTH CARE EDUCATION/TRAINING PROGRAM

## 2024-07-16 PROCEDURE — C1894 INTRO/SHEATH, NON-LASER: HCPCS | Performed by: STUDENT IN AN ORGANIZED HEALTH CARE EDUCATION/TRAINING PROGRAM

## 2024-07-16 PROCEDURE — 25000003 PHARM REV CODE 250: Performed by: STUDENT IN AN ORGANIZED HEALTH CARE EDUCATION/TRAINING PROGRAM

## 2024-07-16 PROCEDURE — 99152 MOD SED SAME PHYS/QHP 5/>YRS: CPT | Mod: ,,, | Performed by: STUDENT IN AN ORGANIZED HEALTH CARE EDUCATION/TRAINING PROGRAM

## 2024-07-16 PROCEDURE — C1769 GUIDE WIRE: HCPCS | Performed by: STUDENT IN AN ORGANIZED HEALTH CARE EDUCATION/TRAINING PROGRAM

## 2024-07-16 PROCEDURE — 93454 CORONARY ARTERY ANGIO S&I: CPT | Performed by: STUDENT IN AN ORGANIZED HEALTH CARE EDUCATION/TRAINING PROGRAM

## 2024-07-16 PROCEDURE — 99152 MOD SED SAME PHYS/QHP 5/>YRS: CPT | Performed by: STUDENT IN AN ORGANIZED HEALTH CARE EDUCATION/TRAINING PROGRAM

## 2024-07-16 PROCEDURE — C1887 CATHETER, GUIDING: HCPCS | Performed by: STUDENT IN AN ORGANIZED HEALTH CARE EDUCATION/TRAINING PROGRAM

## 2024-07-16 RX ORDER — MIDAZOLAM HYDROCHLORIDE 1 MG/ML
INJECTION INTRAMUSCULAR; INTRAVENOUS
Status: DISCONTINUED | OUTPATIENT
Start: 2024-07-16 | End: 2024-07-16 | Stop reason: HOSPADM

## 2024-07-16 RX ORDER — HEPARIN SODIUM 10000 [USP'U]/ML
INJECTION, SOLUTION INTRAVENOUS; SUBCUTANEOUS
Status: DISCONTINUED | OUTPATIENT
Start: 2024-07-16 | End: 2024-07-16 | Stop reason: HOSPADM

## 2024-07-16 RX ORDER — SODIUM CHLORIDE 9 MG/ML
INJECTION, SOLUTION INTRAVENOUS CONTINUOUS
Status: DISCONTINUED | OUTPATIENT
Start: 2024-07-16 | End: 2024-07-16 | Stop reason: HOSPADM

## 2024-07-16 RX ORDER — SODIUM CHLORIDE 9 MG/ML
INJECTION, SOLUTION INTRAVENOUS CONTINUOUS
Status: ACTIVE | OUTPATIENT
Start: 2024-07-16 | End: 2024-07-16

## 2024-07-16 RX ORDER — VERAPAMIL HYDROCHLORIDE 2.5 MG/ML
INJECTION, SOLUTION INTRAVENOUS
Status: DISCONTINUED | OUTPATIENT
Start: 2024-07-16 | End: 2024-07-16 | Stop reason: HOSPADM

## 2024-07-16 RX ORDER — FENTANYL CITRATE 50 UG/ML
INJECTION, SOLUTION INTRAMUSCULAR; INTRAVENOUS
Status: DISCONTINUED | OUTPATIENT
Start: 2024-07-16 | End: 2024-07-16 | Stop reason: HOSPADM

## 2024-07-16 RX ADMIN — SODIUM CHLORIDE: 9 INJECTION, SOLUTION INTRAVENOUS at 09:07

## 2024-07-16 NOTE — Clinical Note
The catheter was inserted over the wire into the ostium   right coronary artery. An angiography was performed of the right coronary arteries. Multiple views were taken. The angiography was performed via power injection. The injected amount was 6 mL contrast at 3 mL/s. The PSI from the power injection was 300.

## 2024-07-16 NOTE — Clinical Note
The catheter insertion attempt was made into the ostium   left main. An angiography was performed Multiple views were taken. The catheter was unable to engage the area..

## 2024-07-16 NOTE — DISCHARGE INSTRUCTIONS
Post Radial Cath Discharge Instructions  Keep puncture site covered with current bandage for 24 hours.  You may shower in 24 hours. Do not take a tub bath or submerge the puncture site in water for 72 hours.   You may cover the site with a Band-Aid. Keep Band-Aid clean and dry at all times.    No lifting over 5 pounds with the arm your puncture site is on for 72 hours.  No strenuous activity such as bowling, tennis, etc for 72 hours  Do not operate lawnmower, motorcycle, chainsaw, or all terrain vehicle for 72 hours.  No blood pressure or tourniquets on affected arm for 72 hours.   The puncture site may be slightly bruised and sore following your procedure.   Drink 6-8 glasses of clear liquids during the next 24 hours to flush the dye out.     If Bleeding Occurs, DO NOT PANIC  Place 1 or 2 fingers over the puncture site and hold firm pressure to stop bleeding. You may be able to feel your pulse as you hold pressure  Lift you fingers after 5 minutes to see if the bleeding stopped.  Once has stopped, gently wipe the wrist area clean and cover with a bandage.  If the bleeding does not stop after 30 minutes, or if there is a large amount of bleeding or spurting, call 911! Do not drive yourself to the hospital.     Should any of the following occur, Contact your Physician Immediately:  Redness, inflamation, swelling, chills or fever, or discolred drainage at procedure site   Coldness, discoloration, ongoing numbness, severe pain, or swelling. Expect mild tingling of hand and tenderness at the puncture site for up to 3 days. If this persists or other symptoms develop, notify your physician

## 2024-08-13 ENCOUNTER — OFFICE VISIT (OUTPATIENT)
Dept: CARDIOLOGY | Facility: CLINIC | Age: 84
End: 2024-08-13
Payer: MEDICARE

## 2024-08-13 VITALS
DIASTOLIC BLOOD PRESSURE: 79 MMHG | WEIGHT: 134.94 LBS | HEART RATE: 84 BPM | SYSTOLIC BLOOD PRESSURE: 132 MMHG | BODY MASS INDEX: 22.11 KG/M2 | OXYGEN SATURATION: 98 %

## 2024-08-13 DIAGNOSIS — I49.1 PAC (PREMATURE ATRIAL CONTRACTION): ICD-10-CM

## 2024-08-13 DIAGNOSIS — I25.10 CORONARY ARTERY DISEASE, UNSPECIFIED VESSEL OR LESION TYPE, UNSPECIFIED WHETHER ANGINA PRESENT, UNSPECIFIED WHETHER NATIVE OR TRANSPLANTED HEART: ICD-10-CM

## 2024-08-13 DIAGNOSIS — I49.3 FREQUENT PVCS: ICD-10-CM

## 2024-08-13 DIAGNOSIS — I35.0 MODERATE TO SEVERE AORTIC STENOSIS: ICD-10-CM

## 2024-08-13 DIAGNOSIS — I10 ESSENTIAL HYPERTENSION: ICD-10-CM

## 2024-08-13 DIAGNOSIS — I35.0 SEVERE AORTIC STENOSIS: ICD-10-CM

## 2024-08-13 DIAGNOSIS — I35.0 AORTIC VALVE STENOSIS, ETIOLOGY OF CARDIAC VALVE DISEASE UNSPECIFIED: Primary | ICD-10-CM

## 2024-08-13 PROCEDURE — 1160F RVW MEDS BY RX/DR IN RCRD: CPT | Mod: CPTII,S$GLB,, | Performed by: GENERAL PRACTICE

## 2024-08-13 PROCEDURE — 3075F SYST BP GE 130 - 139MM HG: CPT | Mod: CPTII,S$GLB,, | Performed by: GENERAL PRACTICE

## 2024-08-13 PROCEDURE — 99999 PR PBB SHADOW E&M-EST. PATIENT-LVL III: CPT | Mod: PBBFAC,,, | Performed by: GENERAL PRACTICE

## 2024-08-13 PROCEDURE — 99214 OFFICE O/P EST MOD 30 MIN: CPT | Mod: S$GLB,,, | Performed by: GENERAL PRACTICE

## 2024-08-13 PROCEDURE — 3078F DIAST BP <80 MM HG: CPT | Mod: CPTII,S$GLB,, | Performed by: GENERAL PRACTICE

## 2024-08-13 PROCEDURE — 1159F MED LIST DOCD IN RCRD: CPT | Mod: CPTII,S$GLB,, | Performed by: GENERAL PRACTICE

## 2024-08-13 PROCEDURE — 1101F PT FALLS ASSESS-DOCD LE1/YR: CPT | Mod: CPTII,S$GLB,, | Performed by: GENERAL PRACTICE

## 2024-08-13 PROCEDURE — 3288F FALL RISK ASSESSMENT DOCD: CPT | Mod: CPTII,S$GLB,, | Performed by: GENERAL PRACTICE

## 2024-08-13 RX ORDER — HYDROXYZINE HYDROCHLORIDE 25 MG/1
25 TABLET, FILM COATED ORAL 3 TIMES DAILY PRN
Qty: 25 TABLET | Refills: 0 | Status: SHIPPED | OUTPATIENT
Start: 2024-08-13

## 2024-08-13 NOTE — PROGRESS NOTES
Subjective:    Patient ID:  Divine Ray is a 83 y.o. female who presents for follow-up of   Chief Complaint   Patient presents with    Hospital Follow Up     Angiogram 07/16/2024       HPI:      Here for followup heart cath.  Has apt Dr AZAR for TAVR EVAL.        Indications    Moderate to severe aortic stenosis [I35.0 (ICD-10-CM)]   Aortic valve stenosis, etiology of cardiac valve disease unspecified [I35.0 (ICD-10-CM)]   Severe aortic stenosis [I35.0 (ICD-10-CM)]     Summary         No angiographic evidence of obstructive disease.  There is mild nonobstructive coronary artery disease of LAD and diagonal.     The procedure log was documented by Documenter: Jaclyn Manzo RN and verified by Juaquin Crockett MD.     Date: 7/29/2024  Time: 6:45 PM    She is here to follow-up for echocardiogram moderate severe aortic stenosis aortic valve slightly worsened in March  There is mod severe stenosis. Aortic valve area by VTI is 0.67 cm². Aortic Valve VTI is 92.30 cm. Aortic valve peak velocity is 3.73 m/s. Mean gradient is 36 mmHg. Aortic Valve peak gradient is 56 mmHg. The dimensionless index is 0.21.              Her echo was reviewed.  She is had mild aortic stenosis and 19 and gotten gradually a little bit worse but is not ready be fixed.  Murmurs out of proportion to the grad on the echo but she is asymptomatic.  I am going to repeat the echo in 6 months or sooner if she has any symptoms.  I recommend to continue the metoprolol now because she has frequent PVCs will place a heart monitor to evaluate the PVCs.  She will report in any change of symptoms, may require further evaluation soone    Review of patient's allergies indicates:   Allergen Reactions    Anesthesia s/i-40 (propofol) [propofol] Nausea And Vomiting       Past Medical History:   Diagnosis Date    Aortic valve stenosis 2019 TRISTIN- Dr Rivera    Asymptomatic - since age 21    Hypertension      Past Surgical History:   Procedure Laterality Date     ANGIOGRAM, CORONARY, WITH LEFT HEART CATHETERIZATION N/A 7/16/2024    Procedure: Angiogram, Coronary, with Left Heart Cath;  Surgeon: Juaquin Crockett MD;  Location: [facility] CATH/EP LAB;  Service: Cardiology;  Laterality: N/A;    BREAST BIOPSY      Benign, calcification    COLONOSCOPY      HYSTERECTOMY      TONSILLECTOMY       Social History     Tobacco Use    Smoking status: Never    Smokeless tobacco: Never   Substance Use Topics    Alcohol use: Not Currently    Drug use: Never     Family History   Problem Relation Name Age of Onset    Heart disease Mother      Hypertension Father      Psoriasis Neg Hx      Lupus Neg Hx      Eczema Neg Hx      Melanoma Neg Hx          Review of Systems:   Constitution: Negative for diaphoresis and fever.   HEENT: Negative for nosebleeds.    Cardiovascular: Negative for chest pain       No dyspnea on exertion       No leg swelling        No palpitations  Respiratory: Negative for shortness of breath and wheezing.    Hematologic/Lymphatic: Negative for bleeding problem. Does not bruise/bleed easily.   Skin: Negative for color change and rash.   Musculoskeletal: Negative for falls and myalgias.   Gastrointestinal: Negative for hematemesis and hematochezia.   Genitourinary: Negative for hematuria.   Neurological: Negative for dizziness and light-headedness.   Psychiatric/Behavioral: Negative for altered mental status and memory loss.          Objective:        Vitals:    08/13/24 1407   BP: 132/79   Pulse: 84       Lab Results   Component Value Date    WBC 5.45 07/15/2024    HGB 13.7 07/15/2024    HGB 13.7 07/15/2024    HCT 42.2 07/15/2024     07/15/2024    CHOL 211 (H) 04/14/2022    TRIG 84 04/14/2022    HDL 75 04/14/2022    ALT 13 07/15/2024    AST 12 07/15/2024     07/15/2024    K 3.5 07/15/2024     07/15/2024    CREATININE 0.9 07/15/2024    BUN 16 07/15/2024    CO2 30 (H) 07/15/2024    TSH 1.860 08/30/2023    INR 0.9 07/15/2024        ECHOCARDIOGRAM  RESULTS  Results for orders placed during the hospital encounter of 06/05/24    Echo Saline Bubble? No    Interpretation Summary    Left Ventricle: The left ventricle is normal in size. Normal wall thickness. There is normal systolic function with a visually estimated ejection fraction of 60 - 65%. Grade I diastolic dysfunction. E/A ratio is 0.65. Average E/e' ratio is 10.00.    Right Ventricle: Normal right ventricular cavity size. Systolic function is borderline low. TAPSE is 1.76 cm.    Left Atrium: Left atrium is moderately dilated.    Aortic Valve: The aortic valve is a trileaflet valve. Moderately calcified left, right and noncoronary cusps. Moderately restricted motion. There is severe stenosis. Aortic valve area by VTI is 0.63 cm². Aortic valve area by velocity is 0.65 cm². Aortic valve peak velocity is 4.21 m/s. Mean gradient is 46 mmHg. Aortic Valve peak gradient is 71 mmHg. The dimensionless index is 0.20. There is mild aortic regurgitation.    Mitral Valve: There is moderate regurgitation.        CURRENT/PREVIOUS VISIT EKG  Results for orders placed or performed during the hospital encounter of 07/15/24   EKG 12-lead    Collection Time: 07/15/24  9:31 AM   Result Value Ref Range    QRS Duration 86 ms    [facility] QTC Calculation 464 ms    Narrative    Test Reason : Z01.818,    Vent. Rate : 082 BPM     Atrial Rate : 082 BPM     P-R Int : 160 ms          QRS Dur : 086 ms      QT Int : 398 ms       P-R-T Axes : 050 009 061 degrees     QTc Int : 464 ms    Sinus rhythm with occasional Premature ventricular complexes  Possible Left atrial enlargement  Minimal voltage criteria for LVH, may be normal variant ( R in aVL )  Cannot rule out Anterior infarct ,age undetermined  Abnormal ECG  When compared with ECG of 16-OCT-2023 13:52,  Premature ventricular complexes are now Present  Aberrant conduction is no longer Present  Nonspecific T wave abnormality now evident in Inferior leads  Nonspecific T wave abnormality now  evident in Anterior-lateral leads  Confirmed by Thomas Thapa MD ([id number]) on 7/25/2024 4:40:25 PM    Referred By:             Confirmed By:Thomas Thapa MD     No valid procedures specified.   Results for orders placed during the hospital encounter of 08/30/23    Nuclear Stress Test    Interpretation Summary    The ECG portion of the study is negative for ischemia.    The patient reported no chest pain during the stress test.    During stress, rare PACs are noted. , During stress, rare PVCs are noted.    The nuclear portion of this study will be reported separately.      Physical Exam:  CONSTITUTIONAL: No fever, no chills  HEENT: Normocephalic, atraumatic,pupils reactive to light                 NECK:  No JVD no carotid bruit  CVS: S1S2+, RRR, no murmurs,   LUNGS: Clear  ABDOMEN: Soft, NT, BS+  EXTREMITIES: No cyanosis, edema  : No coto catheter  NEURO: AAO X 3  PSY: Normal affect      Medication List with Changes/Refills   New Medications    HYDROXYZINE HCL (ATARAX) 25 MG TABLET    Take 1 tablet (25 mg total) by mouth 3 (three) times daily as needed for Itching.   Current Medications    AMLODIPINE (NORVASC) 5 MG TABLET    Take 1 tablet (5 mg total) by mouth once daily.    CLOBETASOL 0.05% (TEMOVATE) 0.05 % OINT    Apply topically daily as needed (itching).    LOSARTAN (COZAAR) 100 MG TABLET    Take 1 tablet (100 mg total) by mouth once daily.             Assessment:       1. Aortic valve stenosis, etiology of cardiac valve disease unspecified    2. Moderate to severe aortic stenosis    3. Severe aortic stenosis    4. Coronary artery disease, unspecified vessel or lesion type, unspecified whether angina present, unspecified whether native or transplanted heart    5. Essential hypertension    6. Frequent PVCs    7. Frequent PAC         Plan:     Problem List Items Addressed This Visit          Cardiac/Vascular    Aortic stenosis (Chronic)    Essential hypertension    Moderate to severe aortic stenosis     Frequent PAC     Other Visit Diagnoses       Aortic valve stenosis, etiology of cardiac valve disease unspecified    -  Primary    Coronary artery disease, unspecified vessel or lesion type, unspecified whether angina present, unspecified whether native or transplanted heart        Frequent PVCs              AORTIC STENOSIS discussed for TAVR EVAL F/U DR AZAR      Follow up in about 3 months (around 11/13/2024).    The patients questions were answered, they verbalized understanding, and agreed with the treatment plan.     ESME MARIN MD  [facility]

## 2024-10-14 PROBLEM — I35.0 SEVERE AORTIC VALVE STENOSIS: Status: ACTIVE | Noted: 2023-08-30

## 2024-10-14 PROBLEM — I50.30 NYHA CLASS 2 HEART FAILURE WITH PRESERVED EJECTION FRACTION: Status: ACTIVE | Noted: 2024-10-14

## 2024-10-16 DIAGNOSIS — R06.02 SOB (SHORTNESS OF BREATH): ICD-10-CM

## 2024-10-16 DIAGNOSIS — I50.30 NYHA CLASS 2 HEART FAILURE WITH PRESERVED EJECTION FRACTION: ICD-10-CM

## 2024-10-16 DIAGNOSIS — F41.9 ANXIETY: ICD-10-CM

## 2024-10-16 DIAGNOSIS — I10 ESSENTIAL HYPERTENSION: ICD-10-CM

## 2024-10-16 DIAGNOSIS — I35.0 SEVERE AORTIC VALVE STENOSIS: Primary | ICD-10-CM

## 2024-10-21 ENCOUNTER — TELEPHONE (OUTPATIENT)
Dept: CARDIOLOGY | Facility: CLINIC | Age: 84
End: 2024-10-21
Payer: MEDICARE

## 2024-10-21 NOTE — TELEPHONE ENCOUNTER
Patient said she had a reaction to Zio and took it off and sending it back. Pt said she is having a rash and not letting it irritate her more.

## 2024-10-21 NOTE — TELEPHONE ENCOUNTER
----- Message from Rahat Miller sent at 10/21/2024 11:06 AM CDT -----  Contact: self    ----- Message -----  From: Suraj Nix  Sent: 10/21/2024   8:32 AM CDT  To: Alanis Guzman Staff    Type: Needs Medical Advice  Who Called:  Patient  Best Call Back Number: [phone]    Additional Information: Pt states she is having a reaction to stickers on heart monitor has a rash and will not be wearing it.Please call back and advise

## 2024-10-31 ENCOUNTER — OFFICE VISIT (OUTPATIENT)
Dept: CARDIOLOGY | Facility: CLINIC | Age: 84
End: 2024-10-31
Payer: MEDICARE

## 2024-10-31 VITALS
OXYGEN SATURATION: 98 % | WEIGHT: 136 LBS | DIASTOLIC BLOOD PRESSURE: 86 MMHG | BODY MASS INDEX: 22.63 KG/M2 | HEART RATE: 72 BPM | SYSTOLIC BLOOD PRESSURE: 144 MMHG

## 2024-10-31 DIAGNOSIS — I10 ESSENTIAL HYPERTENSION: ICD-10-CM

## 2024-10-31 DIAGNOSIS — Z95.2 S/P TAVR (TRANSCATHETER AORTIC VALVE REPLACEMENT): Primary | ICD-10-CM

## 2024-10-31 DIAGNOSIS — I35.0 SEVERE AORTIC STENOSIS: ICD-10-CM

## 2024-10-31 DIAGNOSIS — I25.10 CORONARY ARTERY DISEASE, UNSPECIFIED VESSEL OR LESION TYPE, UNSPECIFIED WHETHER ANGINA PRESENT, UNSPECIFIED WHETHER NATIVE OR TRANSPLANTED HEART: ICD-10-CM

## 2024-10-31 DIAGNOSIS — I49.3 FREQUENT PVCS: ICD-10-CM

## 2024-10-31 PROCEDURE — 99999 PR PBB SHADOW E&M-EST. PATIENT-LVL III: CPT | Mod: PBBFAC,,, | Performed by: GENERAL PRACTICE

## 2024-10-31 PROCEDURE — 1160F RVW MEDS BY RX/DR IN RCRD: CPT | Mod: CPTII,S$GLB,, | Performed by: GENERAL PRACTICE

## 2024-10-31 PROCEDURE — 3077F SYST BP >= 140 MM HG: CPT | Mod: CPTII,S$GLB,, | Performed by: GENERAL PRACTICE

## 2024-10-31 PROCEDURE — 1159F MED LIST DOCD IN RCRD: CPT | Mod: CPTII,S$GLB,, | Performed by: GENERAL PRACTICE

## 2024-10-31 PROCEDURE — 1111F DSCHRG MED/CURRENT MED MERGE: CPT | Mod: CPTII,S$GLB,, | Performed by: GENERAL PRACTICE

## 2024-10-31 PROCEDURE — 3079F DIAST BP 80-89 MM HG: CPT | Mod: CPTII,S$GLB,, | Performed by: GENERAL PRACTICE

## 2024-10-31 PROCEDURE — 3288F FALL RISK ASSESSMENT DOCD: CPT | Mod: CPTII,S$GLB,, | Performed by: GENERAL PRACTICE

## 2024-10-31 PROCEDURE — 99214 OFFICE O/P EST MOD 30 MIN: CPT | Mod: S$GLB,,, | Performed by: GENERAL PRACTICE

## 2024-10-31 PROCEDURE — 1101F PT FALLS ASSESS-DOCD LE1/YR: CPT | Mod: CPTII,S$GLB,, | Performed by: GENERAL PRACTICE

## 2024-10-31 RX ORDER — AMLODIPINE BESYLATE 2.5 MG/1
2.5 TABLET ORAL DAILY
Qty: 90 TABLET | Refills: 3 | Status: SHIPPED | OUTPATIENT
Start: 2024-10-31 | End: 2025-10-31

## 2024-11-11 ENCOUNTER — TELEPHONE (OUTPATIENT)
Dept: CARDIOLOGY | Facility: CLINIC | Age: 84
End: 2024-11-11
Payer: MEDICARE

## 2024-11-11 NOTE — TELEPHONE ENCOUNTER
----- Message from Che sent at 11/11/2024  9:56 AM CST -----  Contact: Patient  Type:  Needs Medical Advice    Who Called:   Patient    Would the patient rather a call back or a response via [facility]?   Call back  Best Call Back Number:   [phone]    Additional Information:   States she would like to speak with someone about her appointment tomorrow, 11/12 - states she has a request - please call - thank you

## 2024-11-12 ENCOUNTER — HOSPITAL ENCOUNTER (OUTPATIENT)
Dept: CARDIOLOGY | Facility: HOSPITAL | Age: 84
Discharge: HOME | End: 2024-11-12
Attending: NURSE PRACTITIONER
Payer: MEDICARE

## 2024-11-12 VITALS — WEIGHT: 136 LBS | HEIGHT: 65 IN | BODY MASS INDEX: 22.66 KG/M2

## 2024-11-12 DIAGNOSIS — Z95.2 S/P TAVR (TRANSCATHETER AORTIC VALVE REPLACEMENT): ICD-10-CM

## 2024-11-12 LAB
AORTIC ROOT ANNULUS: 2.7 CM
APICAL FOUR CHAMBER EJECTION FRACTION: 69 %
APICAL TWO CHAMBER EJECTION FRACTION: 52 %
AV INDEX (PROSTH): 0.65
AV MEAN GRADIENT: 2 MMHG
AV PEAK GRADIENT: 4.8 MMHG
AV REGURGITATION PRESSURE HALF TIME: 1681 MS
AV VALVE AREA BY VELOCITY RATIO: 2.6 CM²
AV VALVE AREA: 2 CM²
AV VELOCITY RATIO: 0.82
BSA FOR ECHO PROCEDURE: 1.68 M2
CV ECHO LV RWT: 0.65 CM
DOP CALC AO PEAK VEL: 1.1 M/S
DOP CALC AO VTI: 31.3 CM
DOP CALC LVOT AREA: 3.1 CM2
DOP CALC LVOT DIAMETER: 2 CM
DOP CALC LVOT PEAK VEL: 0.9 M/S
DOP CALC LVOT STROKE VOLUME: 63.4 CM3
DOP CALCLVOT PEAK VEL VTI: 20.2 CM
E WAVE DECELERATION TIME: 162 MSEC
E/A RATIO: 0.44
E/E' RATIO: 6 M/S
ECHO LV POSTERIOR WALL: 1.2 CM (ref 0.6–1.1)
FRACTIONAL SHORTENING: 37.8 % (ref 28–44)
INTERVENTRICULAR SEPTUM: 0.8 CM (ref 0.6–1.1)
LEFT INTERNAL DIMENSION IN SYSTOLE: 2.3 CM (ref 2.1–4)
LEFT VENTRICLE DIASTOLIC VOLUME INDEX: 34.6 ML/M2
LEFT VENTRICLE DIASTOLIC VOLUME: 58.13 ML
LEFT VENTRICLE END DIASTOLIC VOLUME APICAL 2 CHAMBER: 30.3 ML
LEFT VENTRICLE END DIASTOLIC VOLUME APICAL 4 CHAMBER: 27.2 ML
LEFT VENTRICLE MASS INDEX: 67 G/M2
LEFT VENTRICLE SYSTOLIC VOLUME INDEX: 10.8 ML/M2
LEFT VENTRICLE SYSTOLIC VOLUME: 18.12 ML
LEFT VENTRICULAR INTERNAL DIMENSION IN DIASTOLE: 3.7 CM (ref 3.5–6)
LEFT VENTRICULAR MASS: 112.5 G
LV LATERAL E/E' RATIO: 5.63 M/S
LV SEPTAL E/E' RATIO: 6.43 M/S
LVED V (TEICH): 58.13 ML
LVES V (TEICH): 18.12 ML
LVOT MG: 2 MMHG
LVOT MV: 0.61 CM/S
MR PISA EROA: 1.84 CM2
MV PEAK A VEL: 1.02 M/S
MV PEAK E VEL: 0.45 M/S
MV STENOSIS PRESSURE HALF TIME: 84 MS
MV VALVE AREA P 1/2 METHOD: 2.62 CM2
OHS LV EJECTION FRACTION SIMPSONS BIPLANE MOD: 61 %
PISA AR MAX VEL: 3.69 M/S
PISA MRMAX VEL: 5.24 M/S
PISA RADIUS: 1.6 CM
PISA TR MAX VEL: 2.25 M/S
PISA VN NYQUIST MS: 0.6 M/S
PISA VN NYQUIST: 0.6 M/S
RIGHT VENTRICULAR END-DIASTOLIC DIMENSION: 1.8 CM
TDI LATERAL: 0.08 M/S
TDI SEPTAL: 0.07 M/S
TDI: 0.08 M/S
TR MAX PG: 20 MMHG
Z-SCORE OF LEFT VENTRICULAR DIMENSION IN END DIASTOLE: -2.37
Z-SCORE OF LEFT VENTRICULAR DIMENSION IN END SYSTOLE: -1.84

## 2024-11-12 PROCEDURE — 93306 TTE W/DOPPLER COMPLETE: CPT

## 2024-11-12 PROCEDURE — 93306 TTE W/DOPPLER COMPLETE: CPT | Mod: 26,,, | Performed by: GENERAL PRACTICE

## 2024-11-25 ENCOUNTER — PATIENT MESSAGE (OUTPATIENT)
Dept: ADMINISTRATIVE | Facility: HOSPITAL | Age: 84
End: 2024-11-25
Payer: MEDICARE

## 2024-12-04 ENCOUNTER — TELEPHONE (OUTPATIENT)
Dept: CARDIOLOGY | Facility: CLINIC | Age: 84
End: 2024-12-04
Payer: MEDICARE

## 2024-12-04 NOTE — TELEPHONE ENCOUNTER
----- Message from Eboni sent at 12/4/2024  2:08 PM CST -----  Contact: PT  Type: Needs Medical Advice    Who Called: PT  Best Call Back Number: [phone]  Additional  Information: PT asking to speak with nurse to  find out if she can take regular aspirin state she has a head cold.  Please Advise- Thank you

## 2024-12-06 ENCOUNTER — TELEPHONE (OUTPATIENT)
Dept: FAMILY MEDICINE | Facility: CLINIC | Age: 84
End: 2024-12-06
Payer: MEDICARE

## 2024-12-06 NOTE — TELEPHONE ENCOUNTER
I spoke with pt via phone she states that she has been sick since Tuesday/ Wednesday. She states that she is congested but can breathe out of her nose. She states that she does not want to come out in the weather. She states that her 98.1 temp ( fluctuating). She states that she will call the office back on Monday if she is still feeling bad and will make an appt. No further questions at this time.       ----- Message from Cherise sent at 12/6/2024 11:08 AM CST -----  Type: Needs Medical Advice  Who Called:  pt  Symptoms (please be specific):  very bad cold  How long has patient had these symptoms:  since tuesda  Pharmacy name and phone #:    Best Call Back Number: [phone]    [id number] [place] - [address] AT [facility] [address]  [facility] [place] [address]  Phone: [phone] Fax: [phone]      Additional Information: pt would like some medication sent in for her   She has a very bad cold  Please call to advise

## 2024-12-09 ENCOUNTER — TELEPHONE (OUTPATIENT)
Dept: CARDIOLOGY | Facility: CLINIC | Age: 84
End: 2024-12-09
Payer: MEDICARE

## 2024-12-09 NOTE — TELEPHONE ENCOUNTER
----- Message from Lesly sent at 12/9/2024 10:00 AM CST -----  Contact: Self  Type: Needs Medical Advice  Who Called:  Patient   Best Call Back Number: [phone]    Additional Information: Pt is calling in regards to a message she received in regards to making a follow up appt with Dr Thapa, stated she is not seeing Dr Quiñones anymore, and wants to see if she needs a f/u appt since she saw him on 10/31. Can we please check on this and call back to advise. Thank You.

## 2025-02-21 ENCOUNTER — TELEPHONE (OUTPATIENT)
Dept: FAMILY MEDICINE | Facility: CLINIC | Age: 85
End: 2025-02-21
Payer: MEDICARE

## 2025-02-21 NOTE — TELEPHONE ENCOUNTER
Lisa Campo Staff     ----- Message from Lisa sent at 2/21/2025 12:31 PM CST -----  Type: Needs Medical Advice  Who Called:  pt  Best Call Back Number: [phone]  Additional Information: pt is calling in regards to needing to see if she needs an appt for her blood pressure to be checked. Pt states it is not very high but is not running normally for her. Needs to get a call back to let her know if she will need an appt or if she can just come in. Please call back and advise. Thanks!

## 2025-03-19 ENCOUNTER — PATIENT MESSAGE (OUTPATIENT)
Dept: ADMINISTRATIVE | Facility: HOSPITAL | Age: 85
End: 2025-03-19
Payer: MEDICARE

## 2025-03-24 ENCOUNTER — TELEPHONE (OUTPATIENT)
Dept: FAMILY MEDICINE | Facility: CLINIC | Age: 85
End: 2025-03-24
Payer: MEDICARE

## 2025-03-24 DIAGNOSIS — I10 ESSENTIAL HYPERTENSION: Primary | ICD-10-CM

## 2025-03-24 DIAGNOSIS — Z00.00 ENCOUNTER FOR MEDICARE ANNUAL WELLNESS EXAM: ICD-10-CM

## 2025-03-24 NOTE — TELEPHONE ENCOUNTER
Merissa Mg Staff       ----- Message from Merissa sent at 3/24/2025  3:21 PM CDT -----  Contact: self  Type:  Needs Medical Advice    Who Called: pt   Symptoms (please be specific):    How long has patient had these symptoms:    Pharmacy name and phone #:    Would the patient rather a call back or a response via [facility]? Call  Best Call Back Number: [phone]  Additional Information: pt ask if she needs to do lab work before her appt .   Please call back to advise. Thanks!

## 2025-03-24 NOTE — TELEPHONE ENCOUNTER
Patient has follow up appointment scheduled for the date of 4/2/25.  Requesting to know if lab are needed in relation to upcoming appointment.  Lab orders pended, please review & make any necessary changes.  Thank you.

## 2025-03-24 NOTE — TELEPHONE ENCOUNTER
Call placed to patient. No answer received. Left message requesting return call to office.        Jose Manuel Landrum MD to Me  (Selected Message)        3/24/25  4:15 PM  Signed, thanks

## 2025-03-26 ENCOUNTER — LAB VISIT (OUTPATIENT)
Dept: LAB | Facility: HOSPITAL | Age: 85
End: 2025-03-26
Attending: STUDENT IN AN ORGANIZED HEALTH CARE EDUCATION/TRAINING PROGRAM
Payer: MEDICARE

## 2025-03-26 DIAGNOSIS — I10 ESSENTIAL HYPERTENSION: ICD-10-CM

## 2025-03-26 LAB
ABSOLUTE EOSINOPHIL (SMH): 0.15 K/UL
ABSOLUTE MONOCYTE (SMH): 0.32 K/UL (ref 0.3–1)
ABSOLUTE NEUTROPHIL COUNT (SMH): 2.9 K/UL (ref 1.8–7.7)
ALBUMIN SERPL-MCNC: 4.1 G/DL (ref 3.5–5.2)
ALBUMIN/CREAT UR: 9.8 UG/MG
ALP SERPL-CCNC: 73 UNIT/L (ref 55–135)
ALT SERPL-CCNC: 13 UNIT/L (ref 10–44)
ANION GAP (SMH): 8 MMOL/L (ref 8–16)
AST SERPL-CCNC: 12 UNIT/L (ref 10–40)
BASOPHILS # BLD AUTO: 0.04 K/UL
BASOPHILS NFR BLD AUTO: 0.9 %
BILIRUB SERPL-MCNC: 0.5 MG/DL (ref 0.1–1)
BUN SERPL-MCNC: 16 MG/DL (ref 8–23)
CALCIUM SERPL-MCNC: 9.9 MG/DL (ref 8.7–10.5)
CHLORIDE SERPL-SCNC: 107 MMOL/L (ref 95–110)
CHOLEST SERPL-MCNC: 174 MG/DL (ref 120–199)
CHOLEST/HDLC SERPL: 2.3 {RATIO} (ref 2–5)
CO2 SERPL-SCNC: 27 MMOL/L (ref 23–29)
CREAT SERPL-MCNC: 0.8 MG/DL (ref 0.5–1.4)
CREAT UR-MCNC: 130.7 MG/DL (ref 15–325)
ERYTHROCYTE [DISTWIDTH] IN BLOOD BY AUTOMATED COUNT: 13.4 % (ref 11.5–14.5)
GFR SERPLBLD CREATININE-BSD FMLA CKD-EPI: >60 ML/MIN/1.73/M2
GLUCOSE SERPL-MCNC: 90 MG/DL (ref 70–110)
HCT VFR BLD AUTO: 42.8 % (ref 37–48.5)
HDLC SERPL-MCNC: 76 MG/DL (ref 40–75)
HDLC SERPL: 43.7 % (ref 20–50)
HGB BLD-MCNC: 13.8 GM/DL (ref 12–16)
IMM GRANULOCYTES # BLD AUTO: 0.01 K/UL (ref 0–0.04)
IMM GRANULOCYTES NFR BLD AUTO: 0.2 % (ref 0–0.5)
LDLC SERPL CALC-MCNC: 83.6 MG/DL (ref 63–159)
LYMPHOCYTES # BLD AUTO: 1.03 K/UL (ref 1–4.8)
MCH RBC QN AUTO: 28.5 PG (ref 27–31)
MCHC RBC AUTO-ENTMCNC: 32.2 G/DL (ref 32–36)
MCV RBC AUTO: 88 FL (ref 82–98)
MICROALBUMIN UR-MCNC: 12.8 UG/ML
NONHDLC SERPL-MCNC: 98 MG/DL
NUCLEATED RBC (/100WBC) (SMH): 0 /100 WBC
PLATELET # BLD AUTO: 191 K/UL (ref 150–450)
PMV BLD AUTO: 10.6 FL (ref 9.2–12.9)
POTASSIUM SERPL-SCNC: 4 MMOL/L (ref 3.5–5.1)
PROT SERPL-MCNC: 6.9 GM/DL (ref 6–8.4)
RBC # BLD AUTO: 4.84 M/UL (ref 4–5.4)
RELATIVE EOSINOPHIL (SMH): 3.4 % (ref 0–8)
RELATIVE LYMPHOCYTE (SMH): 23.3 % (ref 18–48)
RELATIVE MONOCYTE (SMH): 7.2 % (ref 4–15)
RELATIVE NEUTROPHIL (SMH): 65 % (ref 38–73)
SODIUM SERPL-SCNC: 142 MMOL/L (ref 136–145)
TRIGL SERPL-MCNC: 72 MG/DL (ref 30–150)
WBC # BLD AUTO: 4.42 K/UL (ref 3.9–12.7)

## 2025-03-26 PROCEDURE — 80053 COMPREHEN METABOLIC PANEL: CPT

## 2025-03-26 PROCEDURE — 85025 COMPLETE CBC W/AUTO DIFF WBC: CPT

## 2025-03-26 PROCEDURE — 82570 ASSAY OF URINE CREATININE: CPT

## 2025-03-26 PROCEDURE — 80061 LIPID PANEL: CPT

## 2025-03-26 PROCEDURE — 36415 COLL VENOUS BLD VENIPUNCTURE: CPT

## 2025-04-02 ENCOUNTER — OFFICE VISIT (OUTPATIENT)
Dept: FAMILY MEDICINE | Facility: CLINIC | Age: 85
End: 2025-04-02
Payer: MEDICARE

## 2025-04-02 VITALS
HEART RATE: 73 BPM | BODY MASS INDEX: 22.66 KG/M2 | SYSTOLIC BLOOD PRESSURE: 146 MMHG | DIASTOLIC BLOOD PRESSURE: 80 MMHG | RESPIRATION RATE: 18 BRPM | HEIGHT: 65 IN | WEIGHT: 136 LBS | OXYGEN SATURATION: 98 %

## 2025-04-02 DIAGNOSIS — I11.0 HYPERTENSIVE HEART DISEASE WITH HEART FAILURE: ICD-10-CM

## 2025-04-02 DIAGNOSIS — Z00.00 ROUTINE GENERAL MEDICAL EXAMINATION AT A HEALTH CARE FACILITY: Primary | ICD-10-CM

## 2025-04-02 DIAGNOSIS — I35.0 SEVERE AORTIC STENOSIS: ICD-10-CM

## 2025-04-02 DIAGNOSIS — I10 ESSENTIAL HYPERTENSION: ICD-10-CM

## 2025-04-02 DIAGNOSIS — L30.9 DERMATITIS: ICD-10-CM

## 2025-04-02 PROCEDURE — 99999 PR PBB SHADOW E&M-EST. PATIENT-LVL III: CPT | Mod: PBBFAC,,, | Performed by: STUDENT IN AN ORGANIZED HEALTH CARE EDUCATION/TRAINING PROGRAM

## 2025-04-02 RX ORDER — TRIAMCINOLONE ACETONIDE 1 MG/G
CREAM TOPICAL 2 TIMES DAILY
Qty: 80 G | Refills: 0 | Status: SHIPPED | OUTPATIENT
Start: 2025-04-02 | End: 2025-04-09

## 2025-04-02 RX ORDER — LOSARTAN POTASSIUM 50 MG/1
50 TABLET ORAL DAILY
Qty: 90 TABLET | Refills: 3 | Status: SHIPPED | OUTPATIENT
Start: 2025-04-02

## 2025-04-03 NOTE — PROGRESS NOTES
Patient ID: Divine Ray is a 84 y.o. female.    Chief Complaint: Annual Exam (Fasting labs)    History of Present Illness    CHIEF COMPLAINT:  Patient presents today for follow up after valve replacement    RESPIRATORY SYMPTOMS:  She reports a two-week history of cough with clear phlegm production, runny nose, and morning congestion. She denies fever and chest pain. She sleeps on one foam pillow without breathing difficulties.    CARDIOVASCULAR:  She currently takes baby aspirin after discontinuing Plavix due to significant bruising. She takes Losartan 25mg daily (50mg tablets cut in half).    DERMATOLOGIC:  She reports a small lump on her eyelid that developed after a recent eye appointment, initially swollen but now improving. She also notes multiple blistering lesions that appeared three weeks ago in various locations, currently resolving.    GI CONCERNS:  She reports intermittent hemorrhoids without bleeding.          Physical Exam    Vitals: Blood pressure: 146/80.  General: No acute distress. Well-developed. Well-nourished.  Eyes: EOMI. Sclerae anicteric. Lump on eyelid appears normal.  HENT: Normocephalic. Atraumatic. Nares patent. Moist oral mucosa.  Cardiovascular: Regular rate. Regular rhythm. No murmurs. No rubs. No gallops. Normal S1, S2.  Respiratory: Normal respiratory effort. Clear to auscultation bilaterally. No rales. No rhonchi. No wheezing. Lungs sound good.  Musculoskeletal: No  obvious deformity.  Extremities: No lower extremity edema.  Neurological: Alert & oriented x3. No slurred speech. Normal gait.  Psychiatric: Normal mood. Normal affect. Good insight. Good judgment.  Skin: Warm. Dry. No rash.         Assessment & Plan    I10 Essential hypertension  I35.0 Severe aortic stenosis  I11.0 Hypertensive heart disease with heart failure  L30.9 Dermatitis  Z00.00 Routine general medical exam at a health care facility  I50.30 Unspecified diastolic (congestive) heart failure  I49.1 Atrial  premature depolarization    IMPRESSION:  - Assessed cardiovascular status post-valve replacement, noting good activity level and absence of concerning symptoms.  - Evaluated recent onset cough, likely related to sinuses given associated runny nose and morning congestion.  - Reviewed BP management, maintaining current regimen given acceptable in-office reading of 146/80.  - Analyzed recent lab results, noting normal electrolytes, kidney and liver function, blood counts, and cholesterol levels.  - Determined need for follow-up echocardiogram approximately 6 months post-valve replacement.  - Considered but deferred tetanus vaccination due to patient preference.    ESSENTIAL HYPERTENSION:  - Increased Losartan dosage from 25 mg to 50 mg daily.  - Advised the patient to monitor blood pressure at home regularly.    SEVERE AORTIC STENOSIS:  - Ordered an echocardiogram to assess heart valve function.  - Educated the patient about the potential for increased blood pressure following valve replacement due to improved cardiac output.    DERMATITIS:  - Prescribed topical steroid cream for skin rash, to be applied as needed.    ROUTINE GENERAL MEDICAL EXAMINATION AT A HEALTH CARE FACILITY:  - Educated the patient on the prevalence and risks associated with tetanus in soil, and recommended reviewing additional information on the topic.    UNSPECIFIED DIASTOLIC (CONGESTIVE) HEART FAILURE:  - Patient reports a 2-week cough with morning congestion and clear sputum, but denies chest pain, orthopnea, and sleeps with one pillow.  - Lungs sound clear on auscultation.  - Blood pressure measured at 146/80.  - Losartan prescription changed to 50mg pills to avoid cutting.    ATRIAL PREMATURE DEPOLARIZATION:  - Patient is taking baby aspirin as prescribed by Dr. Thapa, who has discontinued Plavix    LIFESTYLE CHANGES:  - Continue current level of physical activity, including participation in ladies' crew and gardening.          Divine was  seen today for annual exam.    Diagnoses and all orders for this visit:    Routine general medical examination at a health care facility    Essential hypertension  -     losartan (COZAAR) 50 MG tablet; Take 1 tablet (50 mg total) by mouth once daily.    Aortic stenosis  -     Echo; Future    Hypertensive heart disease with heart failure    Dermatitis  -     triamcinolone acetonide 0.1% (KENALOG) 0.1 % cream; Apply topically 2 (two) times daily. for 7 days      Increase losartan.   Recheck 2 weeks.       No follow-ups on file.    This note was generated with the assistance of ambient listening technology. Verbal consent was obtained by the patient and accompanying visitor(s) for the recording of patient appointment to facilitate this note. I attest to having reviewed and edited the generated note for accuracy, though some syntax or spelling errors may persist. Please contact the author of this note for any clarification.

## 2025-04-15 ENCOUNTER — CLINICAL SUPPORT (OUTPATIENT)
Dept: CARDIOLOGY | Facility: CLINIC | Age: 85
End: 2025-04-15
Payer: MEDICARE

## 2025-04-15 ENCOUNTER — HOSPITAL ENCOUNTER (OUTPATIENT)
Dept: CARDIOLOGY | Facility: HOSPITAL | Age: 85
Discharge: HOME | End: 2025-04-15
Attending: STUDENT IN AN ORGANIZED HEALTH CARE EDUCATION/TRAINING PROGRAM
Payer: MEDICARE

## 2025-04-15 ENCOUNTER — TELEPHONE (OUTPATIENT)
Dept: FAMILY MEDICINE | Facility: CLINIC | Age: 85
End: 2025-04-15
Payer: MEDICARE

## 2025-04-15 ENCOUNTER — RESULTS FOLLOW-UP (OUTPATIENT)
Dept: FAMILY MEDICINE | Facility: CLINIC | Age: 85
End: 2025-04-15

## 2025-04-15 VITALS
DIASTOLIC BLOOD PRESSURE: 81 MMHG | HEIGHT: 65 IN | WEIGHT: 136 LBS | SYSTOLIC BLOOD PRESSURE: 169 MMHG | OXYGEN SATURATION: 97 % | BODY MASS INDEX: 22.66 KG/M2 | HEART RATE: 47 BPM

## 2025-04-15 DIAGNOSIS — I10 ESSENTIAL HYPERTENSION: Primary | ICD-10-CM

## 2025-04-15 DIAGNOSIS — I35.0 SEVERE AORTIC STENOSIS: ICD-10-CM

## 2025-04-15 LAB
AORTIC ROOT ANNULUS: 2.62 CM
AORTIC VALVE CUSP SEPERATION: 1.3 CM
APICAL FOUR CHAMBER EJECTION FRACTION: 66 %
APICAL TWO CHAMBER EJECTION FRACTION: 72 %
AV INDEX (PROSTH): 0.95
AV MEAN GRADIENT: 3 MMHG
AV PEAK GRADIENT: 6 MMHG
AV VALVE AREA BY VELOCITY RATIO: 2.8 CM²
AV VALVE AREA: 2.7 CM²
AV VELOCITY RATIO: 1
BSA FOR ECHO PROCEDURE: 1.68 M2
CV ECHO LV RWT: 0.47 CM
DOP CALC AO PEAK VEL: 1.2 M/S
DOP CALC AO VTI: 25.5 CM
DOP CALC LVOT AREA: 2.8 CM2
DOP CALC LVOT DIAMETER: 1.9 CM
DOP CALC LVOT PEAK VEL: 1.2 M/S
DOP CALC LVOT STROKE VOLUME: 68.3 CM3
DOP CALC MV VTI: 24.3 CM
DOP CALCLVOT PEAK VEL VTI: 24.1 CM
E WAVE DECELERATION TIME: 259 MSEC
E/A RATIO: 0.63
E/E' RATIO: 11 M/S
ECHO LV POSTERIOR WALL: 1 CM (ref 0.6–1.1)
FRACTIONAL SHORTENING: 37.2 % (ref 28–44)
INTERVENTRICULAR SEPTUM: 0.9 CM (ref 0.6–1.1)
IVC DIAMETER: 1.54 CM
IVRT: 77 MSEC
LA MAJOR: 4.8 CM
LA MINOR: 5.2 CM
LEFT ATRIUM SIZE: 3.5 CM
LEFT INTERNAL DIMENSION IN SYSTOLE: 2.7 CM (ref 2.1–4)
LEFT VENTRICLE DIASTOLIC VOLUME INDEX: 50 ML/M2
LEFT VENTRICLE DIASTOLIC VOLUME: 84 ML
LEFT VENTRICLE END DIASTOLIC VOLUME APICAL 2 CHAMBER: 97.86 ML
LEFT VENTRICLE END DIASTOLIC VOLUME APICAL 4 CHAMBER: 82.47 ML
LEFT VENTRICLE MASS INDEX: 79 G/M2
LEFT VENTRICLE SYSTOLIC VOLUME INDEX: 16.7 ML/M2
LEFT VENTRICLE SYSTOLIC VOLUME: 28 ML
LEFT VENTRICULAR INTERNAL DIMENSION IN DIASTOLE: 4.3 CM (ref 3.5–6)
LEFT VENTRICULAR MASS: 132.7 G
LV LATERAL E/E' RATIO: 10 M/S
LV SEPTAL E/E' RATIO: 11.7 M/S
LVED V (TEICH): 83.92 ML
LVES V (TEICH): 28.14 ML
LVOT MG: 2.93 MMHG
LVOT MV: 0.81 CM/S
MV A" WAVE DURATION": 128.45 MSEC
MV MEAN GRADIENT: 2 MMHG
MV PEAK A VEL: 1.11 M/S
MV PEAK E VEL: 0.7 M/S
MV PEAK GRADIENT: 5 MMHG
MV STENOSIS PRESSURE HALF TIME: 58.36 MS
MV VALVE AREA BY CONTINUITY EQUATION: 2.81 CM2
MV VALVE AREA P 1/2 METHOD: 3.77 CM2
OHS CV RV/LV RATIO: 0.47 CM
OHS LV EJECTION FRACTION SIMPSONS BIPLANE MOD: 70 %
PISA MRMAX VEL: 5.23 M/S
PISA TR MAX VEL: 2.2 M/S
PV MV: 0.66 M/S
PV PEAK GRADIENT: 3 MMHG
PV PEAK VELOCITY: 0.9 M/S
RA MAJOR: 5.2 CM
RIGHT VENTRICLE DIASTOLIC BASEL DIMENSION: 2 CM
RIGHT VENTRICULAR END-DIASTOLIC DIMENSION: 2.01 CM
RV TISSUE DOPPLER FREE WALL SYSTOLIC VELOCITY 1 (APICAL 4 CHAMBER VIEW): 9.73 CM/S
TDI LATERAL: 0.07 M/S
TDI SEPTAL: 0.06 M/S
TDI: 0.07 M/S
TR MAX PG: 20 MMHG
TRICUSPID ANNULAR PLANE SYSTOLIC EXCURSION: 1.82 CM
Z-SCORE OF LEFT VENTRICULAR DIMENSION IN END DIASTOLE: -0.86
Z-SCORE OF LEFT VENTRICULAR DIMENSION IN END SYSTOLE: -0.58

## 2025-04-15 PROCEDURE — 93306 TTE W/DOPPLER COMPLETE: CPT | Mod: 26,,, | Performed by: GENERAL PRACTICE

## 2025-04-15 PROCEDURE — 99999 PR PBB SHADOW E&M-EST. PATIENT-LVL I: CPT | Mod: PBBFAC,,,

## 2025-04-15 PROCEDURE — 93306 TTE W/DOPPLER COMPLETE: CPT

## 2025-04-15 RX ORDER — AMLODIPINE BESYLATE 5 MG/1
5 TABLET ORAL DAILY
Qty: 30 TABLET | Refills: 3 | Status: SHIPPED | OUTPATIENT
Start: 2025-04-15 | End: 2026-04-15

## 2025-04-15 NOTE — TELEPHONE ENCOUNTER
Attempted to contact Mrs. Ray in regards to Dr. Landrum recommendations due to elevated blood pressure. Left message for patient to return call.       Dr. Landrum's recommendations/comments:  Have her increase amlodipine to 5mg. New rx sent.     2 weeks with Deedee for titration.

## 2025-04-15 NOTE — TELEPHONE ENCOUNTER
----- Message from Jose Manuel Landrum MD sent at 4/15/2025  3:54 PM CDT -----  Have her increase amlodipine to 5mg. New rx sent. 2 weeks with Deedee for titration.  ----- Message -----  From: Torie Ashford MA  Sent: 4/15/2025   3:00 PM CDT  To: Jose Manuel Landrum MD    Please see message from Angela Sandoval.  ----- Message -----  From: Angela Sandoval MA  Sent: 4/15/2025   2:49 PM CDT  To: Diallo Richard Staff    Patient came into office carlos for blood pressure check while at [facility] for Echo please let Dr. Landrum know at 2:48 pm 04/15/2025 her pressure was 169/81  pulse 74 % 97

## 2025-04-15 NOTE — TELEPHONE ENCOUNTER
Call placed to patient. No answer received. Left message requesting return call to office.   Voicemail included date and time voicemail was left for patient.        Esequiel Cesar Staff     ----- Message from Esequiel sent at 4/15/2025  4:01 PM CDT -----  Type:  Patient Returning Call  Who Called:pt  Who Left Message for Patient:ed  Does the patient know what this is regarding?:blood pressure  Would the patient rather a call back or a response via [facility]? Call back  Best Call Back Number:[phone] Additional Information: please call to discuss

## 2025-05-26 ENCOUNTER — OFFICE VISIT (OUTPATIENT)
Dept: FAMILY MEDICINE | Facility: CLINIC | Age: 85
End: 2025-05-26
Payer: MEDICARE

## 2025-05-26 VITALS
SYSTOLIC BLOOD PRESSURE: 148 MMHG | HEART RATE: 83 BPM | WEIGHT: 133.81 LBS | BODY MASS INDEX: 22.3 KG/M2 | DIASTOLIC BLOOD PRESSURE: 78 MMHG | HEIGHT: 65 IN | OXYGEN SATURATION: 98 % | TEMPERATURE: 98 F

## 2025-05-26 DIAGNOSIS — Z95.2 HISTORY OF TRANSCATHETER AORTIC VALVE REPLACEMENT (TAVR): ICD-10-CM

## 2025-05-26 DIAGNOSIS — Z00.00 ENCOUNTER FOR MEDICARE ANNUAL WELLNESS EXAM: Primary | ICD-10-CM

## 2025-05-26 DIAGNOSIS — I10 ESSENTIAL HYPERTENSION: ICD-10-CM

## 2025-05-26 PROCEDURE — 99999 PR PBB SHADOW E&M-EST. PATIENT-LVL IV: CPT | Mod: PBBFAC,,, | Performed by: NURSE PRACTITIONER

## 2025-05-26 NOTE — PROGRESS NOTES
"  Divine Ray presented for a  Medicare AWV and comprehensive Health Risk Assessment today. The following components were reviewed and updated:    Medical history  Family History  Social history  Allergies and Current Medications  Health Risk Assessment  Health Maintenance  Care Team         ** See Completed Assessments for Annual Wellness Visit within the encounter summary.**         The following assessments were completed:  Living Situation  CAGE  Depression Screening  Timed Get Up and Go  Whisper Test  Cognitive Function Screening  Nutrition Screening  ADL Screening  PAQ Screening    Clock in media   Opioid documentation:      Patient does not have a current opioid prescription.        Vitals:    05/26/25 1029   BP: (!) 148/78   Pulse: 83   Temp: 98.2 °F (36.8 °C)   TempSrc: Oral   SpO2: 98%   Weight: 60.7 kg (133 lb 13.1 oz)   Height: 5' 5" (1.651 m)     Body mass index is 22.27 kg/m².  Physical Exam  Constitutional:       Appearance: She is well-developed.   HENT:      Head: Normocephalic and atraumatic.      Nose: Nose normal.   Eyes:      General: Lids are normal.      Conjunctiva/sclera: Conjunctivae normal.   Cardiovascular:      Rate and Rhythm: Normal rate.   Pulmonary:      Effort: Pulmonary effort is normal.   Neurological:      Mental Status: She is alert and oriented to person, place, and time.   Psychiatric:         Speech: Speech normal.         Behavior: Behavior normal.               Diagnoses and health risks identified today and associated recommendations/orders:    1. Encounter for Medicare annual wellness exam  Discussed health maintenance guidelines appropriate for age.      - Referral to Enhanced Annual Wellness Visit (eAWV) W+1    2. Essential hypertension  Mildly elevated, patient reports that cardiology is monitoring this and expects this following her TAVR    3. History of transcatheter aortic valve replacement (TAVR)  Stable, continue current level of care and monitoring per " cardiology       Provided Divine with a 5-10 year written screening schedule and personal prevention plan. Recommendations were developed using the USPSTF age appropriate recommendations. Education, counseling, and referrals were provided as needed. After Visit Summary printed and given to patient which includes a list of additional screenings\tests needed.    Follow up for One year for Annual Wellness Visit.    Rozina Morales, NP      I offered to discuss advanced care planning, including how to pick a person who would make decisions for you if you were unable to make them for yourself, called a health care power of , and what kind of decisions you might make such as use of life sustaining treatments such as ventilators and tube feeding when faced with a life limiting illness recorded on a living will that they will need to know. (How you want to be cared for as you near the end of your natural life)     X Patient is interested in learning more about how to make advanced directives.  I provided them paperwork and offered to discuss this with them.

## 2025-05-26 NOTE — PATIENT INSTRUCTIONS
Counseling and Referral of Other Preventative  (Italic type indicates deductible and co-insurance are waived)    Patient Name: Divine Ray  Today's Date: 5/26/2025    Health Maintenance       Date Due Completion Date    TETANUS VACCINE Never done ---    Shingles Vaccine (2 of 3) 06/19/2012 4/24/2012    RSV Vaccine (Age 60+ and Pregnant patients) (1 - 1-dose 75+ series) Never done ---    COVID-19 Vaccine (4 - 2024-25 season) 09/01/2024 9/27/2021    DEXA Scan 05/30/2027 5/30/2024    Lipid Panel 03/26/2030 3/26/2025    Override on 9/2/2020: Done        No orders of the defined types were placed in this encounter.    The following information is provided to all patients.  This information is to help you find resources for any of the problems found today that may be affecting your health:                  Living healthy guide: www.[facility].[place].[facility]      Understanding Diabetes: www.diabetes.org      Eating healthy: www.cdc.gov/healthyweight      CDC home safety checklist: www.cdc.gov/steadi/patient.html      Agency on Aging: www.goea.[place].[facility]      Alcoholics anonymous (AA): www.aa.org      Physical Activity: www.priyanka.nih.gov/gu6taxw      Tobacco use: www.quitwithusla.org

## 2025-05-27 ENCOUNTER — TELEPHONE (OUTPATIENT)
Dept: FAMILY MEDICINE | Facility: CLINIC | Age: 85
End: 2025-05-27
Payer: MEDICARE

## 2025-05-27 NOTE — TELEPHONE ENCOUNTER
Cecilia Hughes Staff     ----- Message from Cecilia sent at 5/27/2025  9:07 AM CDT -----  Contact: Pt  Type:  Needs Medical Advice  Who Called: Pt    Would the patient rather a call back or a response via [facility]? Call  Best Call Back Number: [phone]     Additional Information: Pt Did a [facility] evaluation and did not receive the $50 gift card Pt asks can she come back and receive it please contact for further information thank you.

## 2025-05-27 NOTE — TELEPHONE ENCOUNTER
Patient states she was advised she would receive a gift card for completing AWV.  Patient states she performed AWV on yesterday but nothing was mentioned regarding the gift card.  Writer advised will forward message to the staff of the provider who completed AWV as writer is unfamiliar with this matter.  Please follow up with patient at your earliest convenience.  Thank you.

## 2025-05-30 ENCOUNTER — TELEPHONE (OUTPATIENT)
Dept: FAMILY MEDICINE | Facility: CLINIC | Age: 85
End: 2025-05-30
Payer: MEDICARE

## 2025-05-30 NOTE — TELEPHONE ENCOUNTER
I spoke with pt via phone I advised her that Dr. Landrum would like for her to come in for an appt. She states that she will have to call back in to book. No further questions at this time.

## 2025-05-30 NOTE — TELEPHONE ENCOUNTER
"I spoke with pt via phone. Pt states that she is experiencing burning and pressure only at night. I advised pt that an appt would be needed being that we are going into the weekend. Pt states that she can deal with the pain and prefers Dr. Landrum to send in a " mild " abx. I advised her that I will send a message to Dr. Landrum for recommendations.             Copied from CRM #[id number]. Topic: General Inquiry - Patient Advice  >> May 30, 2025  9:20 AM Cynthia wrote:  Type:  Needs Medical Advice    Who Called: self  Symptoms (please be specific): prt is requesting UTI medication being sent to [facility], pt is experiencing burning and pressure only at night    Pharmacy [facility] and phone #:    [id number] [address]  [address] [address]  Phone: [phone] Fax: [phone]      Would the patient rather a call back or a response via [facility]? call  Best Call Back Number:    Additional Information: please advise and thank you.  "

## 2025-06-02 ENCOUNTER — TELEPHONE (OUTPATIENT)
Dept: FAMILY MEDICINE | Facility: CLINIC | Age: 85
End: 2025-06-02
Payer: MEDICARE

## 2025-06-02 ENCOUNTER — LAB VISIT (OUTPATIENT)
Dept: LAB | Facility: HOSPITAL | Age: 85
End: 2025-06-02
Attending: PHYSICIAN ASSISTANT
Payer: MEDICARE

## 2025-06-02 ENCOUNTER — OFFICE VISIT (OUTPATIENT)
Dept: FAMILY MEDICINE | Facility: CLINIC | Age: 85
End: 2025-06-02
Payer: MEDICARE

## 2025-06-02 VITALS
BODY MASS INDEX: 22.37 KG/M2 | HEART RATE: 81 BPM | SYSTOLIC BLOOD PRESSURE: 122 MMHG | OXYGEN SATURATION: 97 % | WEIGHT: 134.25 LBS | HEIGHT: 65 IN | DIASTOLIC BLOOD PRESSURE: 70 MMHG

## 2025-06-02 DIAGNOSIS — R35.1 NOCTURIA: ICD-10-CM

## 2025-06-02 DIAGNOSIS — I10 ESSENTIAL HYPERTENSION: ICD-10-CM

## 2025-06-02 DIAGNOSIS — R30.0 DYSURIA: ICD-10-CM

## 2025-06-02 DIAGNOSIS — S80.812A ABRASION OF LEFT LOWER EXTREMITY, INITIAL ENCOUNTER: ICD-10-CM

## 2025-06-02 DIAGNOSIS — R30.0 DYSURIA: Primary | ICD-10-CM

## 2025-06-02 PROCEDURE — 3078F DIAST BP <80 MM HG: CPT | Mod: CPTII,S$GLB,, | Performed by: PHYSICIAN ASSISTANT

## 2025-06-02 PROCEDURE — 3288F FALL RISK ASSESSMENT DOCD: CPT | Mod: CPTII,S$GLB,, | Performed by: PHYSICIAN ASSISTANT

## 2025-06-02 PROCEDURE — 1126F AMNT PAIN NOTED NONE PRSNT: CPT | Mod: CPTII,S$GLB,, | Performed by: PHYSICIAN ASSISTANT

## 2025-06-02 PROCEDURE — 1160F RVW MEDS BY RX/DR IN RCRD: CPT | Mod: CPTII,S$GLB,, | Performed by: PHYSICIAN ASSISTANT

## 2025-06-02 PROCEDURE — 87088 URINE BACTERIA CULTURE: CPT

## 2025-06-02 PROCEDURE — 99214 OFFICE O/P EST MOD 30 MIN: CPT | Mod: S$GLB,,, | Performed by: PHYSICIAN ASSISTANT

## 2025-06-02 PROCEDURE — G2211 COMPLEX E/M VISIT ADD ON: HCPCS | Mod: S$GLB,,, | Performed by: PHYSICIAN ASSISTANT

## 2025-06-02 PROCEDURE — 1101F PT FALLS ASSESS-DOCD LE1/YR: CPT | Mod: CPTII,S$GLB,, | Performed by: PHYSICIAN ASSISTANT

## 2025-06-02 PROCEDURE — 3074F SYST BP LT 130 MM HG: CPT | Mod: CPTII,S$GLB,, | Performed by: PHYSICIAN ASSISTANT

## 2025-06-02 PROCEDURE — 81003 URINALYSIS AUTO W/O SCOPE: CPT

## 2025-06-02 PROCEDURE — 99999 PR PBB SHADOW E&M-EST. PATIENT-LVL III: CPT | Mod: PBBFAC,,, | Performed by: PHYSICIAN ASSISTANT

## 2025-06-02 PROCEDURE — 1159F MED LIST DOCD IN RCRD: CPT | Mod: CPTII,S$GLB,, | Performed by: PHYSICIAN ASSISTANT

## 2025-06-02 RX ORDER — PHENAZOPYRIDINE HYDROCHLORIDE 200 MG/1
200 TABLET, FILM COATED ORAL 3 TIMES DAILY PRN
Qty: 10 TABLET | Refills: 0 | Status: SHIPPED | OUTPATIENT
Start: 2025-06-02

## 2025-06-02 RX ORDER — PHENAZOPYRIDINE HYDROCHLORIDE 200 MG/1
200 TABLET, FILM COATED ORAL 3 TIMES DAILY PRN
Qty: 10 TABLET | Refills: 0 | Status: CANCELLED | OUTPATIENT
Start: 2025-06-02

## 2025-06-02 RX ORDER — NITROFURANTOIN 25; 75 MG/1; MG/1
100 CAPSULE ORAL 2 TIMES DAILY
Qty: 10 CAPSULE | Refills: 0 | Status: SHIPPED | OUTPATIENT
Start: 2025-06-02 | End: 2025-06-07

## 2025-06-03 LAB
BACTERIA #/AREA URNS AUTO: ABNORMAL /HPF
BILIRUB UR QL STRIP.AUTO: NEGATIVE
CLARITY UR: ABNORMAL
COLOR UR AUTO: YELLOW
GLUCOSE UR QL STRIP: NEGATIVE
HGB UR QL STRIP: NEGATIVE
HYALINE CASTS UR QL AUTO: 3 /LPF (ref 0–1)
KETONES UR QL STRIP: NEGATIVE
LEUKOCYTE ESTERASE UR QL STRIP: ABNORMAL
MICROSCOPIC COMMENT: ABNORMAL
NITRITE UR QL STRIP: NEGATIVE
PH UR STRIP: 6 [PH]
PROT UR QL STRIP: ABNORMAL
RBC #/AREA URNS AUTO: 2 /HPF (ref 0–4)
SP GR UR STRIP: 1.02
UROBILINOGEN UR STRIP-ACNC: NEGATIVE EU/DL
WBC #/AREA URNS AUTO: >100 /HPF (ref 0–5)

## 2025-06-04 LAB — BACTERIA UR CULT: ABNORMAL

## 2025-06-05 ENCOUNTER — RESULTS FOLLOW-UP (OUTPATIENT)
Dept: FAMILY MEDICINE | Facility: CLINIC | Age: 85
End: 2025-06-05

## (undated) DEVICE — HEMOSTAT VASC BAND REG 24CM

## (undated) DEVICE — OMNIPAQUE 350MG 150ML VIAL

## (undated) DEVICE — CATH DXTERITY JL35 100CM 5FR

## (undated) DEVICE — CATH DXTERITY JR40 100CM 5FR

## (undated) DEVICE — KIT GLIDESHEATH SLEND 6FR 10CM

## (undated) DEVICE — GUIDEWIRE INQWIRE SE 3MM JTIP

## (undated) DEVICE — CATH DXTERITY JL40 100CM 5FR